# Patient Record
Sex: MALE
[De-identification: names, ages, dates, MRNs, and addresses within clinical notes are randomized per-mention and may not be internally consistent; named-entity substitution may affect disease eponyms.]

---

## 2020-05-09 ENCOUNTER — HOSPITAL ENCOUNTER (EMERGENCY)
Dept: HOSPITAL 5 - ED | Age: 65
LOS: 1 days | Discharge: HOME | End: 2020-05-10
Payer: SELF-PAY

## 2020-05-09 DIAGNOSIS — Z48.02: Primary | ICD-10-CM

## 2020-05-09 DIAGNOSIS — Z53.21: ICD-10-CM

## 2020-05-10 NOTE — EVENT NOTE
ED Screening Note


Date of service: 05/10/20


Time: 01:06


ED Screening Note: 


Patient is a 64-year-old gentleman, who presents to the ER with a complaint of 

left scalp staple retention x4 months.  He denies additional injuries and denies

additional complaints.  The staple was removed prior to my personal evaluation 

of the patient.


The patient denies additional injuries and complaints at this time.





Patient in no distress, and clinically sober


Vital signs as documented.  Tachycardia resolved on my exam





Head exam is unremarkable. 


No obvious foreign bodies noted.


No scleral icterus or corneal arcus noted.


 Neck is without jugular venous distension, thyromegaly





 Lungs are clear to auscultation and percussion.


 Cardiac exam reveals 


Rhythm is regular. First and second heart sounds normal. No murmurs, rubs or 

gallops.


No abdominal tenderness, rebound or guarding.


 Extremities are nonedematous 


2+ pulses noted in the bilateral upper extremities.





Alert and oriented x3, and clinically sober.





Moves 4 extremities, GCS of 15.





Does not appear to have an emergent medical condition at this time.  He can 

follow-up expectantly with outpatient primary care doctor.














                                   Vital Signs











  05/10/20





  00:02


 


Temperature 98.1 F


 


Pulse Rate 116 H


 


Respiratory 18





Rate 


 


Blood Pressure 139/86


 


O2 Sat by Pulse 96





Oximetry 








Tachycardia is resolved on my exam

## 2021-02-15 ENCOUNTER — HOSPITAL ENCOUNTER (INPATIENT)
Dept: HOSPITAL 5 - ED | Age: 66
LOS: 22 days | Discharge: HOME HEALTH | DRG: 871 | End: 2021-03-09
Attending: INTERNAL MEDICINE | Admitting: INTERNAL MEDICINE
Payer: COMMERCIAL

## 2021-02-15 DIAGNOSIS — I82.403: ICD-10-CM

## 2021-02-15 DIAGNOSIS — J12.82: ICD-10-CM

## 2021-02-15 DIAGNOSIS — Z79.899: ICD-10-CM

## 2021-02-15 DIAGNOSIS — A41.89: Primary | ICD-10-CM

## 2021-02-15 DIAGNOSIS — U07.1: ICD-10-CM

## 2021-02-15 DIAGNOSIS — F17.210: ICD-10-CM

## 2021-02-15 DIAGNOSIS — J96.01: ICD-10-CM

## 2021-02-15 LAB
APTT BLD: 26.5 SEC. (ref 24.2–36.6)
BASOPHILS # (AUTO): 0.1 K/MM3 (ref 0–0.1)
BASOPHILS NFR BLD AUTO: 1.1 % (ref 0–1.8)
EOSINOPHIL # BLD AUTO: 0 K/MM3 (ref 0–0.4)
EOSINOPHIL NFR BLD AUTO: 0.1 % (ref 0–4.3)
HCT VFR BLD CALC: 43.8 % (ref 35.5–45.6)
HGB BLD-MCNC: 14.7 GM/DL (ref 11.8–15.2)
INR PPP: 1.13 (ref 0.87–1.13)
LYMPHOCYTES # BLD AUTO: 0.3 K/MM3 (ref 1.2–5.4)
LYMPHOCYTES NFR BLD AUTO: 4.1 % (ref 13.4–35)
MCHC RBC AUTO-ENTMCNC: 34 % (ref 32–34)
MCV RBC AUTO: 94 FL (ref 84–94)
MONOCYTES # (AUTO): 0.6 K/MM3 (ref 0–0.8)
MONOCYTES % (AUTO): 7.8 % (ref 0–7.3)
PLATELET # BLD: 216 K/MM3 (ref 140–440)
RBC # BLD AUTO: 4.64 M/MM3 (ref 3.65–5.03)

## 2021-02-15 PROCEDURE — 87040 BLOOD CULTURE FOR BACTERIA: CPT

## 2021-02-15 PROCEDURE — 84145 PROCALCITONIN (PCT): CPT

## 2021-02-15 PROCEDURE — 87641 MR-STAPH DNA AMP PROBE: CPT

## 2021-02-15 PROCEDURE — 93970 EXTREMITY STUDY: CPT

## 2021-02-15 PROCEDURE — 96375 TX/PRO/DX INJ NEW DRUG ADDON: CPT

## 2021-02-15 PROCEDURE — 83615 LACTATE (LD) (LDH) ENZYME: CPT

## 2021-02-15 PROCEDURE — 94760 N-INVAS EAR/PLS OXIMETRY 1: CPT

## 2021-02-15 PROCEDURE — 85730 THROMBOPLASTIN TIME PARTIAL: CPT

## 2021-02-15 PROCEDURE — 84443 ASSAY THYROID STIM HORMONE: CPT

## 2021-02-15 PROCEDURE — 96365 THER/PROPH/DIAG IV INF INIT: CPT

## 2021-02-15 PROCEDURE — 85025 COMPLETE CBC W/AUTO DIFF WBC: CPT

## 2021-02-15 PROCEDURE — 82728 ASSAY OF FERRITIN: CPT

## 2021-02-15 PROCEDURE — 96372 THER/PROPH/DIAG INJ SC/IM: CPT

## 2021-02-15 PROCEDURE — 84484 ASSAY OF TROPONIN QUANT: CPT

## 2021-02-15 PROCEDURE — U0003 INFECTIOUS AGENT DETECTION BY NUCLEIC ACID (DNA OR RNA); SEVERE ACUTE RESPIRATORY SYNDROME CORONAVIRUS 2 (SARS-COV-2) (CORONAVIRUS DISEASE [COVID-19]), AMPLIFIED PROBE TECHNIQUE, MAKING USE OF HIGH THROUGHPUT TECHNOLOGIES AS DESCRIBED BY CMS-2020-01-R: HCPCS

## 2021-02-15 PROCEDURE — 82140 ASSAY OF AMMONIA: CPT

## 2021-02-15 PROCEDURE — 84439 ASSAY OF FREE THYROXINE: CPT

## 2021-02-15 PROCEDURE — 85610 PROTHROMBIN TIME: CPT

## 2021-02-15 PROCEDURE — 85379 FIBRIN DEGRADATION QUANT: CPT

## 2021-02-15 PROCEDURE — 80053 COMPREHEN METABOLIC PANEL: CPT

## 2021-02-15 PROCEDURE — 71045 X-RAY EXAM CHEST 1 VIEW: CPT

## 2021-02-15 PROCEDURE — 36415 COLL VENOUS BLD VENIPUNCTURE: CPT

## 2021-02-15 PROCEDURE — 93005 ELECTROCARDIOGRAM TRACING: CPT

## 2021-02-15 PROCEDURE — 80048 BASIC METABOLIC PNL TOTAL CA: CPT

## 2021-02-15 PROCEDURE — 86140 C-REACTIVE PROTEIN: CPT

## 2021-02-15 NOTE — XRAY REPORT
CHEST 1 VIEW 2/15/2021 10:00 PM



INDICATION / CLINICAL INFORMATION:

SOB, hypoxia.



COMPARISON: 

None available.



FINDINGS:



SUPPORT DEVICES: None.



HEART / MEDIASTINUM: No significant abnormality. 



LUNGS / PLEURA: Moderate bilateral airspace disease. Elevated left hemidiaphragm. No pneumothorax. 



ADDITIONAL FINDINGS: Old healed fracture deformity left scapula body.



IMPRESSION:

1. Bilateral pneumonia



Signer Name: Preet Pulido MD 

Signed: 2/15/2021 11:14 PM

Workstation Name: [id number]

## 2021-02-15 NOTE — EMERGENCY DEPARTMENT REPORT
ED Shortness of Breath HPI





- General


Stated Complaint: DIFF BREATHING POSS COVID


Time Seen by Provider: 02/15/21 22:27





- History of Present Illness


Initial Comments: 





65-year-old male, no past medical history, presents to ED with 2-day history of 

shortness of breath, decreased appetite, and fatigue.  Patient reports mild 

cough and loss of smell and taste.  He denies any vomiting or diarrhea.  Patient

is currently an inmate with [facility].  Patient brought to ED via EMS.  

Patient hypoxic in route.


MD Complaint: shortness of breath


-: days(s) (2)


Severity: moderate


Consistency: constant


Improves With: nothing


Worsens With: exertion


Context: recent URI


Treatments Prior to Arrival: oxygen





- Related Data


Home Oxygen Therapy: No


                                Home Medications











 Medication  Instructions  Recorded  Confirmed  Last Taken


 


No Known Home Medications [No  02/16/21 02/16/21 Unknown





Reported Home Medications]    











                                    Allergies











Allergy/AdvReac Type Severity Reaction Status Date / Time


 


No Known Allergies Allergy   Verified 02/16/21 01:03














ED Review of Systems


ROS: 


Stated complaint: DIFF BREATHING POSS COVID


Other details as noted in HPI





Comment: All other systems reviewed and negative


Constitutional: malaise.  denies: chills, fever


Respiratory: cough, shortness of breath


Gastrointestinal: denies: vomiting, diarrhea





ED Past Medical Hx





- Social History


Smoking Status: Current Every Day Smoker





- Medications


Home Medications: 


                                Home Medications











 Medication  Instructions  Recorded  Confirmed  Last Taken  Type


 


No Known Home Medications [No  02/16/21 02/16/21 Unknown History





Reported Home Medications]     














ED Physical Exam





- General


General appearance: alert, in no apparent distress





- Head


Head exam: Present: atraumatic, normocephalic





- Eye


Eye exam: Present: normal appearance, EOMI





- ENT


ENT exam: Present: mucous membranes moist





- Neck


Neck exam: Present: normal inspection





- Respiratory


Respiratory exam: Present: normal lung sounds bilaterally.  Absent: respiratory 

distress





- Cardiovascular


Cardiovascular Exam: Present: regular rate, normal rhythm





- GI/Abdominal


GI/Abdominal exam: Present: soft.  Absent: distended, tenderness





- Extremities Exam


Extremities exam: Present: normal inspection





- Neurological Exam


Neurological exam: Present: alert, oriented X3





- Psychiatric


Psychiatric exam: Present: normal affect, normal mood





- Skin


Skin exam: Present: warm, dry, intact, normal color





ED Course


                                   Vital Signs











  02/15/21 02/15/21 02/15/21





  22:26 22:28 22:31


 


Temperature  97.7 F 


 


Pulse Rate 95 H 97 H 96 H


 


Respiratory 27 H 30 H 26 H





Rate   


 


Blood Pressure   


 


Blood Pressure  109/66 





[Left]   


 


O2 Sat by Pulse 87 84 89





Oximetry   














  02/15/21 02/15/21 02/15/21





  22:45 23:01 23:15


 


Temperature   


 


Pulse Rate 88 104 H 93 H


 


Respiratory 28 H 29 H 24





Rate   


 


Blood Pressure 118/69 112/67 120/67


 


Blood Pressure   





[Left]   


 


O2 Sat by Pulse 96 78 L 97





Oximetry   














  02/15/21 02/15/21 02/16/21





  23:31 23:37 00:00


 


Temperature   


 


Pulse Rate 86 85 87


 


Respiratory 30 H 22 22





Rate   


 


Blood Pressure 110/70 110/70 103/66


 


Blood Pressure   





[Left]   


 


O2 Sat by Pulse 95 84 87





Oximetry   














  02/16/21 02/16/21 02/16/21





  00:08 00:31 00:51


 


Temperature   


 


Pulse Rate  91 H 88


 


Respiratory  23 37 H





Rate   


 


Blood Pressure  99/61 104/66


 


Blood Pressure   





[Left]   


 


O2 Sat by Pulse 97 95 89





Oximetry   














  02/16/21 02/16/21 02/16/21





  01:01 01:11 01:21


 


Temperature   


 


Pulse Rate 88 84 82


 


Respiratory 28 H 19 35 H





Rate   


 


Blood Pressure 103/61 103/61 103/61


 


Blood Pressure   





[Left]   


 


O2 Sat by Pulse 94 94 95





Oximetry   














  02/16/21





  01:30


 


Temperature 


 


Pulse Rate 91 H


 


Respiratory 25 H





Rate 


 


Blood Pressure 99/61


 


Blood Pressure 





[Left] 


 


O2 Sat by Pulse 94





Oximetry 














ED Medical Decision Making





- Lab Data


Result diagrams: 


                                 02/15/21 23:28





                                 02/15/21 23:28





- EKG Data


-: EKG Interpreted by Me


EKG shows normal: sinus rhythm, axis, intervals, QRS complexes, ST-T waves


Rate: normal





- EKG Data


Interpretation: no acute changes





- Radiology Data


Radiology results: report reviewed, image reviewed





- Medical Decision Making





65-year-old male presents to ED with shortness of breath.  Patient hypoxic with 

O2 sats in the 70s on room air.  Patient placed on high flow nasal cannula.  

Chest x-ray shows bilateral pneumonia.  Blood cultures drawn.  Rocephin, 

azithromycin, Decadron given.  Patient will be admitted to hospitalist, Dr. Carlson, for further management.





- Differential Diagnosis


COVID-19, pneumonia


Critical care attestation.: 


If time is entered above; I have spent that time in minutes in the direct care 

of this critically ill patient, excluding procedure time.








ED Disposition


Clinical Impression: 


 Suspected 2019 novel coronavirus infection, Acute respiratory failure with 

hypoxia, Pneumonia, Sepsis





Disposition: DC-09 OP ADMIT IP TO THIS HOSP


Is pt being admited?: Yes


Condition: Critical


Time of Disposition: 00:22

## 2021-02-16 LAB
ALBUMIN SERPL-MCNC: 3 G/DL (ref 3.9–5)
ALT SERPL-CCNC: 12 UNITS/L (ref 7–56)
BUN SERPL-MCNC: 19 MG/DL (ref 9–20)
BUN/CREAT SERPL: 27 %
CALCIUM SERPL-MCNC: 8.6 MG/DL (ref 8.4–10.2)
CRP SERPL-MCNC: 17.3 MG/DL (ref 0–1.3)
HEMOLYSIS INDEX: 1

## 2021-02-16 RX ADMIN — Medication SCH ML: at 21:42

## 2021-02-16 RX ADMIN — Medication SCH ML: at 09:19

## 2021-02-16 RX ADMIN — AZITHROMYCIN SCH MG: 250 TABLET, FILM COATED ORAL at 21:41

## 2021-02-16 RX ADMIN — CEFTRIAXONE SODIUM SCH MLS/HR: 2 INJECTION, POWDER, FOR SOLUTION INTRAMUSCULAR; INTRAVENOUS at 21:42

## 2021-02-16 RX ADMIN — ENOXAPARIN SODIUM SCH MG: 100 INJECTION SUBCUTANEOUS at 12:14

## 2021-02-16 RX ADMIN — ENOXAPARIN SODIUM SCH MG: 100 INJECTION SUBCUTANEOUS at 21:41

## 2021-02-16 NOTE — CONSULTATION
History of Present Illness


Consult date: 02/16/21


Requesting physician: SHANTANU EWING


Reason for consult: hypoxemia


History of present illness: 





66 y/o male prisoner presents with shortness of breath and hypoxemia.  CUrrently

on HFNC with concern for COVID.





Past History


Past Medical History: No medical history


Past Surgical History: Other (Multiple Fractures)


Social history: smoking (Current daily smoker)


Family history: no significant family history





Medications and Allergies


                                    Allergies











Allergy/AdvReac Type Severity Reaction Status Date / Time


 


No Known Allergies Allergy   Verified 02/16/21 01:03











                                Home Medications











 Medication  Instructions  Recorded  Confirmed  Last Taken  Type


 


No Known Home Medications [No  02/16/21 02/16/21 Unknown History





Reported Home Medications]     











Active Meds: 


Active Medications





Acetaminophen (Acetaminophen 325 Mg Tab)  650 mg PO Q4H PRN


   PRN Reason: Pain MILD(1-3)/Fever >100.5/HA


Azithromycin (Azithromycin 250 Mg Tab)  500 mg PO QHS YRN


   Stop: 02/19/21 22:01


Dexamethasone (Dexamethasone 4 Mg/Ml Vial)  6 mg IV Q24HR YRN


   Stop: 02/25/21 10:01


   Last Admin: 02/16/21 09:19 Dose:  6 mg


   Documented by: 


Enoxaparin Sodium (Enoxaparin 80 Mg/0.8 Ml Inj)  70 mg SUB-Q Q12HR YRN


Ceftriaxone Sodium (Rocephin/Ns 2 Gm/100 Ml)  2 gm in 100 mls @ 200 mls/hr IV 

Q24H YRN; Protocol


Magnesium Hydroxide (Magnesium Hydroxide (Mom) Oral Liqd Udc)  30 ml PO Q4H PRN


   PRN Reason: Constipation


Morphine Sulfate (Morphine 2 Mg/1 Ml Inj)  2 mg IV Q4H PRN


   PRN Reason: Pain, Moderate (4-6)


Ondansetron HCl (Ondansetron 4 Mg/2 Ml Inj)  4 mg IV Q8H PRN


   PRN Reason: Nausea And Vomiting


Sodium Chloride (Sodium Chloride 0.9% 10 Ml Flush Syringe)  10 ml IV BID YRN


   Last Admin: 02/16/21 09:19 Dose:  10 ml


   Documented by: 


Sodium Chloride (Sodium Chloride 0.9% 10 Ml Flush Syringe)  10 ml IV PRN PRN


   PRN Reason: LINE FLUSH











Physical Examination


Vital signs: 


                                   Vital Signs











Pulse Resp Pulse Ox


 


 95 H  27 H  87 


 


 02/15/21 22:26  02/15/21 22:26  02/15/21 22:26








Deferred to conserve PPE





Results





- Laboratory Findings


CBC and BMP: 


                                 02/17/21 04:14





                                 02/17/21 04:14


PT/INR, D-dimer











PT  14.3 Sec. (12.2-14.9)   02/15/21  23:28    


 


INR  1.13  (0.87-1.13)   02/15/21  23:28    


 


D-Dimer  6514.44 ng/mlDDU (0-234)  H  02/15/21  23:28    








Abnormal lab findings: 


                                  Abnormal Labs











  02/15/21 02/15/21 02/15/21





  23:28 23:28 23:28


 


Lymph % (Auto)  4.1 L  


 


Mono % (Auto)  7.8 H  


 


Lymph # (Auto)  0.3 L  


 


Seg Neutrophils %  86.9 H  


 


D-Dimer   6514.44 H 


 


Sodium    135 L


 


Chloride    95.2 L


 


Creatinine    0.7 L


 


Ferritin   


 


Lactate Dehydrogenase   


 


C-Reactive Protein   


 


Albumin    3.0 L














  02/15/21 02/15/21





  23:28 23:28


 


Lymph % (Auto)  


 


Mono % (Auto)  


 


Lymph # (Auto)  


 


Seg Neutrophils %  


 


D-Dimer  


 


Sodium  


 


Chloride  


 


Creatinine  


 


Ferritin  737.7 H 


 


Lactate Dehydrogenase   636 H


 


C-Reactive Protein   17.30 H


 


Albumin  














- Diagnostic Findings


Chest x-ray: image reviewed





Assessment and Plan





Agree with COVID precautions


Follow COVID testing


Agree with steroids


Prone as tolerated during the day and sleep prone at night.


Guarded prognosis.

## 2021-02-16 NOTE — EVENT NOTE
Date: 02/16/21


Patient seen and examined. Continue current management. Will check CTA CHEST, 

DOPPLER LOWER EXT. Start on Full ANTICOAGULATION

## 2021-02-16 NOTE — VASCULAR LAB REPORT
DUPLEX DOPPLER LOWER EXTREMITY VEINS, BILATERAL



INDICATION / CLINICAL INFORMATION:

shortness of breath.



TECHNIQUE:

Duplex doppler imaging was performed through the veins of both lower extremities using venous mar
dorothy and other maneuvers.



COMPARISON: 

None available.



FINDINGS:

RIGHT COMMON FEMORAL VEIN: Negative.

RIGHT FEMORAL VEIN: Acute thrombus distally.

RIGHT POPLITEAL VEIN: Acute thrombus.

RIGHT CALF VEINS: Acute thrombus.



LEFT COMMON FEMORAL VEIN: Negative.

LEFT FEMORAL VEIN: Acute thrombus distally.

LEFT POPLITEAL VEIN: Acute thrombus.

LEFT CALF VEINS: Acute thrombus.



ADDITIONAL FINDINGS: None.



IMPRESSION:

1. Bilateral acute DVTs as above.



The sonographer informed the patient's nurse, Marylu, of these findings at the conclusion of the exam
.



Signer Name: Prashant Ceja MD 

Signed: 2/16/2021 6:17 PM

Workstation Name: [id number]

## 2021-02-16 NOTE — CONSULTATION
History of Present Illness





- Reason for Consult


Consult date: 02/16/21





- History of Present Illness





52-year-old male no past medical history presented from [facility] with

complaints of cough, shortness of breath.  There are no other symptoms.  He is 

found to be hypoxic on presentation on room air at 70%.  He was admitted as a 

Covid PUI.





Afebrile with a white count of 7.8.  Covid testing pending.  Normal renal 

function with pending procalcitonin.  No growth on blood culture so far.  He is 

currently on ceftriaxone and azithromycin.  He is currently on high flow nasal 

cannula.





Imaging personally reviewed:


Chest x-ray: Bilateral pneumonia.





Review of systems:


Deferred due to PPE conservation status





Past History


Past Medical History: No medical history


Past Surgical History: Other (Multiple Fractures)


Social history: smoking (Current daily smoker)


Family history: no significant family history





Medications and Allergies


                                    Allergies











Allergy/AdvReac Type Severity Reaction Status Date / Time


 


No Known Allergies Allergy   Verified 02/16/21 01:03











                                Home Medications











 Medication  Instructions  Recorded  Confirmed  Last Taken  Type


 


No Known Home Medications [No  02/16/21 02/16/21 Unknown History





Reported Home Medications]     











Active Meds: 


Active Medications





Acetaminophen (Acetaminophen 325 Mg Tab)  650 mg PO Q4H PRN


   PRN Reason: Pain MILD(1-3)/Fever >100.5/HA


Azithromycin (Azithromycin 250 Mg Tab)  500 mg PO QHS YRN


   Stop: 02/19/21 22:01


Dexamethasone (Dexamethasone 4 Mg/Ml Vial)  6 mg IV Q24HR YRN


   Stop: 02/25/21 10:01


   Last Admin: 02/16/21 09:19 Dose:  6 mg


   Documented by: 


Enoxaparin Sodium (Enoxaparin 80 Mg/0.8 Ml Inj)  70 mg SUB-Q Q12HR YRN


Ceftriaxone Sodium (Rocephin/Ns 2 Gm/100 Ml)  2 gm in 100 mls @ 200 mls/hr IV 

Q24H YRN; Protocol


Magnesium Hydroxide (Magnesium Hydroxide (Mom) Oral Liqd Udc)  30 ml PO Q4H PRN


   PRN Reason: Constipation


Morphine Sulfate (Morphine 2 Mg/1 Ml Inj)  2 mg IV Q4H PRN


   PRN Reason: Pain, Moderate (4-6)


Ondansetron HCl (Ondansetron 4 Mg/2 Ml Inj)  4 mg IV Q8H PRN


   PRN Reason: Nausea And Vomiting


Sodium Chloride (Sodium Chloride 0.9% 10 Ml Flush Syringe)  10 ml IV BID YRN


   Last Admin: 02/16/21 09:19 Dose:  10 ml


   Documented by: 


Sodium Chloride (Sodium Chloride 0.9% 10 Ml Flush Syringe)  10 ml IV PRN PRN


   PRN Reason: LINE FLUSH











Physical Examination





- Physical Exam


Narrative exam: 





Physical exam deferred due to PPE conservation strategy.  Please refer to 

primary team's note.





- Constitutional


Vitals: 


                                   Vital Signs











Temp Pulse Resp BP Pulse Ox


 


 97.8 F   86   22   103/64   92 


 


 02/16/21 01:56  02/16/21 02:45  02/16/21 02:45  02/16/21 01:56  02/16/21 10:19








                           Temperature -Last 24 Hours











Temperature                    97.8 F


 


Temperature                    97.7 F


 


Temperature                    97.7 F

















Results





- Labs


CBC & Chem 7: 


                                 02/15/21 23:28





                                 02/15/21 23:28


Labs: 


                              Abnormal lab results











  02/15/21 02/15/21 02/15/21 Range/Units





  23:28 23:28 23:28 


 


Lymph % (Auto)  4.1 L    (13.4-35.0)  %


 


Mono % (Auto)  7.8 H    (0.0-7.3)  %


 


Lymph # (Auto)  0.3 L    (1.2-5.4)  K/mm3


 


Seg Neutrophils %  86.9 H    (40.0-70.0)  %


 


D-Dimer   6514.44 H   (0-234)  ng/mlDDU


 


Sodium    135 L  (137-145)  mmol/L


 


Chloride    95.2 L  ()  mmol/L


 


Creatinine    0.7 L  (0.8-1.3)  mg/dL


 


Ferritin     (30.0-300.0)  ng/mL


 


Lactate Dehydrogenase     ()  units/L


 


C-Reactive Protein     (0.00-1.30)  mg/dL


 


Albumin    3.0 L  (3.9-5)  g/dL














  02/15/21 02/15/21 Range/Units





  23:28 23:28 


 


Lymph % (Auto)    (13.4-35.0)  %


 


Mono % (Auto)    (0.0-7.3)  %


 


Lymph # (Auto)    (1.2-5.4)  K/mm3


 


Seg Neutrophils %    (40.0-70.0)  %


 


D-Dimer    (0-234)  ng/mlDDU


 


Sodium    (137-145)  mmol/L


 


Chloride    ()  mmol/L


 


Creatinine    (0.8-1.3)  mg/dL


 


Ferritin  737.7 H   (30.0-300.0)  ng/mL


 


Lactate Dehydrogenase   636 H  ()  units/L


 


C-Reactive Protein   17.30 H  (0.00-1.30)  mg/dL


 


Albumin    (3.9-5)  g/dL














Assessment and Plan





Cultures:


Blood culture no growth so far





A/P: 65 man no past medical history admitted as Covid PUI





#Bilateral pneumonia: Follow-up PCR Covid testing.  Follow-up procalcitonin





#Acute hypoxic respiratory failure: Saturations down to the 80s on admission.








Recs:


-Continue dexamethasone 6 mg every 24 hours to complete 10 days.


-Follow-up Covid testing


-Follow-up procalcitonin.  If less than 0.25 please stop ceftriaxone and 

azithromycin.


-If Covid testing positive and requiring supplemental oxygen recommend starting 

remdesivir.


-Anticoagulation per hospital protocol


-Proning as able





Thank you for the consult, we will continue to follow.





MD [facility] ([facility]


O: [phone]


F: [phone]

## 2021-02-16 NOTE — HISTORY AND PHYSICAL REPORT
History of Present Illness


Date of examination: 02/16/21


Date of admission: 


02/16/21 00:40





Chief complaint: 





Shortness of Breath


Cough


History of present illness: 





52 year old white male with no significant past medical problems currently 

incarcerated at [facility] seen in the emergency room with complaints 

of Cough, shortness of breath.


He denies any fever, no chills, no chest pain, no nausea ,no vomiting, no 

diarrhea.





Upon arrival in the emergency room , he was hypoxic with oxygen saturation of 

70%.





Work up in the ER reveals bilateral infiltrate, elevated D-dimer.





He has been started on empiric antibiotics and IV steroid.


He is also to be ruled out for COVID-19.





Past History


Past Medical History: No medical history


Past Surgical History: Other (Multiple Fractures)


Social history: smoking (Current daily smoker)


Family history: no significant family history





Medications and Allergies


                                    Allergies











Allergy/AdvReac Type Severity Reaction Status Date / Time


 


No Known Allergies Allergy   Verified 02/16/21 01:03











Active Meds: 


Active Medications





Acetaminophen (Acetaminophen 325 Mg Tab)  650 mg PO Q4H PRN


   PRN Reason: Pain MILD(1-3)/Fever >100.5/HA


Ceftriaxone Sodium (Rocephin/Ns 2 Gm/100 Ml)  2 gm in 100 mls @ 200 mls/hr IV 

Q24H YRN; Protocol


Azithromycin (Zithromax/Ns)  500 mg in 250 mls @ 250 mls/hr IV Q24H YRN; 

Protocol


Magnesium Hydroxide (Magnesium Hydroxide (Mom) Oral Liqd Udc)  30 ml PO Q4H PRN


   PRN Reason: Constipation


Morphine Sulfate (Morphine 2 Mg/1 Ml Inj)  2 mg IV Q4H PRN


   PRN Reason: Pain, Moderate (4-6)


Ondansetron HCl (Ondansetron 4 Mg/2 Ml Inj)  4 mg IV Q8H PRN


   PRN Reason: Nausea And Vomiting


Sodium Chloride (Sodium Chloride 0.9% 10 Ml Flush Syringe)  10 ml IV BID YRN


Sodium Chloride (Sodium Chloride 0.9% 10 Ml Flush Syringe)  10 ml IV PRN PRN


   PRN Reason: LINE FLUSH











Review of Systems


Constitutional: no fever, no chills


Ears, nose, mouth and throat: no nasal congestion, no sore throat


Cardiovascular: no chest pain, no palpitations


Respiratory: cough, shortness of breath, congestion


Gastrointestinal: no abdominal pain, no nausea, no vomiting, no diarrhea


Genitourinary Male: no dysuria, no hematuria


Musculoskeletal: no neck pain, no low back pain


Integumentary: no rash, no pruritis


Neurological: no headaches, no confusion


Psychiatric: no anxiety, no insomnia, no depression





Exam





- Constitutional


Vitals: 


                                        











Temp Pulse Resp BP Pulse Ox


 


 97.7 F   91 H  23   99/61   95 


 


 02/15/21 22:28  02/16/21 00:31  02/16/21 00:31  02/16/21 00:31  02/16/21 00:31











General appearance: Present: no acute distress, well-nourished





- EENT


Eyes: Present: PERRL, EOM intact.  Absent: scleral icterus


ENT: hearing intact, clear oral mucosa, dentition normal





- Neck


Neck: Present: supple, normal ROM





- Respiratory


Respiratory effort: normal


Respiratory: bilateral: diminished





- Cardiovascular


Rhythm: regular


Heart Sounds: Present: S1 & S2.  Absent: gallop, systolic murmur, diastolic 

murmur, rub





- Extremities


Extremities: no ischemia, pulses intact, pulses symmetrical, No edema, normal 

temperature, normal color, Full ROM


Peripheral Pulses: within normal limits





- Abdominal


General gastrointestinal: Present: soft, non-tender, non-distended, normal bowel

sounds.  Absent: mass





- Integumentary


Integumentary: Present: clear, warm, dry





- Musculoskeletal


Musculoskeletal: strength equal bilaterally





- Psychiatric


Psychiatric: appropriate mood/affect, intact judgment & insight, memory intact, 

cooperative





- Neurologic


Neurologic: CNII-XII intact, no focal deficits, moves all extremities





HEART Score





- HEART Score


Troponin: 


                                        











Troponin T  < 0.010 ng/mL (0.00-0.029)   02/15/21  23:28    














Results





- Labs


CBC & Chem 7: 


                                 02/15/21 23:28





                                 02/15/21 23:28


Labs: 


                             Laboratory Last Values











WBC  7.8 K/mm3 (4.5-11.0)   02/15/21  23:28    


 


RBC  4.64 M/mm3 (3.65-5.03)   02/15/21  23:28    


 


Hgb  14.7 gm/dl (11.8-15.2)   02/15/21  23:28    


 


Hct  43.8 % (35.5-45.6)   02/15/21  23:28    


 


MCV  94 fl (84-94)   02/15/21  23:28    


 


MCH  32 pg (28-32)   02/15/21  23:28    


 


MCHC  34 % (32-34)   02/15/21  23:28    


 


RDW  14.5 % (13.2-15.2)   02/15/21  23:28    


 


Plt Count  216 K/mm3 (140-440)   02/15/21  23:28    


 


Lymph % (Auto)  4.1 % (13.4-35.0)  L  02/15/21  23:28    


 


Mono % (Auto)  7.8 % (0.0-7.3)  H  02/15/21  23:28    


 


Eos % (Auto)  0.1 % (0.0-4.3)   02/15/21  23:28    


 


Baso % (Auto)  1.1 % (0.0-1.8)   02/15/21  23:28    


 


Lymph # (Auto)  0.3 K/mm3 (1.2-5.4)  L  02/15/21  23:28    


 


Mono # (Auto)  0.6 K/mm3 (0.0-0.8)   02/15/21  23:28    


 


Eos # (Auto)  0.0 K/mm3 (0.0-0.4)   02/15/21  23:28    


 


Baso # (Auto)  0.1 K/mm3 (0.0-0.1)   02/15/21  23:28    


 


Seg Neutrophils %  86.9 % (40.0-70.0)  H  02/15/21  23:28    


 


Seg Neutrophils #  6.7 K/mm3 (1.8-7.7)   02/15/21  23:28    


 


PT  14.3 Sec. (12.2-14.9)   02/15/21  23:28    


 


INR  1.13  (0.87-1.13)   02/15/21  23:28    


 


APTT  26.5 Sec. (24.2-36.6)   02/15/21  23:28    


 


D-Dimer  6514.44 ng/mlDDU (0-234)  H  02/15/21  23:28    


 


Sodium  135 mmol/L (137-145)  L  02/15/21  23:28    


 


Potassium  3.9 mmol/L (3.6-5.0)   02/15/21  23:28    


 


Chloride  95.2 mmol/L ()  L  02/15/21  23:28    


 


Carbon Dioxide  26 mmol/L (22-30)   02/15/21  23:28    


 


Anion Gap  18 mmol/L  02/15/21  23:28    


 


BUN  19 mg/dL (9-20)   02/15/21  23:28    


 


Creatinine  0.7 mg/dL (0.8-1.3)  L  02/15/21  23:28    


 


Estimated GFR  > 60 ml/min  02/15/21  23:28    


 


BUN/Creatinine Ratio  27 %  02/15/21  23:28    


 


Glucose  98 mg/dL ()   02/15/21  23:28    


 


Lactic Acid  1.60 mmol/L (0.7-2.0)   02/15/21  23:28    


 


Calcium  8.6 mg/dL (8.4-10.2)   02/15/21  23:28    


 


Total Bilirubin  0.60 mg/dL (0.1-1.2)   02/15/21  23:28    


 


AST  38 units/L (5-40)   02/15/21  23:28    


 


ALT  12 units/L (7-56)   02/15/21  23:28    


 


Alkaline Phosphatase  61 units/L ()   02/15/21  23:28    


 


Troponin T  < 0.010 ng/mL (0.00-0.029)   02/15/21  23:28    


 


Total Protein  6.4 g/dL (6.3-8.2)   02/15/21  23:28    


 


Albumin  3.0 g/dL (3.9-5)  L  02/15/21  23:28    


 


Albumin/Globulin Ratio  0.9 %  02/15/21  23:28    











Microbiology: 


Microbiology





02/15/21 23:28   Peripheral/Venous   Blood Culture - Preliminary


                            Culture in Progress


02/15/21 23:19   Peripheral/Venous   Blood Culture - Preliminary


                            Culture in Progress











Assessment and Plan





- Patient Problems


(1) Pneumonia


Current Visit: Yes   Status: Acute   


Plan to address problem: 


Patient placed on empiric antibiotics. Will await culture results.








(2) Acute respiratory failure with hypoxia


Current Visit: Yes   Status: Acute   


Plan to address problem: 


Probably secondary to the pneumonia  with Covid-19,


Currently on oxygen. Will maintain oxygen saturation greater than 94%


Consult placed to Pulmonologist for evaluation.








(3) Suspected 2019 novel coronavirus infection


Current Visit: Yes   Status: Acute   


Plan to address problem: 


Will await COVID-19 testing.


Consult placed to infectious disease for evalution








(4) Sepsis


Current Visit: Yes   Status: Acute   


Plan to address problem: 


Secondary to pneumonia.


Will continue on antibiotics and monitor labs.








(5) DVT prophylaxis


Current Visit: Yes   Status: Acute   


Plan to address problem: 


Patient placed on subcutaneous Lovenox.











(6) Full code status


Current Visit: Yes   Status: Acute   


Plan to address problem: 


Patient is a full code.

## 2021-02-17 LAB
BASOPHILS # (AUTO): 0 K/MM3 (ref 0–0.1)
BASOPHILS NFR BLD AUTO: 0.2 % (ref 0–1.8)
BUN SERPL-MCNC: 27 MG/DL (ref 9–20)
BUN/CREAT SERPL: 39 %
CALCIUM SERPL-MCNC: 8.3 MG/DL (ref 8.4–10.2)
EOSINOPHIL # BLD AUTO: 0 K/MM3 (ref 0–0.4)
EOSINOPHIL NFR BLD AUTO: 0 % (ref 0–4.3)
HCT VFR BLD CALC: 40.1 % (ref 35.5–45.6)
HEMOLYSIS INDEX: 7
HGB BLD-MCNC: 13.3 GM/DL (ref 11.8–15.2)
INR PPP: 1.11 (ref 0.87–1.13)
LYMPHOCYTES # BLD AUTO: 0.6 K/MM3 (ref 1.2–5.4)
LYMPHOCYTES NFR BLD AUTO: 5.4 % (ref 13.4–35)
MCHC RBC AUTO-ENTMCNC: 33 % (ref 32–34)
MCV RBC AUTO: 94 FL (ref 84–94)
MONOCYTES # (AUTO): 0.8 K/MM3 (ref 0–0.8)
MONOCYTES % (AUTO): 6.9 % (ref 0–7.3)
PLATELET # BLD: 220 K/MM3 (ref 140–440)
RBC # BLD AUTO: 4.28 M/MM3 (ref 3.65–5.03)

## 2021-02-17 RX ADMIN — CEFTRIAXONE SODIUM SCH MLS/HR: 2 INJECTION, POWDER, FOR SOLUTION INTRAMUSCULAR; INTRAVENOUS at 21:50

## 2021-02-17 RX ADMIN — Medication SCH ML: at 21:51

## 2021-02-17 RX ADMIN — SODIUM CHLORIDE SCH ML: 9 INJECTION, SOLUTION INTRAVENOUS at 11:08

## 2021-02-17 RX ADMIN — Medication SCH ML: at 16:35

## 2021-02-17 RX ADMIN — ENOXAPARIN SODIUM SCH MG: 100 INJECTION SUBCUTANEOUS at 16:32

## 2021-02-17 RX ADMIN — AZITHROMYCIN SCH MG: 250 TABLET, FILM COATED ORAL at 21:51

## 2021-02-17 RX ADMIN — ENOXAPARIN SODIUM SCH MG: 100 INJECTION SUBCUTANEOUS at 21:51

## 2021-02-17 NOTE — PROGRESS NOTE
Assessment and Plan


Assessment and plan: 


52 year old white male with no significant past medical problems currently 

incarcerated at [facility] seen in the emergency room with complaints 

of Cough, shortness of breath.


He denies any fever, no chills, no chest pain, no nausea ,no vomiting, no 

diarrhea.





Upon arrival in the emergency room , he was hypoxic with oxygen saturation of 

70%.





Work up in the ER reveals bilateral infiltrate, elevated D-dimer.





He has been started on empiric antibiotics and IV steroid.


He is also to be ruled out for COVID-19.





2/17: Patient continues on high flow oxygen.  Coronavirus testing came back 

positive.  Patient is on steroids awaiting ID for remdesivir treatment 

initiation.  He is also noted to have acute bilateral DVT and with the severity 

of his illness I anticipate that he probably has pulmonary embolism.  We will 

continue full anticoagulation at this time.  Pulmonologist input is and ID input

appreciated.  We will add some vitamin supplementation and will evaluate if 

diuresis is needed.  Okay for patient to start diet, showing the patient was 

n.p.o. yesterday.  Plan has been discussed with the patient and also the nursing

staff and the officer on duty.








(1) Pneumonia


Current Visit: Yes   Status: Acute   


Plan to address problem: 


Patient placed on empiric antibiotics. Will await culture results.








(2) Acute respiratory failure with hypoxia


Current Visit: Yes   Status: Acute   


Plan to address problem: 


Probably secondary to the pneumonia  with Covid-19,


Currently on oxygen. Will maintain oxygen saturation greater than 94%


Consult placed to Pulmonologist for evaluation.








(3) Suspected 2019 novel coronavirus infection


Current Visit: Yes   Status: Acute   


Plan to address problem: 


Will await COVID-19 testing.


Consult placed to infectious disease for evalution








(4) Sepsis


Current Visit: Yes   Status: Acute   


Plan to address problem: 


Secondary to pneumonia.


Will continue on antibiotics and monitor labs.








(5) DVT bilateral lower extremity


Current Visit: Yes   Status: Acute   


Plan to address problem: 


On full anticoagulation








(6) Full code status


Current Visit: Yes   Status: Acute   


Plan to address problem: 


Patient is a full code.





The high probability of a clinically significant, sudden or life threatening 

deterioration of the [pulmonary, vascular] system(s) required my full and direct

attention, intervention and personal management. The aggregate critical care 

time was [35] minutes. This time is in addition to time spent performing 

reported procedures but includes the following: 





[x] Data Review and interpretation 





[x] Patient assessment and monitoring of vital signs 





[x] Documentation 





[x] Medication orders and management





History


Interval history: 


Patient seen and examined still with some shortness of breath.  Remains on high 

flow.  Discussed with nursing staff and okayed patient to eat, sure why the 

patient was kept n.p.o.  Bilateral lower extremity ultrasound did reveal DVTs.








Hospitalist Physical





- Physical exam


Narrative exam: 


VITAL SIGNS:  Reviewed.    


GENERAL:  The patient appears normally developed, acutely ill-appearing, 

cachectic.  On high flow oxygen.  Vital signs as documented.


HEAD:  No signs of head trauma.


EYES:  Pupils are equal.  Extraocular motions intact.  


EARS:  Hearing grossly intact.


MOUTH:  Oropharynx is normal. 


NECK:  No adenopathy, no JVD.  


CHEST:  Chest with diminished breath sounds bilaterally.  No wheezes, rales, or 

rhonchi.  


CARDIAC:  Regular rate and rhythm.  S1 and S2, without murmurs, gallops, or 

rubs.


VASCULAR:  No Edema.  Peripheral pulses normal and equal in all extremities.


ABDOMEN:  Soft, non tender and non distended.  No   rebound or guarding, and no 

masses palpated.   Bowel Sounds normal.


MUSCULOSKELETAL:  Good range of motion of all major joints. Extremities without 

clubbing, cyanosis or edema.  


NEUROLOGIC EXAM:  Alert and oriented x 3   No focal sensory or strength 

deficits.   Speech normal.  Follows commands.


PSYCHIATRIC:  Mood normal.


SKIN:  detail exam as documented in skin assessment








- Constitutional


Vitals: 


                                        











Temp Pulse Resp BP Pulse Ox


 


 98.4 F   66   16   108/73   97 


 


 02/17/21 06:19  02/17/21 06:19  02/17/21 06:19  02/17/21 06:19  02/17/21 06:19











General appearance: Present: no acute distress, well-nourished





HEART Score





- HEART Score


Troponin: 


                                        











Troponin T  < 0.010 ng/mL (0.00-0.029)   02/15/21  23:28    














Results





- Labs


CBC & Chem 7: 


                                 02/17/21 04:14





                                 02/17/21 04:14


Labs: 


                             Laboratory Last Values











WBC  11.9 K/mm3 (4.5-11.0)  H  02/17/21  04:14    


 


RBC  4.28 M/mm3 (3.65-5.03)   02/17/21  04:14    


 


Hgb  13.3 gm/dl (11.8-15.2)   02/17/21  04:14    


 


Hct  40.1 % (35.5-45.6)   02/17/21  04:14    


 


MCV  94 fl (84-94)   02/17/21  04:14    


 


MCH  31 pg (28-32)   02/17/21  04:14    


 


MCHC  33 % (32-34)   02/17/21  04:14    


 


RDW  14.1 % (13.2-15.2)   02/17/21  04:14    


 


Plt Count  220 K/mm3 (140-440)   02/17/21  04:14    


 


Lymph % (Auto)  5.4 % (13.4-35.0)  L  02/17/21  04:14    


 


Mono % (Auto)  6.9 % (0.0-7.3)   02/17/21  04:14    


 


Eos % (Auto)  0.0 % (0.0-4.3)   02/17/21  04:14    


 


Baso % (Auto)  0.2 % (0.0-1.8)   02/17/21  04:14    


 


Lymph # (Auto)  0.6 K/mm3 (1.2-5.4)  L  02/17/21  04:14    


 


Mono # (Auto)  0.8 K/mm3 (0.0-0.8)   02/17/21  04:14    


 


Eos # (Auto)  0.0 K/mm3 (0.0-0.4)   02/17/21  04:14    


 


Baso # (Auto)  0.0 K/mm3 (0.0-0.1)   02/17/21  04:14    


 


Seg Neutrophils %  87.5 % (40.0-70.0)  H  02/17/21  04:14    


 


Seg Neutrophils #  10.4 K/mm3 (1.8-7.7)  H  02/17/21  04:14    


 


PT  14.1 Sec. (12.2-14.9)   02/17/21  04:14    


 


INR  1.11  (0.87-1.13)   02/17/21  04:14    


 


APTT  26.5 Sec. (24.2-36.6)   02/15/21  23:28    


 


D-Dimer  6514.44 ng/mlDDU (0-234)  H  02/15/21  23:28    


 


Sodium  137 mmol/L (137-145)   02/17/21  04:14    


 


Potassium  4.2 mmol/L (3.6-5.0)   02/17/21  04:14    


 


Chloride  99.1 mmol/L ()   02/17/21  04:14    


 


Carbon Dioxide  27 mmol/L (22-30)   02/17/21  04:14    


 


Anion Gap  15 mmol/L  02/17/21  04:14    


 


BUN  27 mg/dL (9-20)  H  02/17/21  04:14    


 


Creatinine  0.7 mg/dL (0.8-1.3)  L  02/17/21  04:14    


 


Estimated GFR  > 60 ml/min  02/17/21  04:14    


 


BUN/Creatinine Ratio  39 %  02/17/21  04:14    


 


Glucose  138 mg/dL ()  H  02/17/21  04:14    


 


Lactic Acid  1.40 mmol/L (0.7-2.0)   02/16/21  04:51    


 


Calcium  8.3 mg/dL (8.4-10.2)  L  02/17/21  04:14    


 


Ferritin  737.7 ng/mL (30.0-300.0)  H  02/15/21  23:28    


 


Total Bilirubin  0.60 mg/dL (0.1-1.2)   02/15/21  23:28    


 


AST  38 units/L (5-40)   02/15/21  23:28    


 


ALT  12 units/L (7-56)   02/15/21  23:28    


 


Alkaline Phosphatase  61 units/L ()   02/15/21  23:28    


 


Lactate Dehydrogenase  636 units/L ()  H  02/15/21  23:28    


 


Troponin T  < 0.010 ng/mL (0.00-0.029)   02/15/21  23:28    


 


C-Reactive Protein  17.30 mg/dL (0.00-1.30)  H  02/15/21  23:28    


 


Total Protein  6.4 g/dL (6.3-8.2)   02/15/21  23:28    


 


Albumin  3.0 g/dL (3.9-5)  L  02/15/21  23:28    


 


Albumin/Globulin Ratio  0.9 %  02/15/21  23:28    


 


Procalcitonin  0.46 ng/mL (<0.15)   02/15/21  23:28    


 


TSH  0.573 mlU/mL (0.270-4.200)   02/16/21  13:51    


 


Free T4  1.21 ng/dL (0.76-1.46)   02/16/21  13:51    


 


Nasal Screen MRSA (PCR)  Negative  (Negative)   02/16/21  Unknown


 


Coronavirus (PCR)  Positive  (Negative)  A  02/16/21  10:18    











Microbiology: 


Microbiology





02/15/21 23:28   Peripheral/Venous   Blood Culture - Preliminary


                            NO GROWTH AFTER 24 HOURS


02/15/21 23:19   Peripheral/Venous   Blood Culture - Preliminary


                            NO GROWTH AFTER 24 HOURS








Godwin/IV: 


                                        





Voiding Method                   Urinal











Active Medications





- Current Medications


Current Medications: 














Generic Name Dose Route Start Last Admin





  Trade Name Freq  PRN Reason Stop Dose Admin


 


Acetaminophen  650 mg  02/16/21 00:57 





  Acetaminophen 325 Mg Tab  PO  





  Q4H PRN  





  Pain MILD(1-3)/Fever >100.5/HA  


 


Azithromycin  500 mg  02/16/21 22:00  02/16/21 21:41





  Azithromycin 250 Mg Tab  PO  02/19/21 22:01  500 mg





  QHS YRN   Administration


 


Dexamethasone  6 mg  02/16/21 10:00  02/17/21 11:08





  Dexamethasone 4 Mg/Ml Vial  IV  02/25/21 10:01  6 mg





  Q24HR YRN   Administration


 


Enoxaparin Sodium  70 mg  02/16/21 11:30  02/16/21 21:41





  Enoxaparin 80 Mg/0.8 Ml Inj  SUB-Q   70 mg





  Q12HR YRN   Administration


 


Ceftriaxone Sodium  2 gm in 100 mls @ 200 mls/hr  02/16/21 22:00  02/16/21 21:42





  Rocephin/Ns 2 Gm/100 Ml  IV   200 mls/hr





  Q24H YRN   Administration





  Protocol  


 


REMDESIVIR 200 mg/ Sodium  250 mls @ 500 mls/hr  02/17/21 11:00  02/17/21 11:08





  Chloride  IV  02/17/21 11:29  500 mls/hr





  ONCE ONE   Administration


 


REMDESIVIR 100 mg/ Sodium  250 mls @ 500 mls/hr  02/18/21 21:00 





  Chloride  IV  02/21/21 21:29 





  Q24HR@2100 YRN  


 


Magnesium Hydroxide  30 ml  02/16/21 00:57 





  Magnesium Hydroxide (Mom) Oral Liqd Udc  PO  





  Q4H PRN  





  Constipation  


 


Morphine Sulfate  2 mg  02/16/21 00:57 





  Morphine 2 Mg/1 Ml Inj  IV  





  Q4H PRN  





  Pain, Moderate (4-6)  


 


Ondansetron HCl  4 mg  02/16/21 00:57 





  Ondansetron 4 Mg/2 Ml Inj  IV  





  Q8H PRN  





  Nausea And Vomiting  


 


Sodium Chloride  10 ml  02/16/21 10:00  02/16/21 21:42





  Sodium Chloride 0.9% 10 Ml Flush Syringe  IV   10 ml





  BID YRN   Administration


 


Sodium Chloride  10 ml  02/16/21 00:57 





  Sodium Chloride 0.9% 10 Ml Flush Syringe  IV  





  PRN PRN  





  LINE FLUSH  


 


Sodium Chloride  50 ml  02/17/21 11:30  02/17/21 11:08





  Sodium Chloride 0.9% 50 Ml Ivpb  IV  02/21/21 21:01  50 ml





  Q24HR@2100 YRN   Administration














Nutrition/Malnutrition Assess





- Dietary Evaluation


Nutrition/Malnutrition Findings: 


                                        





Nutrition Notes                                            Start:  02/16/21 

12:36


Freq:                                                      Status: Active       

 


Protocol:                                                                       

 


 Document     02/16/21 13:00  AT  (Rec: 02/16/21 13:08  AT  [id number])


 Co-Sign      02/16/21 13:00  LP


 Nutrition Notes


     Need for Assessment generated from:         RN Referral


     Initial or Follow up                        Assessment


     Current Diagnosis                           Sepsis,Respiratory Failure


     Other Pertinent Diagnosis                   COVID PUI, SOB, PNA


     Current Diet                                Regular Diet


     Labs/Tests                                  2/15


                                                 Na 135


                                                 Cr 0.7


     Pertinent Medications                       Decadron


     Height                                      5 ft 9 in


     Weight                                      68.039 kg


     Ideal Body Weight (kg)                      72.72


     BMI                                         22.1


     Intake Prior to Admission                   Poor


     Weight Status                               Appropriate


     Subjective/Other Information                Screen for Vikash Score and


                                                 MST of 2. Was unable to reach


                                                 pt by phone x2. Per chart, pt


                                                 has no PMH to evaluate. Per RN


                                                 , pt has had poor appetite PTA


                                                 and currently; pt consumed


                                                 100% of breakfast and lunch,


                                                 but took sips of juice and


                                                 water. Per chart, pt is


                                                 wheezing with labored


                                                 breathing. RN reports pt has


                                                 no wounds, but pt has 2 Vikash


                                                 scores for today, 20 and 15,


                                                 respectively. Per chart, pt


                                                 reports having no BM for


                                                 several days, writer discussed


                                                 this with RN.


     Burn                                        Absent


     Trauma                                      Absent


     GI Symptoms                                 Last BM


     Food Allergy                                No


     Current % PO                                Negligible


     Minimum of two criteria                     No


     Reduced  Strength                       Measurably Reduced (severe)


     #1


      Nutrition Diagnosis                        Predicted suboptimal energy


                                                 intake


      Etiology                                   ARF


      As Evidenced by Signs and Symptoms         pt reports decreased appetite


                                                 PTA and consumed 0% at


                                                 breakfast


     Is patient on ventilator?                   No


     Is Patient Ambulatory and/or Out of Bed     No


     REE-([facility]-confined to bed)      [id number]


     Calculation Used for Recommendations        [facility]


     Additional Notes                            PRO needs: 68-82g (1-1.2 g/kg)


                                                 Fluid needs: 1 mL/kcal or per


                                                 MD


 Nutrition Intervention


     Change Diet Order:                          Continue Regular Diet


     Add Supplement/Snack (indicate name/kcal    Ensure Enlive BID


      /protein )                                 


     Provides kCal:                              700


     Provides Protein (gm)                       40


     Goal #1                                     Maintain weight


     Goal #2                                     Meet at least 75% of estimated


                                                 energy and protein needs via


                                                 diet and ONS


     Anticipated Discharge Needs:                Regular diet


     Follow-Up By:                               02/18/21


     Additional Comments                         F/U for intakes and ONS


                                                 tolerance, BM

## 2021-02-17 NOTE — PROGRESS NOTE
Assessment and Plan





Cultures:


Blood culture no growth so far





A/P: 65 man no past medical history admitted as Covid PUI





#Covid pneumonia: Elevated procalcitonin as well.





#Acute hypoxic respiratory failure: Saturations down to the 80s on admission.  

On high flow nasal cannula








Recs:


-Continue dexamethasone 6 mg every 24 hours to complete 10 days.


-Continue ceftriaxone and azithromycin to complete 5 and 3 days respectively.


-Start remdesivir, complete 5 days.  Monitor renal and hepatic function.  Day 1 

of 5.


-Obtain inflammatory markers every 48-72 hours.


-Anticoagulation per hospital protocol


-Proning as able





Thank you for the consult, we will continue to follow.





ANDRES Horvath MD


[facility], [place], operated by [facility] ([facility]


O: [phone]


F: [phone]





 








Subjective


Date of service: 02/17/21


Interval history: 





Afebrile, white count 12.  Covid positive.  Procalcitonin mildly elevated at 

0.46.  On high flow nasal cannula.





Objective





- Exam


Narrative Exam: 





Physical exam deferred due to PPE conservation strategy.  Please refer to 

primary team's note.





- Constitutional


Vitals: 


                                   Vital Signs











Temp Pulse Resp BP Pulse Ox


 


 97.1 F L  67   24   98/63   93 


 


 02/17/21 12:37  02/17/21 12:37  02/17/21 12:37  02/17/21 12:37  02/17/21 12:37








                           Temperature -Last 24 Hours











Temperature                    97.1 F


 


Temperature                    98.4 F


 


Temperature                    97.0 F


 


Temperature                    97.4 F

















- Labs


CBC & Chem 7: 


                                 02/17/21 04:14





                                 02/17/21 04:14


Labs: 


                              Abnormal lab results











  02/17/21 02/17/21 Range/Units





  04:14 04:14 


 


WBC  11.9 H   (4.5-11.0)  K/mm3


 


Lymph % (Auto)  5.4 L   (13.4-35.0)  %


 


Lymph # (Auto)  0.6 L   (1.2-5.4)  K/mm3


 


Seg Neutrophils %  87.5 H   (40.0-70.0)  %


 


Seg Neutrophils #  10.4 H   (1.8-7.7)  K/mm3


 


BUN   27 H  (9-20)  mg/dL


 


Creatinine   0.7 L  (0.8-1.3)  mg/dL


 


Glucose   138 H  ()  mg/dL


 


Calcium   8.3 L  (8.4-10.2)  mg/dL

## 2021-02-17 NOTE — PROGRESS NOTE
Assessment and Plan





Agree with COVID precautions


Wean FiO2 for sats >88%


Agree with steroids


Prone as tolerated during the day and sleep prone at night.


Guarded prognosis.





Subjective


Date of service: 02/17/21


Interval history: 





Now up to 40 liters and 100 %. COVID positive.  Also has bilateral DVT.





Objective


                               Vital Signs - 12hr











  02/17/21 02/17/21





  02:00 06:19


 


Temperature  98.4 F


 


Pulse Rate  66


 


Respiratory  16





Rate  


 


Blood Pressure  108/73


 


O2 Sat by Pulse 95 97





Oximetry  











CBC and BMP: 


                                 02/17/21 04:14





                                 02/17/21 04:14


ABG, PT/INR, D-dimer: 


PT/INR, D-dimer











PT  14.1 Sec. (12.2-14.9)   02/17/21  04:14    


 


INR  1.11  (0.87-1.13)   02/17/21  04:14    


 


D-Dimer  6514.44 ng/mlDDU (0-234)  H  02/15/21  23:28    








Abnormal lab findings: 


                                  Abnormal Labs











  02/15/21 02/15/21 02/15/21





  23:28 23:28 23:28


 


WBC   


 


Lymph % (Auto)  4.1 L  


 


Mono % (Auto)  7.8 H  


 


Lymph # (Auto)  0.3 L  


 


Seg Neutrophils %  86.9 H  


 


Seg Neutrophils #   


 


D-Dimer   6514.44 H 


 


Sodium    135 L


 


Chloride    95.2 L


 


BUN   


 


Creatinine    0.7 L


 


Glucose   


 


Calcium   


 


Ferritin   


 


Lactate Dehydrogenase   


 


C-Reactive Protein   


 


Albumin    3.0 L


 


Coronavirus (PCR)   














  02/15/21 02/15/21 02/16/21





  23:28 23:28 10:18


 


WBC   


 


Lymph % (Auto)   


 


Mono % (Auto)   


 


Lymph # (Auto)   


 


Seg Neutrophils %   


 


Seg Neutrophils #   


 


D-Dimer   


 


Sodium   


 


Chloride   


 


BUN   


 


Creatinine   


 


Glucose   


 


Calcium   


 


Ferritin  737.7 H  


 


Lactate Dehydrogenase   636 H 


 


C-Reactive Protein   17.30 H 


 


Albumin   


 


Coronavirus (PCR)    Positive A














  02/17/21 02/17/21





  04:14 04:14


 


WBC  11.9 H 


 


Lymph % (Auto)  5.4 L 


 


Mono % (Auto)  


 


Lymph # (Auto)  0.6 L 


 


Seg Neutrophils %  87.5 H 


 


Seg Neutrophils #  10.4 H 


 


D-Dimer  


 


Sodium  


 


Chloride  


 


BUN   27 H


 


Creatinine   0.7 L


 


Glucose   138 H


 


Calcium   8.3 L


 


Ferritin  


 


Lactate Dehydrogenase  


 


C-Reactive Protein  


 


Albumin  


 


Coronavirus (PCR)

## 2021-02-18 LAB
ALBUMIN SERPL-MCNC: 2.9 G/DL (ref 3.9–5)
ALT SERPL-CCNC: 21 UNITS/L (ref 7–56)
BUN SERPL-MCNC: 25 MG/DL (ref 9–20)
BUN/CREAT SERPL: 42 %
CALCIUM SERPL-MCNC: 7.8 MG/DL (ref 8.4–10.2)
HEMOLYSIS INDEX: 1

## 2021-02-18 PROCEDURE — XW033E5 INTRODUCTION OF REMDESIVIR ANTI-INFECTIVE INTO PERIPHERAL VEIN, PERCUTANEOUS APPROACH, NEW TECHNOLOGY GROUP 5: ICD-10-PCS | Performed by: INTERNAL MEDICINE

## 2021-02-18 RX ADMIN — ENOXAPARIN SODIUM SCH MG: 100 INJECTION SUBCUTANEOUS at 22:24

## 2021-02-18 RX ADMIN — OXYCODONE HYDROCHLORIDE AND ACETAMINOPHEN SCH MG: 500 TABLET ORAL at 22:23

## 2021-02-18 RX ADMIN — CHOLECALCIFEROL TAB 125 MCG (5000 UNIT) SCH UNIT: 125 TAB at 10:23

## 2021-02-18 RX ADMIN — ENOXAPARIN SODIUM SCH MG: 100 INJECTION SUBCUTANEOUS at 10:23

## 2021-02-18 RX ADMIN — Medication SCH MG: at 10:23

## 2021-02-18 RX ADMIN — SODIUM CHLORIDE SCH ML: 9 INJECTION, SOLUTION INTRAVENOUS at 23:31

## 2021-02-18 RX ADMIN — OXYCODONE HYDROCHLORIDE AND ACETAMINOPHEN SCH MG: 500 TABLET ORAL at 10:23

## 2021-02-18 RX ADMIN — AZITHROMYCIN SCH MG: 250 TABLET, FILM COATED ORAL at 22:24

## 2021-02-18 RX ADMIN — Medication SCH ML: at 10:23

## 2021-02-18 RX ADMIN — Medication SCH ML: at 22:24

## 2021-02-18 RX ADMIN — REMDESIVIR SCH MLS/HR: 100 INJECTION, POWDER, LYOPHILIZED, FOR SOLUTION INTRAVENOUS at 22:23

## 2021-02-18 NOTE — PROGRESS NOTE
Assessment and Plan





Agree with COVID precautions


Wean FiO2 for sats >88%


Agree with steroids


Prone as tolerated during the day and sleep prone at night.


Guarded prognosis.





Subjective


Date of service: 02/18/21


Interval history: 





No acute events.  Still on HFNC.





Objective


                               Vital Signs - 12hr











  02/18/21 02/18/21 02/18/21





  02:00 04:39 08:00


 


Temperature  97.4 F L 


 


Pulse Rate  71 


 


Respiratory  20 





Rate   


 


Blood Pressure  103/67 


 


Blood Pressure   





[Left]   


 


O2 Sat by Pulse 97 94 92





Oximetry   














  02/18/21





  10:00


 


Temperature 


 


Pulse Rate 76


 


Respiratory 





Rate 


 


Blood Pressure 


 


Blood Pressure 97/66





[Left] 


 


O2 Sat by Pulse 





Oximetry 











CBC and BMP: 


                                 02/17/21 04:14





                                 02/18/21 05:19


ABG, PT/INR, D-dimer: 


PT/INR, D-dimer











PT  14.1 Sec. (12.2-14.9)   02/17/21  04:14    


 


INR  1.11  (0.87-1.13)   02/17/21  04:14    


 


D-Dimer  6514.44 ng/mlDDU (0-234)  H  02/15/21  23:28    








Abnormal lab findings: 


                                  Abnormal Labs











  02/15/21 02/15/21 02/15/21





  23:28 23:28 23:28


 


WBC   


 


Lymph % (Auto)  4.1 L  


 


Mono % (Auto)  7.8 H  


 


Lymph # (Auto)  0.3 L  


 


Seg Neutrophils %  86.9 H  


 


Seg Neutrophils #   


 


D-Dimer   6514.44 H 


 


Sodium    135 L


 


Chloride    95.2 L


 


BUN   


 


Creatinine    0.7 L


 


Glucose   


 


Calcium   


 


Ferritin   


 


Lactate Dehydrogenase   


 


C-Reactive Protein   


 


Total Protein   


 


Albumin    3.0 L


 


Coronavirus (PCR)   














  02/15/21 02/15/21 02/16/21





  23:28 23:28 10:18


 


WBC   


 


Lymph % (Auto)   


 


Mono % (Auto)   


 


Lymph # (Auto)   


 


Seg Neutrophils %   


 


Seg Neutrophils #   


 


D-Dimer   


 


Sodium   


 


Chloride   


 


BUN   


 


Creatinine   


 


Glucose   


 


Calcium   


 


Ferritin  737.7 H  


 


Lactate Dehydrogenase   636 H 


 


C-Reactive Protein   17.30 H 


 


Total Protein   


 


Albumin   


 


Coronavirus (PCR)    Positive A














  02/17/21 02/17/21 02/18/21





  04:14 04:14 05:19


 


WBC  11.9 H  


 


Lymph % (Auto)  5.4 L  


 


Mono % (Auto)   


 


Lymph # (Auto)  0.6 L  


 


Seg Neutrophils %  87.5 H  


 


Seg Neutrophils #  10.4 H  


 


D-Dimer   


 


Sodium    136 L


 


Chloride   


 


BUN   27 H  25 H


 


Creatinine   0.7 L  0.6 L


 


Glucose   138 H 


 


Calcium   8.3 L  7.8 L


 


Ferritin   


 


Lactate Dehydrogenase   


 


C-Reactive Protein   


 


Total Protein    5.3 L


 


Albumin    2.9 L


 


Coronavirus (PCR)

## 2021-02-18 NOTE — PROGRESS NOTE
Assessment and Plan


Assessment and plan: 


52 year old white male with no significant past medical problems currently 

incarcerated at [facility] seen in the emergency room with complaints 

of Cough, shortness of breath.


He denies any fever, no chills, no chest pain, no nausea ,no vomiting, no 

diarrhea.





Upon arrival in the emergency room , he was hypoxic with oxygen saturation of 

70%.





Work up in the ER reveals bilateral infiltrate, elevated D-dimer.





He has been started on empiric antibiotics and IV steroid.


He is also to be ruled out for COVID-19.





2/17: Patient continues on high flow oxygen.  Coronavirus testing came back 

positive.  Patient is on steroids awaiting ID for remdesivir treatment 

initiation.  He is also noted to have acute bilateral DVT and with the severity 

of his illness I anticipate that he probably has pulmonary embolism.  We will 

continue full anticoagulation at this time.  Pulmonologist input is and ID input

appreciated.  We will add some vitamin supplementation and will evaluate if 

diuresis is needed.  Okay for patient to start diet, showing the patient was 

n.p.o. yesterday.  Plan has been discussed with the patient and also the nursing

staff and the officer on duty.





2/18: Remains on High flow oxygen 40L/100%. Prognosis poor. Prone as tolerated, 

will add some vitamins, give a dose of lasix. Updated the long term physician Dr Sy. Will discuss with Pulmonary if we should proceed with upgrade to the 

[facility]. Give a dose of lasix








(1) Pneumonia


Current Visit: Yes   Status: Acute   


Plan to address problem: 


Patient placed on empiric antibiotics. Will await culture results.








(2) Acute respiratory failure with hypoxia


Current Visit: Yes   Status: Acute   


Plan to address problem: 


Probably secondary to the pneumonia  with Covid-19,


Currently on oxygen. Will maintain oxygen saturation greater than 94%


Consult placed to Pulmonologist for evaluation.








(3) 2019 novel coronavirus infection


Current Visit: Yes   Status: Acute   


Plan to address problem: 


Consult placed to infectious disease for evalution








(4) Sepsis


Current Visit: Yes   Status: Acute   


Plan to address problem: 


Secondary to pneumonia.


Will continue on antibiotics and monitor labs.








(5) DVT bilateral lower extremity


Current Visit: Yes   Status: Acute   


Plan to address problem: 


On full anticoagulation








(6) Full code status


Current Visit: Yes   Status: Acute   


Plan to address problem: 


Patient is a full code.





The high probability of a clinically significant, sudden or life threatening d

eterioration of the [pulmonary, vascular] system(s) required my full and direct 

attention, intervention and personal management. The aggregate critical care 

time was [35] minutes. This time is in addition to time spent performing 

reported procedures but includes the following: 





[x] Data Review and interpretation 





[x] Patient assessment and monitoring of vital signs 





[x] Documentation 





[x] Medication orders and management





History


Interval history: 


Patient seen and examined still with some shortness of breath.  Remains on high 

flow 100%





Hospitalist Physical





- Physical exam


Narrative exam: 


VITAL SIGNS:  Reviewed.    


GENERAL:  The patient appears normally developed, acutely ill-appearing, 

cachectic.  On high flow oxygen.  Vital signs as documented.


HEAD:  No signs of head trauma.


EYES:  Pupils are equal.  Extraocular motions intact.  


EARS:  Hearing grossly intact.


MOUTH:  Oropharynx is normal. 


NECK:  No adenopathy, no JVD.  


CHEST:  Chest with diminished breath sounds bilaterally.  No wheezes, rales, or 

rhonchi.  


CARDIAC:  Regular rate and rhythm.  S1 and S2, without murmurs, gallops, or 

rubs.


VASCULAR:  No Edema.  Peripheral pulses normal and equal in all extremities.


ABDOMEN:  Soft, non tender and non distended.  No   rebound or guarding, and no 

masses palpated.   Bowel Sounds normal.


MUSCULOSKELETAL:  Good range of motion of all major joints. Extremities without 

clubbing, cyanosis or edema.  


NEUROLOGIC EXAM:  Alert and oriented x 3   No focal sensory or strength 

deficits.   Speech normal.  Follows commands.


PSYCHIATRIC:  Mood normal.


SKIN:  detail exam as documented in skin assessment








- Constitutional


Vitals: 


                                        











Temp Pulse Resp BP Pulse Ox


 


 97.4 F L  71   20   103/67   94 


 


 02/18/21 04:39  02/18/21 04:39  02/18/21 04:39  02/18/21 04:39  02/18/21 04:39











General appearance: Present: no acute distress, well-nourished





HEART Score





- HEART Score


Troponin: 


                                        











Troponin T  < 0.010 ng/mL (0.00-0.029)   02/15/21  23:28    














Results





- Labs


CBC & Chem 7: 


                                 02/17/21 04:14





                                 02/18/21 05:19


Labs: 


                             Laboratory Last Values











WBC  11.9 K/mm3 (4.5-11.0)  H  02/17/21  04:14    


 


RBC  4.28 M/mm3 (3.65-5.03)   02/17/21  04:14    


 


Hgb  13.3 gm/dl (11.8-15.2)   02/17/21  04:14    


 


Hct  40.1 % (35.5-45.6)   02/17/21  04:14    


 


MCV  94 fl (84-94)   02/17/21  04:14    


 


MCH  31 pg (28-32)   02/17/21  04:14    


 


MCHC  33 % (32-34)   02/17/21  04:14    


 


RDW  14.1 % (13.2-15.2)   02/17/21  04:14    


 


Plt Count  220 K/mm3 (140-440)   02/17/21  04:14    


 


Lymph % (Auto)  5.4 % (13.4-35.0)  L  02/17/21  04:14    


 


Mono % (Auto)  6.9 % (0.0-7.3)   02/17/21  04:14    


 


Eos % (Auto)  0.0 % (0.0-4.3)   02/17/21  04:14    


 


Baso % (Auto)  0.2 % (0.0-1.8)   02/17/21  04:14    


 


Lymph # (Auto)  0.6 K/mm3 (1.2-5.4)  L  02/17/21  04:14    


 


Mono # (Auto)  0.8 K/mm3 (0.0-0.8)   02/17/21  04:14    


 


Eos # (Auto)  0.0 K/mm3 (0.0-0.4)   02/17/21  04:14    


 


Baso # (Auto)  0.0 K/mm3 (0.0-0.1)   02/17/21  04:14    


 


Seg Neutrophils %  87.5 % (40.0-70.0)  H  02/17/21  04:14    


 


Seg Neutrophils #  10.4 K/mm3 (1.8-7.7)  H  02/17/21  04:14    


 


PT  14.1 Sec. (12.2-14.9)   02/17/21  04:14    


 


INR  1.11  (0.87-1.13)   02/17/21  04:14    


 


APTT  26.5 Sec. (24.2-36.6)   02/15/21  23:28    


 


D-Dimer  6514.44 ng/mlDDU (0-234)  H  02/15/21  23:28    


 


Sodium  136 mmol/L (137-145)  L  02/18/21  05:19    


 


Potassium  4.3 mmol/L (3.6-5.0)   02/18/21  05:19    


 


Chloride  98.7 mmol/L ()   02/18/21  05:19    


 


Carbon Dioxide  27 mmol/L (22-30)   02/18/21  05:19    


 


Anion Gap  15 mmol/L  02/18/21  05:19    


 


BUN  25 mg/dL (9-20)  H  02/18/21  05:19    


 


Creatinine  0.6 mg/dL (0.8-1.3)  L  02/18/21  05:19    


 


Estimated GFR  > 60 ml/min  02/18/21  05:19    


 


BUN/Creatinine Ratio  42 %  02/18/21  05:19    


 


Glucose  98 mg/dL ()   02/18/21  05:19    


 


Lactic Acid  1.40 mmol/L (0.7-2.0)   02/16/21  04:51    


 


Calcium  7.8 mg/dL (8.4-10.2)  L  02/18/21  05:19    


 


Ferritin  737.7 ng/mL (30.0-300.0)  H  02/15/21  23:28    


 


Total Bilirubin  0.30 mg/dL (0.1-1.2)   02/18/21  05:19    


 


AST  32 units/L (5-40)   02/18/21  05:19    


 


ALT  21 units/L (7-56)   02/18/21  05:19    


 


Alkaline Phosphatase  66 units/L ()   02/18/21  05:19    


 


Lactate Dehydrogenase  636 units/L ()  H  02/15/21  23:28    


 


Troponin T  < 0.010 ng/mL (0.00-0.029)   02/15/21  23:28    


 


C-Reactive Protein  17.30 mg/dL (0.00-1.30)  H  02/15/21  23:28    


 


Total Protein  5.3 g/dL (6.3-8.2)  L  02/18/21  05:19    


 


Albumin  2.9 g/dL (3.9-5)  L  02/18/21  05:19    


 


Albumin/Globulin Ratio  1.2 %  02/18/21  05:19    


 


Procalcitonin  0.46 ng/mL (<0.15)   02/15/21  23:28    


 


TSH  0.573 mlU/mL (0.270-4.200)   02/16/21  13:51    


 


Free T4  1.21 ng/dL (0.76-1.46)   02/16/21  13:51    


 


Nasal Screen MRSA (PCR)  Negative  (Negative)   02/16/21  Unknown


 


Coronavirus (PCR)  Positive  (Negative)  A  02/16/21  10:18    











Microbiology: 


Microbiology





02/15/21 23:28   Peripheral/Venous   Blood Culture - Preliminary


                            NO GROWTH AFTER 48 HOURS


02/15/21 23:19   Peripheral/Venous   Blood Culture - Preliminary


                            NO GROWTH AFTER 48 HOURS








Godwin/IV: 


                                        





Voiding Method                   Urinal











Active Medications





- Current Medications


Current Medications: 














Generic Name Dose Route Start Last Admin





  Trade Name Freq  PRN Reason Stop Dose Admin


 


Acetaminophen  650 mg  02/16/21 00:57 





  Acetaminophen 325 Mg Tab  PO  





  Q4H PRN  





  Pain MILD(1-3)/Fever >100.5/HA  


 


Azithromycin  500 mg  02/16/21 22:00  02/17/21 21:51





  Azithromycin 250 Mg Tab  PO  02/19/21 22:01  500 mg





  QHS YRN   Administration


 


Dexamethasone  6 mg  02/16/21 10:00  02/17/21 11:08





  Dexamethasone 4 Mg/Ml Vial  IV  02/25/21 10:01  6 mg





  Q24HR YRN   Administration


 


Enoxaparin Sodium  70 mg  02/16/21 11:30  02/17/21 21:51





  Enoxaparin 80 Mg/0.8 Ml Inj  SUB-Q   70 mg





  Q12HR YRN   Administration


 


Ceftriaxone Sodium  2 gm in 100 mls @ 200 mls/hr  02/16/21 22:00  02/17/21 21:50





  Rocephin/Ns 2 Gm/100 Ml  IV   200 mls/hr





  Q24H YRN   Administration





  Protocol  


 


REMDESIVIR 100 mg/ Sodium  250 mls @ 500 mls/hr  02/18/21 21:00 





  Chloride  IV  02/21/21 21:29 





  Q24HR@2100 YRN  


 


Magnesium Hydroxide  30 ml  02/16/21 00:57 





  Magnesium Hydroxide (Mom) Oral Liqd Udc  PO  





  Q4H PRN  





  Constipation  


 


Morphine Sulfate  2 mg  02/16/21 00:57 





  Morphine 2 Mg/1 Ml Inj  IV  





  Q4H PRN  





  Pain, Moderate (4-6)  


 


Ondansetron HCl  4 mg  02/16/21 00:57 





  Ondansetron 4 Mg/2 Ml Inj  IV  





  Q8H PRN  





  Nausea And Vomiting  


 


Sodium Chloride  10 ml  02/16/21 10:00  02/17/21 21:51





  Sodium Chloride 0.9% 10 Ml Flush Syringe  IV   10 ml





  BID YRN   Administration


 


Sodium Chloride  10 ml  02/16/21 00:57 





  Sodium Chloride 0.9% 10 Ml Flush Syringe  IV  





  PRN PRN  





  LINE FLUSH  


 


Sodium Chloride  50 ml  02/17/21 11:30  02/17/21 11:08





  Sodium Chloride 0.9% 50 Ml Ivpb  IV  02/21/21 21:01  50 ml





  Q24HR@2100 YRN   Administration














Nutrition/Malnutrition Assess





- Dietary Evaluation


Nutrition/Malnutrition Findings: 


                                        





Nutrition Notes                                            Start:  02/16/21 

12:36


Freq:                                                      Status: Active       

 


Protocol:                                                                       

 


 Document     02/16/21 13:00  AT  (Rec: 02/16/21 13:08  AT  [id number])


 Co-Sign      02/16/21 13:00  LP


 Nutrition Notes


     Need for Assessment generated from:         RN Referral


     Initial or Follow up                        Assessment


     Current Diagnosis                           Sepsis,Respiratory Failure


     Other Pertinent Diagnosis                   COVID PUI, SOB, PNA


     Current Diet                                Regular Diet


     Labs/Tests                                  2/15


                                                 Na 135


                                                 Cr 0.7


     Pertinent Medications                       Decadron


     Height                                      5 ft 9 in


     Weight                                      68.039 kg


     Ideal Body Weight (kg)                      72.72


     BMI                                         22.1


     Intake Prior to Admission                   Poor


     Weight Status                               Appropriate


     Subjective/Other Information                Screen for Vikash Score and


                                                 MST of 2. Was unable to reach


                                                 pt by phone x2. Per chart, pt


                                                 has no PMH to evaluate. Per RN


                                                 , pt has had poor appetite PTA


                                                 and currently; pt consumed


                                                 100% of breakfast and lunch,


                                                 but took sips of juice and


                                                 water. Per chart, pt is


                                                 wheezing with labored


                                                 breathing. RN reports pt has


                                                 no wounds, but pt has 2 Vikash


                                                 scores for today, 20 and 15,


                                                 respectively. Per chart, pt


                                                 reports having no BM for


                                                 several days, writer discussed


                                                 this with RN.


     Burn                                        Absent


     Trauma                                      Absent


     GI Symptoms                                 Last BM


     Food Allergy                                No


     Current % PO                                Negligible


     Minimum of two criteria                     No


     Reduced  Strength                       Measurably Reduced (severe)


     #1


      Nutrition Diagnosis                        Predicted suboptimal energy


                                                 intake


      Etiology                                   ARF


      As Evidenced by Signs and Symptoms         pt reports decreased appetite


                                                 PTA and consumed 0% at


                                                 breakfast


     Is patient on ventilator?                   No


     Is Patient Ambulatory and/or Out of Bed     No


     REE-([facility]-confined to bed)      [phone]


     Calculation Used for Recommendations        [facility]


     Additional Notes                            PRO needs: 68-82g (1-1.2 g/kg)


                                                 Fluid needs: 1 mL/kcal or per


                                                 MD


 Nutrition Intervention


     Change Diet Order:                          Continue Regular Diet


     Add Supplement/Snack (indicate name/kcal    Ensure Enlive BID


      /protein )                                 


     Provides kCal:                              700


     Provides Protein (gm)                       40


     Goal #1                                     Maintain weight


     Goal #2                                     Meet at least 75% of estimated


                                                 energy and protein needs via


                                                 diet and ONS


     Anticipated Discharge Needs:                Regular diet


     Follow-Up By:                               02/18/21


     Additional Comments                         F/U for intakes and ONS


                                                 tolerance, BM

## 2021-02-18 NOTE — PROGRESS NOTE
Assessment and Plan





Cultures:


Blood culture no growth so far





A/P: 65 man no past medical history admitted as Covid PUI





#Covid pneumonia: Elevated procalcitonin as well.





#Acute hypoxic respiratory failure: Saturations down to the 80s on admission.  

On high flow nasal cannula








Recs:


-Continue dexamethasone 6 mg every 24 hours to complete 10 days.


-Continue ceftriaxone and azithromycin to complete 5 and 3 days respectively.


-Start remdesivir, complete 5 days.  Monitor renal and hepatic function.  Day 2 

of 5.


-Obtain inflammatory markers every 48-72 hours.


-Anticoagulation per hospital protocol


-Proning as able





Thank you for the consult, we will continue to follow.





ANDRES Horvath MD


[facility] ([facility]


O: [phone]


F: [phone]





 








Subjective


Date of service: 02/18/21


Interval history: 





White countAfebrile, elevated at 11.9.  Remains on high flow nasal cannula.





Objective





- Exam


Narrative Exam: 





Physical exam deferred due to PPE conservation strategy.  Please refer to 

primary team's note.





- Constitutional


Vitals: 


                                   Vital Signs











Temp Pulse Resp BP Pulse Ox


 


 97.4 F L  76   20   97/66   95 


 


 02/18/21 04:39  02/18/21 10:00  02/18/21 04:39  02/18/21 10:00  02/18/21 10:00








                           Temperature -Last 24 Hours











Temperature                    97.4 F


 


Temperature                    97.4 F


 


Temperature                    97.6 F

















- Labs


CBC & Chem 7: 


                                 02/17/21 04:14





                                 02/18/21 05:19


Labs: 


                              Abnormal lab results











  02/18/21 Range/Units





  05:19 


 


Sodium  136 L  (137-145)  mmol/L


 


BUN  25 H  (9-20)  mg/dL


 


Creatinine  0.6 L  (0.8-1.3)  mg/dL


 


Calcium  7.8 L  (8.4-10.2)  mg/dL


 


Total Protein  5.3 L  (6.3-8.2)  g/dL


 


Albumin  2.9 L  (3.9-5)  g/dL

## 2021-02-19 LAB
ALBUMIN SERPL-MCNC: 2.7 G/DL (ref 3.9–5)
ALT SERPL-CCNC: 20 UNITS/L (ref 7–56)
BUN SERPL-MCNC: 22 MG/DL (ref 9–20)
BUN/CREAT SERPL: 44 %
CALCIUM SERPL-MCNC: 7.9 MG/DL (ref 8.4–10.2)
CRP SERPL-MCNC: 3.1 MG/DL (ref 0–1.3)
HEMOLYSIS INDEX: 0

## 2021-02-19 RX ADMIN — SODIUM CHLORIDE SCH ML: 9 INJECTION, SOLUTION INTRAVENOUS at 22:16

## 2021-02-19 RX ADMIN — OXYCODONE HYDROCHLORIDE AND ACETAMINOPHEN SCH MG: 500 TABLET ORAL at 21:37

## 2021-02-19 RX ADMIN — Medication SCH ML: at 10:12

## 2021-02-19 RX ADMIN — CEFTRIAXONE SODIUM SCH MLS/HR: 2 INJECTION, POWDER, FOR SOLUTION INTRAMUSCULAR; INTRAVENOUS at 23:17

## 2021-02-19 RX ADMIN — ENOXAPARIN SODIUM SCH MG: 100 INJECTION SUBCUTANEOUS at 21:35

## 2021-02-19 RX ADMIN — Medication SCH MG: at 10:12

## 2021-02-19 RX ADMIN — Medication SCH ML: at 21:37

## 2021-02-19 RX ADMIN — ENOXAPARIN SODIUM SCH MG: 100 INJECTION SUBCUTANEOUS at 10:11

## 2021-02-19 RX ADMIN — REMDESIVIR SCH MLS/HR: 100 INJECTION, POWDER, LYOPHILIZED, FOR SOLUTION INTRAVENOUS at 21:35

## 2021-02-19 RX ADMIN — CHOLECALCIFEROL TAB 125 MCG (5000 UNIT) SCH UNIT: 125 TAB at 10:11

## 2021-02-19 RX ADMIN — OXYCODONE HYDROCHLORIDE AND ACETAMINOPHEN SCH MG: 500 TABLET ORAL at 10:11

## 2021-02-19 RX ADMIN — CEFTRIAXONE SODIUM SCH MLS/HR: 2 INJECTION, POWDER, FOR SOLUTION INTRAMUSCULAR; INTRAVENOUS at 00:33

## 2021-02-19 NOTE — PROGRESS NOTE
Assessment and Plan





Cultures:


Blood culture no growth so far





A/P: 65 man no past medical history admitted as Covid PUI





#Covid pneumonia: Elevated procalcitonin as well.





#Acute hypoxic respiratory failure: Saturations down to the 80s on admission.  

On high flow nasal cannula








Recs:


-Continue dexamethasone 6 mg every 24 hours to complete 10 days.


-Continue ceftriaxone and azithromycin to complete 5 and 3 days respectively.


-Start remdesivir, complete 5 days.  Monitor renal and hepatic function.  Day 3 

of 5.


-Obtain inflammatory markers every 48-72 hours.


-Anticoagulation per hospital protocol


-Proning as able





Thank you for the consult, we will continue to follow.





ANDRES Horvath MD


[facility] ([facility]


O: [phone]


F: [phone]





 








Subjective


Date of service: 02/19/21


Interval history: 





Afebrile, normal whtie count. 





Objective





- Exam


Narrative Exam: 





Physical exam deferred due to PPE conservation strategy.  Please refer to 

primary team's note.





- Constitutional


Vitals: 


                                   Vital Signs











Temp Pulse Resp BP Pulse Ox


 


 97.0 F L  59 L  17   108/64   92 


 


 02/19/21 05:33  02/19/21 05:33  02/19/21 05:33  02/19/21 05:33  02/19/21 09:55








                           Temperature -Last 24 Hours











Temperature                    97.0 F


 


Temperature                    97.7 F


 


Temperature                    97.2 F

















- Labs


CBC & Chem 7: 


                                 02/17/21 04:14





                                 02/19/21 05:26


Labs: 


                              Abnormal lab results











  02/19/21 02/19/21 02/19/21 Range/Units





  05:26 05:26 05:26 


 


D-Dimer   5476.96 H   (0-234)  ng/mlDDU


 


Carbon Dioxide  31 H    (22-30)  mmol/L


 


BUN  22 H    (9-20)  mg/dL


 


Creatinine  0.5 L    (0.8-1.3)  mg/dL


 


Glucose  117 H    ()  mg/dL


 


Calcium  7.9 L    (8.4-10.2)  mg/dL


 


Ferritin    382.0 H  (30.0-300.0)  ng/mL


 


Lactate Dehydrogenase     ()  units/L


 


C-Reactive Protein     (0.00-1.30)  mg/dL


 


Total Protein  5.8 L    (6.3-8.2)  g/dL


 


Albumin  2.7 L    (3.9-5)  g/dL














  02/19/21 Range/Units





  05:26 


 


D-Dimer   (0-234)  ng/mlDDU


 


Carbon Dioxide   (22-30)  mmol/L


 


BUN   (9-20)  mg/dL


 


Creatinine   (0.8-1.3)  mg/dL


 


Glucose   ()  mg/dL


 


Calcium   (8.4-10.2)  mg/dL


 


Ferritin   (30.0-300.0)  ng/mL


 


Lactate Dehydrogenase  424 H  ()  units/L


 


C-Reactive Protein  3.10 H  (0.00-1.30)  mg/dL


 


Total Protein   (6.3-8.2)  g/dL


 


Albumin   (3.9-5)  g/dL

## 2021-02-19 NOTE — PROGRESS NOTE
Assessment and Plan





Agree with COVID precautions


Wean FiO2 for sats >88%


Agree with steroids


Prone as tolerated during the day and sleep prone at night.


Guarded prognosis.





Subjective


Date of service: 02/19/21


Interval history: 





Still on HFNC, not weaned from 100.  Sats are good but patient complains of SOB.

 





Objective


                               Vital Signs - 12hr











  02/19/21 02/19/21 02/19/21





  04:01 05:33 09:55


 


Temperature  97.0 F L 


 


Pulse Rate  59 L 


 


Respiratory  17 





Rate   


 


Blood Pressure  108/64 


 


O2 Sat by Pulse 96 95 92





Oximetry   











CBC and BMP: 


                                 02/17/21 04:14





                                 02/19/21 05:26


ABG, PT/INR, D-dimer: 


PT/INR, D-dimer











PT  14.1 Sec. (12.2-14.9)   02/17/21  04:14    


 


INR  1.11  (0.87-1.13)   02/17/21  04:14    


 


D-Dimer  5476.96 ng/mlDDU (0-234)  H  02/19/21  05:26    








Abnormal lab findings: 


                                  Abnormal Labs











  02/15/21 02/15/21 02/15/21





  23:28 23:28 23:28


 


WBC   


 


Lymph % (Auto)  4.1 L  


 


Mono % (Auto)  7.8 H  


 


Lymph # (Auto)  0.3 L  


 


Seg Neutrophils %  86.9 H  


 


Seg Neutrophils #   


 


D-Dimer   6514.44 H 


 


Sodium    135 L


 


Chloride    95.2 L


 


Carbon Dioxide   


 


BUN   


 


Creatinine    0.7 L


 


Glucose   


 


Calcium   


 


Ferritin   


 


Lactate Dehydrogenase   


 


C-Reactive Protein   


 


Total Protein   


 


Albumin    3.0 L


 


Coronavirus (PCR)   














  02/15/21 02/15/21 02/16/21





  23:28 23:28 10:18


 


WBC   


 


Lymph % (Auto)   


 


Mono % (Auto)   


 


Lymph # (Auto)   


 


Seg Neutrophils %   


 


Seg Neutrophils #   


 


D-Dimer   


 


Sodium   


 


Chloride   


 


Carbon Dioxide   


 


BUN   


 


Creatinine   


 


Glucose   


 


Calcium   


 


Ferritin  737.7 H  


 


Lactate Dehydrogenase   636 H 


 


C-Reactive Protein   17.30 H 


 


Total Protein   


 


Albumin   


 


Coronavirus (PCR)    Positive A














  02/17/21 02/17/21 02/18/21





  04:14 04:14 05:19


 


WBC  11.9 H  


 


Lymph % (Auto)  5.4 L  


 


Mono % (Auto)   


 


Lymph # (Auto)  0.6 L  


 


Seg Neutrophils %  87.5 H  


 


Seg Neutrophils #  10.4 H  


 


D-Dimer   


 


Sodium    136 L


 


Chloride   


 


Carbon Dioxide   


 


BUN   27 H  25 H


 


Creatinine   0.7 L  0.6 L


 


Glucose   138 H 


 


Calcium   8.3 L  7.8 L


 


Ferritin   


 


Lactate Dehydrogenase   


 


C-Reactive Protein   


 


Total Protein    5.3 L


 


Albumin    2.9 L


 


Coronavirus (PCR)   














  02/19/21 02/19/21 02/19/21





  05:26 05:26 05:26


 


WBC   


 


Lymph % (Auto)   


 


Mono % (Auto)   


 


Lymph # (Auto)   


 


Seg Neutrophils %   


 


Seg Neutrophils #   


 


D-Dimer   5476.96 H 


 


Sodium   


 


Chloride   


 


Carbon Dioxide  31 H  


 


BUN  22 H  


 


Creatinine  0.5 L  


 


Glucose  117 H  


 


Calcium  7.9 L  


 


Ferritin    382.0 H


 


Lactate Dehydrogenase   


 


C-Reactive Protein   


 


Total Protein  5.8 L  


 


Albumin  2.7 L  


 


Coronavirus (PCR)   














  02/19/21





  05:26


 


WBC 


 


Lymph % (Auto) 


 


Mono % (Auto) 


 


Lymph # (Auto) 


 


Seg Neutrophils % 


 


Seg Neutrophils # 


 


D-Dimer 


 


Sodium 


 


Chloride 


 


Carbon Dioxide 


 


BUN 


 


Creatinine 


 


Glucose 


 


Calcium 


 


Ferritin 


 


Lactate Dehydrogenase  424 H


 


C-Reactive Protein  3.10 H


 


Total Protein 


 


Albumin 


 


Coronavirus (PCR)

## 2021-02-20 LAB
ALBUMIN SERPL-MCNC: 2.8 G/DL (ref 3.9–5)
ALT SERPL-CCNC: 46 UNITS/L (ref 7–56)
BUN SERPL-MCNC: 26 MG/DL (ref 9–20)
BUN/CREAT SERPL: 52 %
CALCIUM SERPL-MCNC: 7.7 MG/DL (ref 8.4–10.2)
HEMOLYSIS INDEX: 8

## 2021-02-20 RX ADMIN — OXYCODONE HYDROCHLORIDE AND ACETAMINOPHEN SCH MG: 500 TABLET ORAL at 21:39

## 2021-02-20 RX ADMIN — CHOLECALCIFEROL TAB 125 MCG (5000 UNIT) SCH UNIT: 125 TAB at 09:36

## 2021-02-20 RX ADMIN — REMDESIVIR SCH MLS/HR: 100 INJECTION, POWDER, LYOPHILIZED, FOR SOLUTION INTRAVENOUS at 21:38

## 2021-02-20 RX ADMIN — Medication SCH ML: at 21:38

## 2021-02-20 RX ADMIN — SODIUM CHLORIDE SCH ML: 9 INJECTION, SOLUTION INTRAVENOUS at 21:40

## 2021-02-20 RX ADMIN — OXYCODONE HYDROCHLORIDE AND ACETAMINOPHEN SCH MG: 500 TABLET ORAL at 09:36

## 2021-02-20 RX ADMIN — Medication SCH ML: at 09:35

## 2021-02-20 RX ADMIN — ENOXAPARIN SODIUM SCH MG: 100 INJECTION SUBCUTANEOUS at 21:38

## 2021-02-20 RX ADMIN — ENOXAPARIN SODIUM SCH MG: 100 INJECTION SUBCUTANEOUS at 09:33

## 2021-02-20 RX ADMIN — Medication SCH MG: at 09:36

## 2021-02-20 NOTE — PROGRESS NOTE
Assessment and Plan


Assessment and plan: 


52 year old white male with no significant past medical problems currently 

incarcerated at [facility] seen in the emergency room with complaints 

of Cough, shortness of breath.


He denies any fever, no chills, no chest pain, no nausea ,no vomiting, no 

diarrhea.





Upon arrival in the emergency room , he was hypoxic with oxygen saturation of 

70%.





Work up in the ER reveals bilateral infiltrate, elevated D-dimer.





He has been started on empiric antibiotics and IV steroid.


He is also to be ruled out for COVID-19.





2/17: Patient continues on high flow oxygen.  Coronavirus testing came back 

positive.  Patient is on steroids awaiting ID for remdesivir treatment 

initiation.  He is also noted to have acute bilateral DVT and with the severity 

of his illness I anticipate that he probably has pulmonary embolism.  We will 

continue full anticoagulation at this time.  Pulmonologist input is and ID input

appreciated.  We will add some vitamin supplementation and will evaluate if 

diuresis is needed.  Okay for patient to start diet, showing the patient was 

n.p.o. yesterday.  Plan has been discussed with the patient and also the nursing

staff and the officer on duty.





2/18: Remains on High flow oxygen 40L/100%. Prognosis poor. Prone as tolerated, 

will add some vitamins, give a dose of lasix. Updated the [facility] physician Dr Sy. Will discuss with Pulmonary if we should proceed with upgrade to the 

[facility]. Give a dose of lasix





2/20: Continue supportive care.  Continue to encourage prone position as 

tolerated patient cellulitis limited due to injuries he sustained as a child and

since then has not been able to lay on his stomach.  We will continue weaning 

oxygen as tolerated.








(1) Pneumonia


Current Visit: Yes   Status: Acute   


Plan to address problem: 


Patient placed on empiric antibiotics. Will await culture results.








(2) Acute respiratory failure with hypoxia


Current Visit: Yes   Status: Acute   


Plan to address problem: 


Probably secondary to the pneumonia  with Covid-19,


Currently on oxygen. Will maintain oxygen saturation greater than 94%


Consult placed to Pulmonologist for evaluation.








(3) 2019 novel coronavirus infection


Current Visit: Yes   Status: Acute   


Plan to address problem: 


Consult placed to infectious disease for evalution








(4) Sepsis


Current Visit: Yes   Status: Acute   


Plan to address problem: 


Secondary to pneumonia.


Will continue on antibiotics and monitor labs.








(5) DVT bilateral lower extremity


Current Visit: Yes   Status: Acute   


Plan to address problem: 


On full anticoagulation








(6) Full code status


Current Visit: Yes   Status: Acute   


Plan to address problem: 


Patient is a full code.





The high probability of a clinically significant, sudden or life threatening 

deterioration of the [pulmonary, vascular] system(s) required my full and direct

attention, intervention and personal management. The aggregate critical care 

time was [35] minutes. This time is in addition to time spent performing 

reported procedures but includes the following: 





[x] Data Review and interpretation 





[x] Patient assessment and monitoring of vital signs 





[x] Documentation 





[x] Medication orders and management





History


Interval history: 


Patient seen and examined still with some shortness of breath.  Remains on high 

flow 100% oxygen saturation of 91%





Hospitalist Physical





- Physical exam


Narrative exam: 


VITAL SIGNS:  Reviewed.    


GENERAL:  The patient appears normally developed, acutely ill-appearing, 

cachectic.  On high flow oxygen.  Vital signs as documented.


HEAD:  No signs of head trauma.


EYES:  Pupils are equal.  Extraocular motions intact.  


EARS:  Hearing grossly intact.


MOUTH:  Oropharynx is normal. 


NECK:  No adenopathy, no JVD.  


CHEST:  Chest with diminished breath sounds bilaterally.  No wheezes, rales, or 

rhonchi.  


CARDIAC:  Regular rate and rhythm.  S1 and S2, without murmurs, gallops, or rubs

.


VASCULAR:  No Edema.  Peripheral pulses normal and equal in all extremities.


ABDOMEN:  Soft, non tender and non distended.  No   rebound or guarding, and no 

masses palpated.   Bowel Sounds normal.


MUSCULOSKELETAL:  Good range of motion of all major joints. Extremities without 

clubbing, cyanosis or edema.  


NEUROLOGIC EXAM:  Alert and oriented x 3   No focal sensory or strength 

deficits.   Speech normal.  Follows commands.


PSYCHIATRIC:  Mood normal.


SKIN:  detail exam as documented in skin assessment








- Constitutional


Vitals: 


                                        











Temp Pulse Resp BP Pulse Ox


 


 97.4 F L  71   20   111/69   96 


 


 02/20/21 06:08  02/20/21 06:08  02/20/21 06:08  02/20/21 06:08  02/20/21 06:08











General appearance: Present: no acute distress, well-nourished





HEART Score





- HEART Score


Troponin: 


                                        











Troponin T  < 0.010 ng/mL (0.00-0.029)   02/15/21  23:28    














Results





- Labs


CBC & Chem 7: 


                                 02/17/21 04:14





                                 02/20/21 05:50


Labs: 


                             Laboratory Last Values











WBC  11.9 K/mm3 (4.5-11.0)  H  02/17/21  04:14    


 


RBC  4.28 M/mm3 (3.65-5.03)   02/17/21  04:14    


 


Hgb  13.3 gm/dl (11.8-15.2)   02/17/21  04:14    


 


Hct  40.1 % (35.5-45.6)   02/17/21  04:14    


 


MCV  94 fl (84-94)   02/17/21  04:14    


 


MCH  31 pg (28-32)   02/17/21  04:14    


 


MCHC  33 % (32-34)   02/17/21  04:14    


 


RDW  14.1 % (13.2-15.2)   02/17/21  04:14    


 


Plt Count  220 K/mm3 (140-440)   02/17/21  04:14    


 


Lymph % (Auto)  5.4 % (13.4-35.0)  L  02/17/21  04:14    


 


Mono % (Auto)  6.9 % (0.0-7.3)   02/17/21  04:14    


 


Eos % (Auto)  0.0 % (0.0-4.3)   02/17/21  04:14    


 


Baso % (Auto)  0.2 % (0.0-1.8)   02/17/21  04:14    


 


Lymph # (Auto)  0.6 K/mm3 (1.2-5.4)  L  02/17/21  04:14    


 


Mono # (Auto)  0.8 K/mm3 (0.0-0.8)   02/17/21  04:14    


 


Eos # (Auto)  0.0 K/mm3 (0.0-0.4)   02/17/21  04:14    


 


Baso # (Auto)  0.0 K/mm3 (0.0-0.1)   02/17/21  04:14    


 


Seg Neutrophils %  87.5 % (40.0-70.0)  H  02/17/21  04:14    


 


Seg Neutrophils #  10.4 K/mm3 (1.8-7.7)  H  02/17/21  04:14    


 


PT  14.1 Sec. (12.2-14.9)   02/17/21  04:14    


 


INR  1.11  (0.87-1.13)   02/17/21  04:14    


 


APTT  26.5 Sec. (24.2-36.6)   02/15/21  23:28    


 


D-Dimer  5476.96 ng/mlDDU (0-234)  H  02/19/21  05:26    


 


Sodium  140 mmol/L (137-145)   02/20/21  05:50    


 


Potassium  4.7 mmol/L (3.6-5.0)   02/20/21  05:50    


 


Chloride  103.4 mmol/L ()   02/20/21  05:50    


 


Carbon Dioxide  30 mmol/L (22-30)   02/20/21  05:50    


 


Anion Gap  11 mmol/L  02/20/21  05:50    


 


BUN  26 mg/dL (9-20)  H  02/20/21  05:50    


 


Creatinine  0.5 mg/dL (0.8-1.3)  L  02/20/21  05:50    


 


Estimated GFR  > 60 ml/min  02/20/21  05:50    


 


BUN/Creatinine Ratio  52 %  02/20/21  05:50    


 


Glucose  88 mg/dL ()   02/20/21  05:50    


 


Lactic Acid  1.40 mmol/L (0.7-2.0)   02/16/21  04:51    


 


Calcium  7.7 mg/dL (8.4-10.2)  L  02/20/21  05:50    


 


Ferritin  382.0 ng/mL (30.0-300.0)  H  02/19/21  05:26    


 


Total Bilirubin  0.20 mg/dL (0.1-1.2)   02/20/21  05:50    


 


AST  64 units/L (5-40)  H  02/20/21  05:50    


 


ALT  46 units/L (7-56)   02/20/21  05:50    


 


Alkaline Phosphatase  64 units/L ()   02/20/21  05:50    


 


Lactate Dehydrogenase  424 units/L ()  H  02/19/21  05:26    


 


Troponin T  < 0.010 ng/mL (0.00-0.029)   02/15/21  23:28    


 


C-Reactive Protein  3.10 mg/dL (0.00-1.30)  H  02/19/21  05:26    


 


Total Protein  5.3 g/dL (6.3-8.2)  L  02/20/21  05:50    


 


Albumin  2.8 g/dL (3.9-5)  L  02/20/21  05:50    


 


Albumin/Globulin Ratio  1.1 %  02/20/21  05:50    


 


Procalcitonin  0.46 ng/mL (<0.15)   02/15/21  23:28    


 


TSH  0.573 mlU/mL (0.270-4.200)   02/16/21  13:51    


 


Free T4  1.21 ng/dL (0.76-1.46)   02/16/21  13:51    


 


Nasal Screen MRSA (PCR)  Negative  (Negative)   02/16/21  Unknown


 


Coronavirus (PCR)  Positive  (Negative)  A  02/16/21  10:18    











Microbiology: 


Microbiology





02/15/21 23:28   Peripheral/Venous   Blood Culture - Preliminary


                            NO GROWTH AFTER 4 DAYS


02/15/21 23:19   Peripheral/Venous   Blood Culture - Preliminary


                            NO GROWTH AFTER 4 DAYS








Godwin/IV: 


                                        





Voiding Method                   Urinal











Active Medications





- Current Medications


Current Medications: 














Generic Name Dose Route Start Last Admin





  Trade Name Freq  PRN Reason Stop Dose Admin


 


Acetaminophen  650 mg  02/16/21 00:57 





  Acetaminophen 325 Mg Tab  PO  





  Q4H PRN  





  Pain MILD(1-3)/Fever >100.5/HA  


 


Ascorbic Acid  1,000 mg  02/18/21 10:00  02/20/21 09:36





  Ascorbic Acid 500 Mg Tab  PO   1,000 mg





  BID YRN   Administration


 


Cholecalciferol  5,000 unit  02/18/21 10:00  02/20/21 09:36





  Cholecalciferol (Vit D3) 5,000 Unit Tab  PO   5,000 unit





  DAILY YRN   Administration


 


Dexamethasone  6 mg  02/16/21 10:00  02/20/21 09:33





  Dexamethasone 4 Mg/Ml Vial  IV  02/25/21 10:01  6 mg





  Q24HR YRN   Administration


 


Enoxaparin Sodium  70 mg  02/16/21 11:30  02/20/21 09:33





  Enoxaparin 80 Mg/0.8 Ml Inj  SUB-Q   70 mg





  Q12HR YRN   Administration


 


REMDESIVIR 100 mg/ Sodium  250 mls @ 500 mls/hr  02/18/21 21:00  02/19/21 21:35





  Chloride  IV  02/21/21 21:29  500 mls/hr





  Q24HR@2100 YRN   Administration


 


Magnesium Hydroxide  30 ml  02/16/21 00:57 





  Magnesium Hydroxide (Mom) Oral Liqd Udc  PO  





  Q4H PRN  





  Constipation  


 


Morphine Sulfate  2 mg  02/16/21 00:57 





  Morphine 2 Mg/1 Ml Inj  IV  





  Q4H PRN  





  Pain, Moderate (4-6)  


 


Ondansetron HCl  4 mg  02/16/21 00:57 





  Ondansetron 4 Mg/2 Ml Inj  IV  





  Q8H PRN  





  Nausea And Vomiting  


 


Sodium Chloride  10 ml  02/16/21 10:00  02/20/21 09:35





  Sodium Chloride 0.9% 10 Ml Flush Syringe  IV   10 ml





  BID YRN   Administration


 


Sodium Chloride  10 ml  02/16/21 00:57 





  Sodium Chloride 0.9% 10 Ml Flush Syringe  IV  





  PRN PRN  





  LINE FLUSH  


 


Sodium Chloride  50 ml  02/17/21 11:30  02/19/21 22:16





  Sodium Chloride 0.9% 50 Ml Ivpb  IV  02/21/21 21:01  50 ml





  Q24HR@2100 YRN   Administration


 


Zinc Sulfate  220 mg  02/18/21 10:00  02/20/21 09:36





  Zinc Sulfate 220 Mg Cap  PO   220 mg





  QDAY YRN   Administration














Nutrition/Malnutrition Assess





- Dietary Evaluation


Nutrition/Malnutrition Findings: 


                                        





Nutrition Notes                                            Start:  02/16/21 

12:36


Freq:                                                      Status: Active       

 


Protocol:                                                                       

 


 Document     02/18/21 13:19  AT  (Rec: 02/18/21 13:28  AT  [id number])


 Co-Sign      02/18/21 13:19  MK


 Nutrition Notes


     Initial or Follow up                        Reassessment


     Current Diagnosis                           Sepsis,Respiratory Failure


     Other Pertinent Diagnosis                   COVID(+), SOB, Pneumonia


     Current Diet                                Regular Diet


     Labs/Tests                                  Na 136


                                                 BUN 25


                                                 Cr 0.6


     Pertinent Medications                       Zinc Sulfate


                                                 Vit D3


                                                 Vit C


                                                 Lasix


                                                 Decadron


     Height                                      5 ft 9 in


     Weight                                      68.039 kg


     Ideal Body Weight (kg)                      72.72


     BMI                                         22.1


     Intake Prior to Admission                   Poor


     Weight Status                               Appropriate


     Subjective/Other Information                Follow up for intakes, ONS


                                                 tolerance, BM. Was unable to


                                                 reach pt by the phone x2. Per


                                                 chart, pt is consuming 100% of


                                                 meals and ONS. Per RN, the pt


                                                 has no wounds, but has a


                                                 Vikash Score of 16. Per chart,


                                                 pt had a normal BM yesterday.


     Percent of energy/protein needs met:        100%/100%


     Burn                                        Absent


     Trauma                                      Absent


     GI Symptoms                                 None


     Food Allergy                                No


     Current % PO                                Good (%)


     Minimum of two criteria                     No


     Reduced  Strength                       Measurably Reduced (severe)


     #2


      Nutrition Diagnosis                        No nutrition diagnosis at this


                                                 time


     #1


      Nutrition Diagnosis                        Predicted suboptimal energy


                                                 intake


      As Evidenced by Signs and Symptoms         pt consuming 100% of meals and


                                                 ONS x2 days


      Diagnosis Progress(for reassessment        Resolved


       documentation)                            


     Is patient on ventilator?                   No


     Is Patient Ambulatory and/or Out of Bed     No


     REE-([facility]-confined to bed)      [phone]


     Calculation Used for Recommendations        [facility]


     Additional Notes                            PRO needs: 68-82g (1-1.2 g/kg)


                                                 Fluid needs: 1 mL/kcal or per


                                                 MD


 Nutrition Intervention


     Change Diet Order:                          Continue Regular Diet


     Add Supplement/Snack (indicate name/kcal    Ensure Enlive BID


      /protein )                                 


     Provides kCal:                              700


     Provides Protein (gm)                       40


     Goal #1                                     Maintain weight


     Goal #2                                     Meet at least 75% of estimated


                                                 energy and protein needs via


                                                 diet and ONS


     Anticipated Discharge Needs:                Regular diet


     Follow-Up By:                               02/25/21


     Additional Comments                         F/U for stable intakes

## 2021-02-20 NOTE — PROGRESS NOTE
Assessment and Plan


Assessment and plan: 


52 year old white male with no significant past medical problems currently 

incarcerated at [facility] seen in the emergency room with complaints 

of Cough, shortness of breath.


He denies any fever, no chills, no chest pain, no nausea ,no vomiting, no 

diarrhea.





Upon arrival in the emergency room , he was hypoxic with oxygen saturation of 

70%.





Work up in the ER reveals bilateral infiltrate, elevated D-dimer.





He has been started on empiric antibiotics and IV steroid.


He is also to be ruled out for COVID-19.





2/17: Patient continues on high flow oxygen.  Coronavirus testing came back 

positive.  Patient is on steroids awaiting ID for remdesivir treatment 

initiation.  He is also noted to have acute bilateral DVT and with the severity 

of his illness I anticipate that he probably has pulmonary embolism.  We will 

continue full anticoagulation at this time.  Pulmonologist input is and ID input

appreciated.  We will add some vitamin supplementation and will evaluate if 

diuresis is needed.  Okay for patient to start diet, showing the patient was 

n.p.o. yesterday.  Plan has been discussed with the patient and also the nursing

staff and the officer on duty.





2/18: Remains on High flow oxygen 40L/100%. Prognosis poor. Prone as tolerated, 

will add some vitamins, give a dose of lasix. Updated the long term physician Dr Sy. Will discuss with Pulmonary if we should proceed with upgrade to the 

[facility]. Give a dose of lasix





2/19: Late entry for February 19 continue supportive care.  Continue to 

encourage prone position.  Wean oxygen as tolerated.  Continue remdesivir





(1) Pneumonia


Current Visit: Yes   Status: Acute   


Plan to address problem: 


Patient placed on empiric antibiotics. Will await culture results.








(2) Acute respiratory failure with hypoxia


Current Visit: Yes   Status: Acute   


Plan to address problem: 


Probably secondary to the pneumonia  with Covid-19,


Currently on oxygen. Will maintain oxygen saturation greater than 94%


Consult placed to Pulmonologist for evaluation.








(3) 2019 novel coronavirus infection


Current Visit: Yes   Status: Acute   


Plan to address problem: 


Consult placed to infectious disease for evalution








(4) Sepsis


Current Visit: Yes   Status: Acute   


Plan to address problem: 


Secondary to pneumonia.


Will continue on antibiotics and monitor labs.








(5) DVT bilateral lower extremity


Current Visit: Yes   Status: Acute   


Plan to address problem: 


On full anticoagulation








(6) Full code status


Current Visit: Yes   Status: Acute   


Plan to address problem: 


Patient is a full code.





The high probability of a clinically significant, sudden or life threatening 

deterioration of the [pulmonary, vascular] system(s) required my full and direct

attention, intervention and personal management. The aggregate critical care 

time was [35] minutes. This time is in addition to time spent performing 

reported procedures but includes the following: 





[x] Data Review and interpretation 





[x] Patient assessment and monitoring of vital signs 





[x] Documentation 





[x] Medication orders and management





History


Interval history: 


Patient seen and examined still with some shortness of breath.  Remains on high 

flow 100% oxygen saturation of 90%





Hospitalist Physical





- Physical exam


Narrative exam: 


VITAL SIGNS:  Reviewed.    


GENERAL:  The patient appears normally developed, acutely ill-appearing, 

cachectic.  On high flow oxygen.  Vital signs as documented.


HEAD:  No signs of head trauma.


EYES:  Pupils are equal.  Extraocular motions intact.  


EARS:  Hearing grossly intact.


MOUTH:  Oropharynx is normal. 


NECK:  No adenopathy, no JVD.  


CHEST:  Chest with diminished breath sounds bilaterally.  No wheezes, rales, or 

rhonchi.  


CARDIAC:  Regular rate and rhythm.  S1 and S2, without murmurs, gallops, or 

rubs.


VASCULAR:  No Edema.  Peripheral pulses normal and equal in all extremities.


ABDOMEN:  Soft, non tender and non distended.  No   rebound or guarding, and no 

masses palpated.   Bowel Sounds normal.


MUSCULOSKELETAL:  Good range of motion of all major joints. Extremities without 

clubbing, cyanosis or edema.  


NEUROLOGIC EXAM:  Alert and oriented x 3   No focal sensory or strength 

deficits.   Speech normal.  Follows commands.


PSYCHIATRIC:  Mood normal.


SKIN:  detail exam as documented in skin assessment








- Constitutional


Vitals: 


                                        











Temp Pulse Resp BP Pulse Ox


 


 97.4 F L  71   20   111/69   96 


 


 02/20/21 06:08  02/20/21 06:08  02/20/21 06:08  02/20/21 06:08  02/20/21 06:08











General appearance: Present: no acute distress, well-nourished





HEART Score





- HEART Score


Troponin: 


                                        











Troponin T  < 0.010 ng/mL (0.00-0.029)   02/15/21  23:28    














Results





- Labs


CBC & Chem 7: 


                                 02/17/21 04:14





                                 02/20/21 05:50


Labs: 


                             Laboratory Last Values











WBC  11.9 K/mm3 (4.5-11.0)  H  02/17/21  04:14    


 


RBC  4.28 M/mm3 (3.65-5.03)   02/17/21  04:14    


 


Hgb  13.3 gm/dl (11.8-15.2)   02/17/21  04:14    


 


Hct  40.1 % (35.5-45.6)   02/17/21  04:14    


 


MCV  94 fl (84-94)   02/17/21  04:14    


 


MCH  31 pg (28-32)   02/17/21  04:14    


 


MCHC  33 % (32-34)   02/17/21  04:14    


 


RDW  14.1 % (13.2-15.2)   02/17/21  04:14    


 


Plt Count  220 K/mm3 (140-440)   02/17/21  04:14    


 


Lymph % (Auto)  5.4 % (13.4-35.0)  L  02/17/21  04:14    


 


Mono % (Auto)  6.9 % (0.0-7.3)   02/17/21  04:14    


 


Eos % (Auto)  0.0 % (0.0-4.3)   02/17/21  04:14    


 


Baso % (Auto)  0.2 % (0.0-1.8)   02/17/21  04:14    


 


Lymph # (Auto)  0.6 K/mm3 (1.2-5.4)  L  02/17/21  04:14    


 


Mono # (Auto)  0.8 K/mm3 (0.0-0.8)   02/17/21  04:14    


 


Eos # (Auto)  0.0 K/mm3 (0.0-0.4)   02/17/21  04:14    


 


Baso # (Auto)  0.0 K/mm3 (0.0-0.1)   02/17/21  04:14    


 


Seg Neutrophils %  87.5 % (40.0-70.0)  H  02/17/21  04:14    


 


Seg Neutrophils #  10.4 K/mm3 (1.8-7.7)  H  02/17/21  04:14    


 


PT  14.1 Sec. (12.2-14.9)   02/17/21  04:14    


 


INR  1.11  (0.87-1.13)   02/17/21  04:14    


 


APTT  26.5 Sec. (24.2-36.6)   02/15/21  23:28    


 


D-Dimer  5476.96 ng/mlDDU (0-234)  H  02/19/21  05:26    


 


Sodium  140 mmol/L (137-145)   02/20/21  05:50    


 


Potassium  4.7 mmol/L (3.6-5.0)   02/20/21  05:50    


 


Chloride  103.4 mmol/L ()   02/20/21  05:50    


 


Carbon Dioxide  30 mmol/L (22-30)   02/20/21  05:50    


 


Anion Gap  11 mmol/L  02/20/21  05:50    


 


BUN  26 mg/dL (9-20)  H  02/20/21  05:50    


 


Creatinine  0.5 mg/dL (0.8-1.3)  L  02/20/21  05:50    


 


Estimated GFR  > 60 ml/min  02/20/21  05:50    


 


BUN/Creatinine Ratio  52 %  02/20/21  05:50    


 


Glucose  88 mg/dL ()   02/20/21  05:50    


 


Lactic Acid  1.40 mmol/L (0.7-2.0)   02/16/21  04:51    


 


Calcium  7.7 mg/dL (8.4-10.2)  L  02/20/21  05:50    


 


Ferritin  382.0 ng/mL (30.0-300.0)  H  02/19/21  05:26    


 


Total Bilirubin  0.20 mg/dL (0.1-1.2)   02/20/21  05:50    


 


AST  64 units/L (5-40)  H  02/20/21  05:50    


 


ALT  46 units/L (7-56)   02/20/21  05:50    


 


Alkaline Phosphatase  64 units/L ()   02/20/21  05:50    


 


Lactate Dehydrogenase  424 units/L ()  H  02/19/21  05:26    


 


Troponin T  < 0.010 ng/mL (0.00-0.029)   02/15/21  23:28    


 


C-Reactive Protein  3.10 mg/dL (0.00-1.30)  H  02/19/21  05:26    


 


Total Protein  5.3 g/dL (6.3-8.2)  L  02/20/21  05:50    


 


Albumin  2.8 g/dL (3.9-5)  L  02/20/21  05:50    


 


Albumin/Globulin Ratio  1.1 %  02/20/21  05:50    


 


Procalcitonin  0.46 ng/mL (<0.15)   02/15/21  23:28    


 


TSH  0.573 mlU/mL (0.270-4.200)   02/16/21  13:51    


 


Free T4  1.21 ng/dL (0.76-1.46)   02/16/21  13:51    


 


Nasal Screen MRSA (PCR)  Negative  (Negative)   02/16/21  Unknown


 


Coronavirus (PCR)  Positive  (Negative)  A  02/16/21  10:18    











Microbiology: 


Microbiology





02/15/21 23:28   Peripheral/Venous   Blood Culture - Preliminary


                            NO GROWTH AFTER 4 DAYS


02/15/21 23:19   Peripheral/Venous   Blood Culture - Preliminary


                            NO GROWTH AFTER 4 DAYS








Godwin/IV: 


                                        





Voiding Method                   Urinal











Active Medications





- Current Medications


Current Medications: 














Generic Name Dose Route Start Last Admin





  Trade Name Freq  PRN Reason Stop Dose Admin


 


Acetaminophen  650 mg  02/16/21 00:57 





  Acetaminophen 325 Mg Tab  PO  





  Q4H PRN  





  Pain MILD(1-3)/Fever >100.5/HA  


 


Ascorbic Acid  1,000 mg  02/18/21 10:00  02/20/21 09:36





  Ascorbic Acid 500 Mg Tab  PO   1,000 mg





  BID YRN   Administration


 


Cholecalciferol  5,000 unit  02/18/21 10:00  02/20/21 09:36





  Cholecalciferol (Vit D3) 5,000 Unit Tab  PO   5,000 unit





  DAILY YRN   Administration


 


Dexamethasone  6 mg  02/16/21 10:00  02/20/21 09:33





  Dexamethasone 4 Mg/Ml Vial  IV  02/25/21 10:01  6 mg





  Q24HR YRN   Administration


 


Enoxaparin Sodium  70 mg  02/16/21 11:30  02/20/21 09:33





  Enoxaparin 80 Mg/0.8 Ml Inj  SUB-Q   70 mg





  Q12HR YRN   Administration


 


REMDESIVIR 100 mg/ Sodium  250 mls @ 500 mls/hr  02/18/21 21:00  02/19/21 21:35





  Chloride  IV  02/21/21 21:29  500 mls/hr





  Q24HR@2100 YRN   Administration


 


Magnesium Hydroxide  30 ml  02/16/21 00:57 





  Magnesium Hydroxide (Mom) Oral Liqd Udc  PO  





  Q4H PRN  





  Constipation  


 


Morphine Sulfate  2 mg  02/16/21 00:57 





  Morphine 2 Mg/1 Ml Inj  IV  





  Q4H PRN  





  Pain, Moderate (4-6)  


 


Ondansetron HCl  4 mg  02/16/21 00:57 





  Ondansetron 4 Mg/2 Ml Inj  IV  





  Q8H PRN  





  Nausea And Vomiting  


 


Sodium Chloride  10 ml  02/16/21 10:00  02/20/21 09:35





  Sodium Chloride 0.9% 10 Ml Flush Syringe  IV   10 ml





  BID YRN   Administration


 


Sodium Chloride  10 ml  02/16/21 00:57 





  Sodium Chloride 0.9% 10 Ml Flush Syringe  IV  





  PRN PRN  





  LINE FLUSH  


 


Sodium Chloride  50 ml  02/17/21 11:30  02/19/21 22:16





  Sodium Chloride 0.9% 50 Ml Ivpb  IV  02/21/21 21:01  50 ml





  Q24HR@2100 YRN   Administration


 


Zinc Sulfate  220 mg  02/18/21 10:00  02/20/21 09:36





  Zinc Sulfate 220 Mg Cap  PO   220 mg





  QDAY YRN   Administration














Nutrition/Malnutrition Assess





- Dietary Evaluation


Nutrition/Malnutrition Findings: 


                                        





Nutrition Notes                                            Start:  02/16/21 

12:36


Freq:                                                      Status: Active       

 


Protocol:                                                                       

 


 Document     02/18/21 13:19  AT  (Rec: 02/18/21 13:28  AT  [id number])


 Co-Sign      02/18/21 13:19  MK


 Nutrition Notes


     Initial or Follow up                        Reassessment


     Current Diagnosis                           Sepsis,Respiratory Failure


     Other Pertinent Diagnosis                   COVID(+), SOB, Pneumonia


     Current Diet                                Regular Diet


     Labs/Tests                                  Na 136


                                                 BUN 25


                                                 Cr 0.6


     Pertinent Medications                       Zinc Sulfate


                                                 Vit D3


                                                 Vit C


                                                 Lasix


                                                 Decadron


     Height                                      5 ft 9 in


     Weight                                      68.039 kg


     Ideal Body Weight (kg)                      72.72


     BMI                                         22.1


     Intake Prior to Admission                   Poor


     Weight Status                               Appropriate


     Subjective/Other Information                Follow up for intakes, ONS


                                                 tolerance, BM. Was unable to


                                                 reach pt by the phone x2. Per


                                                 chart, pt is consuming 100% of


                                                 meals and ONS. Per RN, the pt


                                                 has no wounds, but has a


                                                 Vikash Score of 16. Per chart,


                                                 pt had a normal BM yesterday.


     Percent of energy/protein needs met:        100%/100%


     Burn                                        Absent


     Trauma                                      Absent


     GI Symptoms                                 None


     Food Allergy                                No


     Current % PO                                Good (%)


     Minimum of two criteria                     No


     Reduced  Strength                       Measurably Reduced (severe)


     #2


      Nutrition Diagnosis                        No nutrition diagnosis at this


                                                 time


     #1


      Nutrition Diagnosis                        Predicted suboptimal energy


                                                 intake


      As Evidenced by Signs and Symptoms         pt consuming 100% of meals and


                                                 ONS x2 days


      Diagnosis Progress(for reassessment        Resolved


       documentation)                            


     Is patient on ventilator?                   No


     Is Patient Ambulatory and/or Out of Bed     No


     REE-([facility]-confined to bed)      [phone]


     Calculation Used for Recommendations        [facility]


     Additional Notes                            PRO needs: 68-82g (1-1.2 g/kg)


                                                 Fluid needs: 1 mL/kcal or per


                                                 MD


 Nutrition Intervention


     Change Diet Order:                          Continue Regular Diet


     Add Supplement/Snack (indicate name/kcal    Ensure Enlive BID


      /protein )                                 


     Provides kCal:                              700


     Provides Protein (gm)                       40


     Goal #1                                     Maintain weight


     Goal #2                                     Meet at least 75% of estimated


                                                 energy and protein needs via


                                                 diet and ONS


     Anticipated Discharge Needs:                Regular diet


     Follow-Up By:                               02/25/21


     Additional Comments                         F/U for stable intakes

## 2021-02-20 NOTE — PROGRESS NOTE
Assessment and Plan





- Patient Problems


(1) COVID-19


Current Visit: Yes   Status: Acute   





(2) Acute respiratory failure with hypoxia


Current Visit: Yes   Status: Acute   





(3) Pneumonia


Current Visit: Yes   Status: Acute   





Subjective


Interval history: 





awake appropriate





Objective


                               Vital Signs - 12hr











  02/20/21 02/20/21 02/20/21





  03:59 06:08 11:16


 


Temperature  97.4 F L 97.4 F L


 


Pulse Rate  71 87


 


Respiratory  20 20





Rate   


 


Blood Pressure  111/69 109/72


 


O2 Sat by Pulse 92 96 93





Oximetry   











Constitutional: no acute distress, alert


Eyes: non-icteric


ENT: oropharynx moist


Neck: supple


Ascultation: Bilateral: diminished breath sounds


Cardiovascular: regular rate and rhythm


Gastrointestinal: normoactive bowel sounds, soft, non-tender


Integumentary: normal


Extremities: no cyanosis


Neurologic: normal mental status


CBC and BMP: 


                                 02/17/21 04:14





                                 02/20/21 05:50


ABG, PT/INR, D-dimer: 


PT/INR, D-dimer











PT  14.1 Sec. (12.2-14.9)   02/17/21  04:14    


 


INR  1.11  (0.87-1.13)   02/17/21  04:14    


 


D-Dimer  5476.96 ng/mlDDU (0-234)  H  02/19/21  05:26    








Abnormal lab findings: 


                                  Abnormal Labs











  02/15/21 02/15/21 02/15/21





  23:28 23:28 23:28


 


WBC   


 


Lymph % (Auto)  4.1 L  


 


Mono % (Auto)  7.8 H  


 


Lymph # (Auto)  0.3 L  


 


Seg Neutrophils %  86.9 H  


 


Seg Neutrophils #   


 


D-Dimer   6514.44 H 


 


Sodium    135 L


 


Chloride    95.2 L


 


Carbon Dioxide   


 


BUN   


 


Creatinine    0.7 L


 


Glucose   


 


Calcium   


 


Ferritin   


 


AST   


 


Lactate Dehydrogenase   


 


C-Reactive Protein   


 


Total Protein   


 


Albumin    3.0 L


 


Coronavirus (PCR)   














  02/15/21 02/15/21 02/16/21





  23:28 23:28 10:18


 


WBC   


 


Lymph % (Auto)   


 


Mono % (Auto)   


 


Lymph # (Auto)   


 


Seg Neutrophils %   


 


Seg Neutrophils #   


 


D-Dimer   


 


Sodium   


 


Chloride   


 


Carbon Dioxide   


 


BUN   


 


Creatinine   


 


Glucose   


 


Calcium   


 


Ferritin  737.7 H  


 


AST   


 


Lactate Dehydrogenase   636 H 


 


C-Reactive Protein   17.30 H 


 


Total Protein   


 


Albumin   


 


Coronavirus (PCR)    Positive A














  02/17/21 02/17/21 02/18/21





  04:14 04:14 05:19


 


WBC  11.9 H  


 


Lymph % (Auto)  5.4 L  


 


Mono % (Auto)   


 


Lymph # (Auto)  0.6 L  


 


Seg Neutrophils %  87.5 H  


 


Seg Neutrophils #  10.4 H  


 


D-Dimer   


 


Sodium    136 L


 


Chloride   


 


Carbon Dioxide   


 


BUN   27 H  25 H


 


Creatinine   0.7 L  0.6 L


 


Glucose   138 H 


 


Calcium   8.3 L  7.8 L


 


Ferritin   


 


AST   


 


Lactate Dehydrogenase   


 


C-Reactive Protein   


 


Total Protein    5.3 L


 


Albumin    2.9 L


 


Coronavirus (PCR)   














  02/19/21 02/19/21 02/19/21





  05:26 05:26 05:26


 


WBC   


 


Lymph % (Auto)   


 


Mono % (Auto)   


 


Lymph # (Auto)   


 


Seg Neutrophils %   


 


Seg Neutrophils #   


 


D-Dimer   5476.96 H 


 


Sodium   


 


Chloride   


 


Carbon Dioxide  31 H  


 


BUN  22 H  


 


Creatinine  0.5 L  


 


Glucose  117 H  


 


Calcium  7.9 L  


 


Ferritin    382.0 H


 


AST   


 


Lactate Dehydrogenase   


 


C-Reactive Protein   


 


Total Protein  5.8 L  


 


Albumin  2.7 L  


 


Coronavirus (PCR)   














  02/19/21 02/20/21





  05:26 05:50


 


WBC  


 


Lymph % (Auto)  


 


Mono % (Auto)  


 


Lymph # (Auto)  


 


Seg Neutrophils %  


 


Seg Neutrophils #  


 


D-Dimer  


 


Sodium  


 


Chloride  


 


Carbon Dioxide  


 


BUN   26 H


 


Creatinine   0.5 L


 


Glucose  


 


Calcium   7.7 L


 


Ferritin  


 


AST   64 H


 


Lactate Dehydrogenase  424 H 


 


C-Reactive Protein  3.10 H 


 


Total Protein   5.3 L


 


Albumin   2.8 L


 


Coronavirus (PCR)

## 2021-02-21 LAB — CRP SERPL-MCNC: 3 MG/DL (ref 0–1.3)

## 2021-02-21 RX ADMIN — CHOLECALCIFEROL TAB 125 MCG (5000 UNIT) SCH UNIT: 125 TAB at 10:17

## 2021-02-21 RX ADMIN — OXYCODONE HYDROCHLORIDE AND ACETAMINOPHEN SCH MG: 500 TABLET ORAL at 21:44

## 2021-02-21 RX ADMIN — Medication SCH MG: at 10:16

## 2021-02-21 RX ADMIN — Medication SCH ML: at 21:45

## 2021-02-21 RX ADMIN — REMDESIVIR SCH MLS/HR: 100 INJECTION, POWDER, LYOPHILIZED, FOR SOLUTION INTRAVENOUS at 21:44

## 2021-02-21 RX ADMIN — Medication SCH ML: at 10:18

## 2021-02-21 RX ADMIN — ENOXAPARIN SODIUM SCH MG: 100 INJECTION SUBCUTANEOUS at 21:44

## 2021-02-21 RX ADMIN — ENOXAPARIN SODIUM SCH MG: 100 INJECTION SUBCUTANEOUS at 10:15

## 2021-02-21 RX ADMIN — OXYCODONE HYDROCHLORIDE AND ACETAMINOPHEN SCH MG: 500 TABLET ORAL at 10:17

## 2021-02-21 RX ADMIN — SODIUM CHLORIDE SCH ML: 9 INJECTION, SOLUTION INTRAVENOUS at 22:45

## 2021-02-21 NOTE — PROGRESS NOTE
Assessment and Plan


Assessment and plan: 


52 year old white male with no significant past medical problems currently 

incarcerated at [facility] seen in the emergency room with complaints 

of Cough, shortness of breath.


He denies any fever, no chills, no chest pain, no nausea ,no vomiting, no 

diarrhea.





Upon arrival in the emergency room , he was hypoxic with oxygen saturation of 

70%.





Work up in the ER reveals bilateral infiltrate, elevated D-dimer.





He has been started on empiric antibiotics and IV steroid.


He is also to be ruled out for COVID-19.





2/17: Patient continues on high flow oxygen.  Coronavirus testing came back 

positive.  Patient is on steroids awaiting ID for remdesivir treatment 

initiation.  He is also noted to have acute bilateral DVT and with the severity 

of his illness I anticipate that he probably has pulmonary embolism.  We will 

continue full anticoagulation at this time.  Pulmonologist input is and ID input

appreciated.  We will add some vitamin supplementation and will evaluate if 

diuresis is needed.  Okay for patient to start diet, showing the patient was 

n.p.o. yesterday.  Plan has been discussed with the patient and also the nursing

staff and the officer on duty.





2/18: Remains on High flow oxygen 40L/100%. Prognosis poor. Prone as tolerated, 

will add some vitamins, give a dose of lasix. Updated the long-term physician Dr Sy. Will discuss with Pulmonary if we should proceed with upgrade to the 

[facility]. Give a dose of lasix





2/19: Late entry for February 19 continue supportive care.  Continue to 

encourage prone position.  Wean oxygen as tolerated.  Continue remdesivir





2/21: Patient seen and examined clinically stable.  Still on high flow down to 

90%.  Saturating 91%.  Continue to encourage prone position continue steroids 

and remdesivir wean as tolerated. Advised again of diagnosis. Continue to use IS





(1) Pneumonia


Current Visit: Yes   Status: Acute   


Plan to address problem: 


Patient placed on empiric antibiotics. Will await culture results.








(2) Acute respiratory failure with hypoxia


Current Visit: Yes   Status: Acute   


Plan to address problem: 


Probably secondary to the pneumonia  with Covid-19,


Currently on oxygen. Will maintain oxygen saturation greater than 94%


Consult placed to Pulmonologist for evaluation.








(3) 2019 novel coronavirus infection


Current Visit: Yes   Status: Acute   


Plan to address problem: 


Consult placed to infectious disease for evalution








(4) Sepsis


Current Visit: Yes   Status: Acute   


Plan to address problem: 


Secondary to pneumonia.


Will continue on antibiotics and monitor labs.








(5) DVT bilateral lower extremity


Current Visit: Yes   Status: Acute   


Plan to address problem: 


On full anticoagulation








(6) Full code status


Current Visit: Yes   Status: Acute   


Plan to address problem: 


Patient is a full code.





The high probability of a clinically significant, sudden or life threatening 

deterioration of the [pulmonary, vascular] system(s) required my full and direct

attention, intervention and personal management. The aggregate critical care 

time was [35] minutes. This time is in addition to time spent performing 

reported procedures but includes the following: 





[x] Data Review and interpretation 





[x] Patient assessment and monitoring of vital signs 





[x] Documentation 





[x] Medication orders and management





History


Interval history: 


Patient seen and examined still with some shortness of breath.  Remains on high 

flow 100% oxygen saturation of 90%





Hospitalist Physical





- Physical exam


Narrative exam: 


VITAL SIGNS:  Reviewed.    


GENERAL:  The patient appears normally developed, acutely ill-appearing, 

cachectic.  On high flow oxygen.  Vital signs as documented.


HEAD:  No signs of head trauma.


EYES:  Pupils are equal.  Extraocular motions intact.  


EARS:  Hearing grossly intact.


MOUTH:  Oropharynx is normal. 


NECK:  No adenopathy, no JVD.  


CHEST:  Chest with diminished breath sounds bilaterally.  No wheezes, rales, or 

rhonchi.  


CARDIAC:  Regular rate and rhythm.  S1 and S2, without murmurs, gallops, or 

rubs.


VASCULAR:  No Edema.  Peripheral pulses normal and equal in all extremities.


ABDOMEN:  Soft, non tender and non distended.  No   rebound or guarding, and no 

masses palpated.   Bowel Sounds normal.


MUSCULOSKELETAL:  Good range of motion of all major joints. Extremities without 

clubbing, cyanosis or edema.  


NEUROLOGIC EXAM:  Alert and oriented x 3   No focal sensory or strength 

deficits.   Speech normal.  Follows commands.


PSYCHIATRIC:  Mood normal.


SKIN:  detail exam as documented in skin assessment








- Constitutional


Vitals: 


                                        











Temp Pulse Resp BP Pulse Ox


 


 98.2 F   87   22   113/64   91 


 


 02/21/21 05:30  02/21/21 05:30  02/21/21 05:30  02/21/21 05:30  02/21/21 08:26











General appearance: Present: no acute distress, well-nourished





HEART Score





- HEART Score


Troponin: 


                                        











Troponin T  < 0.010 ng/mL (0.00-0.029)   02/15/21  23:28    














Results





- Labs


CBC & Chem 7: 


                                 02/17/21 04:14





                                 02/20/21 05:50


Labs: 


                             Laboratory Last Values











WBC  11.9 K/mm3 (4.5-11.0)  H  02/17/21  04:14    


 


RBC  4.28 M/mm3 (3.65-5.03)   02/17/21  04:14    


 


Hgb  13.3 gm/dl (11.8-15.2)   02/17/21  04:14    


 


Hct  40.1 % (35.5-45.6)   02/17/21  04:14    


 


MCV  94 fl (84-94)   02/17/21  04:14    


 


MCH  31 pg (28-32)   02/17/21  04:14    


 


MCHC  33 % (32-34)   02/17/21  04:14    


 


RDW  14.1 % (13.2-15.2)   02/17/21  04:14    


 


Plt Count  220 K/mm3 (140-440)   02/17/21  04:14    


 


Lymph % (Auto)  5.4 % (13.4-35.0)  L  02/17/21  04:14    


 


Mono % (Auto)  6.9 % (0.0-7.3)   02/17/21  04:14    


 


Eos % (Auto)  0.0 % (0.0-4.3)   02/17/21  04:14    


 


Baso % (Auto)  0.2 % (0.0-1.8)   02/17/21  04:14    


 


Lymph # (Auto)  0.6 K/mm3 (1.2-5.4)  L  02/17/21  04:14    


 


Mono # (Auto)  0.8 K/mm3 (0.0-0.8)   02/17/21  04:14    


 


Eos # (Auto)  0.0 K/mm3 (0.0-0.4)   02/17/21  04:14    


 


Baso # (Auto)  0.0 K/mm3 (0.0-0.1)   02/17/21  04:14    


 


Seg Neutrophils %  87.5 % (40.0-70.0)  H  02/17/21  04:14    


 


Seg Neutrophils #  10.4 K/mm3 (1.8-7.7)  H  02/17/21  04:14    


 


PT  14.1 Sec. (12.2-14.9)   02/17/21  04:14    


 


INR  1.11  (0.87-1.13)   02/17/21  04:14    


 


APTT  26.5 Sec. (24.2-36.6)   02/15/21  23:28    


 


D-Dimer  3581.74 ng/mlDDU (0-234)  H  02/21/21  05:41    


 


Sodium  140 mmol/L (137-145)   02/20/21  05:50    


 


Potassium  4.7 mmol/L (3.6-5.0)   02/20/21  05:50    


 


Chloride  103.4 mmol/L ()   02/20/21  05:50    


 


Carbon Dioxide  30 mmol/L (22-30)   02/20/21  05:50    


 


Anion Gap  11 mmol/L  02/20/21  05:50    


 


BUN  26 mg/dL (9-20)  H  02/20/21  05:50    


 


Creatinine  0.5 mg/dL (0.8-1.3)  L  02/20/21  05:50    


 


Estimated GFR  > 60 ml/min  02/20/21  05:50    


 


BUN/Creatinine Ratio  52 %  02/20/21  05:50    


 


Glucose  88 mg/dL ()   02/20/21  05:50    


 


Lactic Acid  1.40 mmol/L (0.7-2.0)   02/16/21  04:51    


 


Calcium  7.7 mg/dL (8.4-10.2)  L  02/20/21  05:50    


 


Ferritin  357.0 ng/mL (30.0-300.0)  H  02/21/21  05:41    


 


Total Bilirubin  0.20 mg/dL (0.1-1.2)   02/20/21  05:50    


 


AST  64 units/L (5-40)  H  02/20/21  05:50    


 


ALT  46 units/L (7-56)   02/20/21  05:50    


 


Alkaline Phosphatase  64 units/L ()   02/20/21  05:50    


 


Lactate Dehydrogenase  478 units/L ()  H  02/21/21  05:41    


 


Troponin T  < 0.010 ng/mL (0.00-0.029)   02/15/21  23:28    


 


C-Reactive Protein  3.00 mg/dL (0.00-1.30)  H  02/21/21  05:41    


 


Total Protein  5.3 g/dL (6.3-8.2)  L  02/20/21  05:50    


 


Albumin  2.8 g/dL (3.9-5)  L  02/20/21  05:50    


 


Albumin/Globulin Ratio  1.1 %  02/20/21  05:50    


 


Procalcitonin  0.46 ng/mL (<0.15)   02/15/21  23:28    


 


TSH  0.573 mlU/mL (0.270-4.200)   02/16/21  13:51    


 


Free T4  1.21 ng/dL (0.76-1.46)   02/16/21  13:51    


 


Nasal Screen MRSA (PCR)  Negative  (Negative)   02/16/21  Unknown


 


Coronavirus (PCR)  Positive  (Negative)  A  02/16/21  10:18    











Microbiology: 


Microbiology





02/15/21 23:28   Peripheral/Venous   Blood Culture - Final


                            NO GROWTH AFTER 5 DAYS


02/15/21 23:19   Peripheral/Venous   Blood Culture - Final


                            NO GROWTH AFTER 5 DAYS








Godwin/IV: 


                                        





Voiding Method                   Urinal











Active Medications





- Current Medications


Current Medications: 














Generic Name Dose Route Start Last Admin





  Trade Name Freq  PRN Reason Stop Dose Admin


 


Acetaminophen  650 mg  02/16/21 00:57 





  Acetaminophen 325 Mg Tab  PO  





  Q4H PRN  





  Pain MILD(1-3)/Fever >100.5/HA  


 


Ascorbic Acid  1,000 mg  02/18/21 10:00  02/20/21 21:39





  Ascorbic Acid 500 Mg Tab  PO   1,000 mg





  BID YRN   Administration


 


Cholecalciferol  5,000 unit  02/18/21 10:00  02/20/21 09:36





  Cholecalciferol (Vit D3) 5,000 Unit Tab  PO   5,000 unit





  DAILY YRN   Administration


 


Dexamethasone  6 mg  02/16/21 10:00  02/20/21 09:33





  Dexamethasone 4 Mg/Ml Vial  IV  02/25/21 10:01  6 mg





  Q24HR YRN   Administration


 


Enoxaparin Sodium  70 mg  02/16/21 11:30  02/20/21 21:38





  Enoxaparin 80 Mg/0.8 Ml Inj  SUB-Q   70 mg





  Q12HR YRN   Administration


 


REMDESIVIR 100 mg/ Sodium  250 mls @ 500 mls/hr  02/18/21 21:00  02/20/21 21:38





  Chloride  IV  02/21/21 21:29  500 mls/hr





  Q24HR@2100 YRN   Administration


 


Magnesium Hydroxide  30 ml  02/16/21 00:57 





  Magnesium Hydroxide (Mom) Oral Liqd Udc  PO  





  Q4H PRN  





  Constipation  


 


Morphine Sulfate  2 mg  02/16/21 00:57 





  Morphine 2 Mg/1 Ml Inj  IV  





  Q4H PRN  





  Pain, Moderate (4-6)  


 


Ondansetron HCl  4 mg  02/16/21 00:57 





  Ondansetron 4 Mg/2 Ml Inj  IV  





  Q8H PRN  





  Nausea And Vomiting  


 


Sodium Chloride  10 ml  02/16/21 10:00  02/20/21 21:38





  Sodium Chloride 0.9% 10 Ml Flush Syringe  IV   10 ml





  BID YRN   Administration


 


Sodium Chloride  10 ml  02/16/21 00:57 





  Sodium Chloride 0.9% 10 Ml Flush Syringe  IV  





  PRN PRN  





  LINE FLUSH  


 


Sodium Chloride  50 ml  02/17/21 11:30  02/20/21 21:40





  Sodium Chloride 0.9% 50 Ml Ivpb  IV  02/21/21 21:01  50 ml





  Q24HR@2100 YRN   Administration


 


Zinc Sulfate  220 mg  02/18/21 10:00  02/20/21 09:36





  Zinc Sulfate 220 Mg Cap  PO   220 mg





  QDAY YRN   Administration














Nutrition/Malnutrition Assess





- Dietary Evaluation


Nutrition/Malnutrition Findings: 


                                        





Nutrition Notes                                            Start:  02/16/21 

12:36


Freq:                                                      Status: Active       

 


Protocol:                                                                       

 


 Document     02/18/21 13:19  AT  (Rec: 02/18/21 13:28  AT  [id number])


 Co-Sign      02/18/21 13:19  


 Nutrition Notes


     Initial or Follow up                        Reassessment


     Current Diagnosis                           Sepsis,Respiratory Failure


     Other Pertinent Diagnosis                   COVID(+), SOB, Pneumonia


     Current Diet                                Regular Diet


     Labs/Tests                                  Na 136


                                                 BUN 25


                                                 Cr 0.6


     Pertinent Medications                       Zinc Sulfate


                                                 Vit D3


                                                 Vit C


                                                 Lasix


                                                 Decadron


     Height                                      5 ft 9 in


     Weight                                      68.039 kg


     Ideal Body Weight (kg)                      72.72


     BMI                                         22.1


     Intake Prior to Admission                   Poor


     Weight Status                               Appropriate


     Subjective/Other Information                Follow up for intakes, ONS


                                                 tolerance, BM. Was unable to


                                                 reach pt by the phone x2. Per


                                                 chart, pt is consuming 100% of


                                                 meals and ONS. Per RN, the pt


                                                 has no wounds, but has a


                                                 Vikash Score of 16. Per chart,


                                                 pt had a normal BM yesterday.


     Percent of energy/protein needs met:        100%/100%


     Burn                                        Absent


     Trauma                                      Absent


     GI Symptoms                                 None


     Food Allergy                                No


     Current % PO                                Good (%)


     Minimum of two criteria                     No


     Reduced  Strength                       Measurably Reduced (severe)


     #2


      Nutrition Diagnosis                        No nutrition diagnosis at this


                                                 time


     #1


      Nutrition Diagnosis                        Predicted suboptimal energy


                                                 intake


      As Evidenced by Signs and Symptoms         pt consuming 100% of meals and


                                                 ONS x2 days


      Diagnosis Progress(for reassessment        Resolved


       documentation)                            


     Is patient on ventilator?                   No


     Is Patient Ambulatory and/or Out of Bed     No


     REE-(Dent-St. Jeor-confined to bed)      8311.082


     Calculation Used for Recommendations        [facility]


     Additional Notes                            PRO needs: 68-82g (1-1.2 g/kg)


                                                 Fluid needs: 1 mL/kcal or per


                                                 MD


 Nutrition Intervention


     Change Diet Order:                          Continue Regular Diet


     Add Supplement/Snack (indicate name/kcal    Ensure Enlive BID


      /protein )                                 


     Provides kCal:                              700


     Provides Protein (gm)                       40


     Goal #1                                     Maintain weight


     Goal #2                                     Meet at least 75% of estimated


                                                 energy and protein needs via


                                                 diet and ONS


     Anticipated Discharge Needs:                Regular diet


     Follow-Up By:                               02/25/21


     Additional Comments                         F/U for stable intakes

## 2021-02-21 NOTE — PROGRESS NOTE
Assessment and Plan





- Patient Problems


(1) COVID-19


Current Visit: Yes   Status: Acute   





(2) Acute respiratory failure with hypoxia


Current Visit: Yes   Status: Acute   





(3) Pneumonia


Current Visit: Yes   Status: Acute   





Subjective


Interval history: 





no new complaints





Objective


                               Vital Signs - 12hr











  02/21/21 02/21/21 02/21/21





  02:42 05:30 08:26


 


Temperature  98.2 F 


 


Pulse Rate  87 


 


Respiratory  22 





Rate   


 


Blood Pressure  113/64 





[Left]   


 


O2 Sat by Pulse 96 97 91





Oximetry   











Constitutional: no acute distress, alert


Eyes: non-icteric


ENT: oropharynx moist


Neck: supple


Ascultation: Bilateral: diminished breath sounds


Cardiovascular: regular rate and rhythm


Gastrointestinal: normoactive bowel sounds, soft, non-tender


Integumentary: normal


Extremities: no cyanosis


Neurologic: normal mental status


CBC and BMP: 


                                 02/17/21 04:14





                                 02/20/21 05:50


ABG, PT/INR, D-dimer: 


PT/INR, D-dimer











PT  14.1 Sec. (12.2-14.9)   02/17/21  04:14    


 


INR  1.11  (0.87-1.13)   02/17/21  04:14    


 


D-Dimer  3581.74 ng/mlDDU (0-234)  H  02/21/21  05:41    








Abnormal lab findings: 


                                  Abnormal Labs











  02/15/21 02/15/21 02/15/21





  23:28 23:28 23:28


 


WBC   


 


Lymph % (Auto)  4.1 L  


 


Mono % (Auto)  7.8 H  


 


Lymph # (Auto)  0.3 L  


 


Seg Neutrophils %  86.9 H  


 


Seg Neutrophils #   


 


D-Dimer   6514.44 H 


 


Sodium    135 L


 


Chloride    95.2 L


 


Carbon Dioxide   


 


BUN   


 


Creatinine    0.7 L


 


Glucose   


 


Calcium   


 


Ferritin   


 


AST   


 


Lactate Dehydrogenase   


 


C-Reactive Protein   


 


Total Protein   


 


Albumin    3.0 L


 


Coronavirus (PCR)   














  02/15/21 02/15/21 02/16/21





  23:28 23:28 10:18


 


WBC   


 


Lymph % (Auto)   


 


Mono % (Auto)   


 


Lymph # (Auto)   


 


Seg Neutrophils %   


 


Seg Neutrophils #   


 


D-Dimer   


 


Sodium   


 


Chloride   


 


Carbon Dioxide   


 


BUN   


 


Creatinine   


 


Glucose   


 


Calcium   


 


Ferritin  737.7 H  


 


AST   


 


Lactate Dehydrogenase   636 H 


 


C-Reactive Protein   17.30 H 


 


Total Protein   


 


Albumin   


 


Coronavirus (PCR)    Positive A














  02/17/21 02/17/21 02/18/21





  04:14 04:14 05:19


 


WBC  11.9 H  


 


Lymph % (Auto)  5.4 L  


 


Mono % (Auto)   


 


Lymph # (Auto)  0.6 L  


 


Seg Neutrophils %  87.5 H  


 


Seg Neutrophils #  10.4 H  


 


D-Dimer   


 


Sodium    136 L


 


Chloride   


 


Carbon Dioxide   


 


BUN   27 H  25 H


 


Creatinine   0.7 L  0.6 L


 


Glucose   138 H 


 


Calcium   8.3 L  7.8 L


 


Ferritin   


 


AST   


 


Lactate Dehydrogenase   


 


C-Reactive Protein   


 


Total Protein    5.3 L


 


Albumin    2.9 L


 


Coronavirus (PCR)   














  02/19/21 02/19/21 02/19/21





  05:26 05:26 05:26


 


WBC   


 


Lymph % (Auto)   


 


Mono % (Auto)   


 


Lymph # (Auto)   


 


Seg Neutrophils %   


 


Seg Neutrophils #   


 


D-Dimer   5476.96 H 


 


Sodium   


 


Chloride   


 


Carbon Dioxide  31 H  


 


BUN  22 H  


 


Creatinine  0.5 L  


 


Glucose  117 H  


 


Calcium  7.9 L  


 


Ferritin    382.0 H


 


AST   


 


Lactate Dehydrogenase   


 


C-Reactive Protein   


 


Total Protein  5.8 L  


 


Albumin  2.7 L  


 


Coronavirus (PCR)   














  02/19/21 02/20/21 02/21/21





  05:26 05:50 05:41


 


WBC   


 


Lymph % (Auto)   


 


Mono % (Auto)   


 


Lymph # (Auto)   


 


Seg Neutrophils %   


 


Seg Neutrophils #   


 


D-Dimer    3581.74 H


 


Sodium   


 


Chloride   


 


Carbon Dioxide   


 


BUN   26 H 


 


Creatinine   0.5 L 


 


Glucose   


 


Calcium   7.7 L 


 


Ferritin   


 


AST   64 H 


 


Lactate Dehydrogenase  424 H  


 


C-Reactive Protein  3.10 H  


 


Total Protein   5.3 L 


 


Albumin   2.8 L 


 


Coronavirus (PCR)   














  02/21/21 02/21/21





  05:41 05:41


 


WBC  


 


Lymph % (Auto)  


 


Mono % (Auto)  


 


Lymph # (Auto)  


 


Seg Neutrophils %  


 


Seg Neutrophils #  


 


D-Dimer  


 


Sodium  


 


Chloride  


 


Carbon Dioxide  


 


BUN  


 


Creatinine  


 


Glucose  


 


Calcium  


 


Ferritin  357.0 H 


 


AST  


 


Lactate Dehydrogenase   478 H


 


C-Reactive Protein   3.00 H


 


Total Protein  


 


Albumin  


 


Coronavirus (PCR)

## 2021-02-22 RX ADMIN — OXYCODONE HYDROCHLORIDE AND ACETAMINOPHEN SCH MG: 500 TABLET ORAL at 22:03

## 2021-02-22 RX ADMIN — Medication SCH MG: at 11:13

## 2021-02-22 RX ADMIN — OXYCODONE HYDROCHLORIDE AND ACETAMINOPHEN SCH MG: 500 TABLET ORAL at 11:13

## 2021-02-22 RX ADMIN — CHOLECALCIFEROL TAB 125 MCG (5000 UNIT) SCH UNIT: 125 TAB at 11:13

## 2021-02-22 RX ADMIN — ENOXAPARIN SODIUM SCH MG: 100 INJECTION SUBCUTANEOUS at 11:12

## 2021-02-22 RX ADMIN — ENOXAPARIN SODIUM SCH MG: 100 INJECTION SUBCUTANEOUS at 22:03

## 2021-02-22 RX ADMIN — Medication SCH ML: at 22:03

## 2021-02-22 RX ADMIN — Medication SCH ML: at 11:14

## 2021-02-22 RX ADMIN — MORPHINE SULFATE PRN MG: 2 INJECTION, SOLUTION INTRAMUSCULAR; INTRAVENOUS at 22:14

## 2021-02-22 NOTE — PROGRESS NOTE
Assessment and Plan


Assessment and plan: 


52 year old white male with no significant past medical problems currently 

incarcerated at [facility] seen in the emergency room with complaints 

of Cough, shortness of breath.


He denies any fever, no chills, no chest pain, no nausea ,no vomiting, no 

diarrhea.





Upon arrival in the emergency room , he was hypoxic with oxygen saturation of 

70%.





Work up in the ER reveals bilateral infiltrate, elevated D-dimer.





He has been started on empiric antibiotics and IV steroid.


He is also to be ruled out for COVID-19.





2/17: Patient continues on high flow oxygen.  Coronavirus testing came back 

positive.  Patient is on steroids awaiting ID for remdesivir treatment 

initiation.  He is also noted to have acute bilateral DVT and with the severity 

of his illness I anticipate that he probably has pulmonary embolism.  We will 

continue full anticoagulation at this time.  Pulmonologist input is and ID input

appreciated.  We will add some vitamin supplementation and will evaluate if 

diuresis is needed.  Okay for patient to start diet, showing the patient was 

n.p.o. yesterday.  Plan has been discussed with the patient and also the nursing

staff and the officer on duty.





2/18: Remains on High flow oxygen 40L/100%. Prognosis poor. Prone as tolerated, 

will add some vitamins, give a dose of lasix. Updated the [facility] physician Dr Sy. Will discuss with Pulmonary if we should proceed with upgrade to the 

[facility]. Give a dose of lasix





2/19: Late entry for February 19 continue supportive care.  Continue to 

encourage prone position.  Wean oxygen as tolerated.  Continue remdesivir





2/21: Patient seen and examined clinically stable.  Still on high flow down to 

90%.  Saturating 91%.  Continue to encourage prone position continue steroids 

and remdesivir wean as tolerated. Advised again of diagnosis. Continue to use IS





2/22: Continue supportive care, wean as tolerated, Prone as tolerated, continue 

steroids and anticoagulation per hospital policy





(1) Pneumonia


Current Visit: Yes   Status: Acute   


Plan to address problem: 


Patient placed on empiric antibiotics. Will await culture results.








(2) Acute respiratory failure with hypoxia


Current Visit: Yes   Status: Acute   


Plan to address problem: 


Probably secondary to the pneumonia  with Covid-19,


Currently on oxygen. Will maintain oxygen saturation greater than 94%


Consult placed to Pulmonologist for evaluation.








(3) 2019 novel coronavirus infection


Current Visit: Yes   Status: Acute   


Plan to address problem: 


Consult placed to infectious disease for evalution








(4) Sepsis


Current Visit: Yes   Status: Acute   


Plan to address problem: 


Secondary to pneumonia.


Will continue on antibiotics and monitor labs.








(5) DVT bilateral lower extremity


Current Visit: Yes   Status: Acute   


Plan to address problem: 


On full anticoagulation








(6) Full code status


Current Visit: Yes   Status: Acute   


Plan to address problem: 


Patient is a full code.





The high probability of a clinically significant, sudden or life threatening 

deterioration of the [pulmonary, vascular] system(s) required my full and direct

attention, intervention and personal management. The aggregate critical care 

time was [35] minutes. This time is in addition to time spent performing 

reported procedures but includes the following: 





[x] Data Review and interpretation 





[x] Patient assessment and monitoring of vital signs 





[x] Documentation 





[x] Medication orders and management





History


Interval history: 


Patient seen and examined still with some shortness of breath.  Remains on high 

flow 100% oxygen saturation of 90%





Hospitalist Physical





- Physical exam


Narrative exam: 


VITAL SIGNS:  Reviewed.    


GENERAL:  The patient appears normally developed, acutely ill-appearing, 

cachectic.  On high flow oxygen.  Vital signs as documented.


HEAD:  No signs of head trauma.


EYES:  Pupils are equal.  Extraocular motions intact.  


EARS:  Hearing grossly intact.


MOUTH:  Oropharynx is normal. 


NECK:  No adenopathy, no JVD.  


CHEST:  Chest with diminished breath sounds bilaterally.  No wheezes, rales, or 

rhonchi.  


CARDIAC:  Regular rate and rhythm.  S1 and S2, without murmurs, gallops, or 

rubs.


VASCULAR:  No Edema.  Peripheral pulses normal and equal in all extremities.


ABDOMEN:  Soft, non tender and non distended.  No   rebound or guarding, and no 

masses palpated.   Bowel Sounds normal.


MUSCULOSKELETAL:  Good range of motion of all major joints. Extremities without 

clubbing, cyanosis or edema.  


NEUROLOGIC EXAM:  Alert and oriented x 3   No focal sensory or strength 

deficits.   Speech normal.  Follows commands.


PSYCHIATRIC:  Mood normal.


SKIN:  detail exam as documented in skin assessment








- Constitutional


Vitals: 


                                        











Temp Pulse Resp BP Pulse Ox


 


 99.0 F   71   16   115/65   94 


 


 02/22/21 06:21  02/22/21 06:21  02/22/21 06:21  02/22/21 06:21 02/22/21 10:04











General appearance: Present: no acute distress, well-nourished





HEART Score





- HEART Score


Troponin: 


                                        











Troponin T  < 0.010 ng/mL (0.00-0.029)   02/15/21  23:28    














Results





- Labs


CBC & Chem 7: 


                                 02/17/21 04:14





                                 02/20/21 05:50


Labs: 


                             Laboratory Last Values











WBC  11.9 K/mm3 (4.5-11.0)  H  02/17/21  04:14    


 


RBC  4.28 M/mm3 (3.65-5.03)   02/17/21  04:14    


 


Hgb  13.3 gm/dl (11.8-15.2)   02/17/21  04:14    


 


Hct  40.1 % (35.5-45.6)   02/17/21  04:14    


 


MCV  94 fl (84-94)   02/17/21  04:14    


 


MCH  31 pg (28-32)   02/17/21  04:14    


 


MCHC  33 % (32-34)   02/17/21  04:14    


 


RDW  14.1 % (13.2-15.2)   02/17/21  04:14    


 


Plt Count  220 K/mm3 (140-440)   02/17/21  04:14    


 


Lymph % (Auto)  5.4 % (13.4-35.0)  L  02/17/21  04:14    


 


Mono % (Auto)  6.9 % (0.0-7.3)   02/17/21  04:14    


 


Eos % (Auto)  0.0 % (0.0-4.3)   02/17/21  04:14    


 


Baso % (Auto)  0.2 % (0.0-1.8)   02/17/21  04:14    


 


Lymph # (Auto)  0.6 K/mm3 (1.2-5.4)  L  02/17/21  04:14    


 


Mono # (Auto)  0.8 K/mm3 (0.0-0.8)   02/17/21  04:14    


 


Eos # (Auto)  0.0 K/mm3 (0.0-0.4)   02/17/21  04:14    


 


Baso # (Auto)  0.0 K/mm3 (0.0-0.1)   02/17/21  04:14    


 


Seg Neutrophils %  87.5 % (40.0-70.0)  H  02/17/21  04:14    


 


Seg Neutrophils #  10.4 K/mm3 (1.8-7.7)  H  02/17/21  04:14    


 


PT  14.1 Sec. (12.2-14.9)   02/17/21  04:14    


 


INR  1.11  (0.87-1.13)   02/17/21  04:14    


 


APTT  26.5 Sec. (24.2-36.6)   02/15/21  23:28    


 


D-Dimer  3581.74 ng/mlDDU (0-234)  H  02/21/21  05:41    


 


Sodium  140 mmol/L (137-145)   02/20/21  05:50    


 


Potassium  4.7 mmol/L (3.6-5.0)   02/20/21  05:50    


 


Chloride  103.4 mmol/L ()   02/20/21  05:50    


 


Carbon Dioxide  30 mmol/L (22-30)   02/20/21  05:50    


 


Anion Gap  11 mmol/L  02/20/21  05:50    


 


BUN  26 mg/dL (9-20)  H  02/20/21  05:50    


 


Creatinine  0.5 mg/dL (0.8-1.3)  L  02/20/21  05:50    


 


Estimated GFR  > 60 ml/min  02/20/21  05:50    


 


BUN/Creatinine Ratio  52 %  02/20/21  05:50    


 


Glucose  88 mg/dL ()   02/20/21  05:50    


 


Lactic Acid  1.40 mmol/L (0.7-2.0)   02/16/21  04:51    


 


Calcium  7.7 mg/dL (8.4-10.2)  L  02/20/21  05:50    


 


Ferritin  357.0 ng/mL (30.0-300.0)  H  02/21/21  05:41    


 


Total Bilirubin  0.20 mg/dL (0.1-1.2)   02/20/21  05:50    


 


AST  64 units/L (5-40)  H  02/20/21  05:50    


 


ALT  46 units/L (7-56)   02/20/21  05:50    


 


Alkaline Phosphatase  64 units/L ()   02/20/21  05:50    


 


Lactate Dehydrogenase  478 units/L ()  H  02/21/21  05:41    


 


Troponin T  < 0.010 ng/mL (0.00-0.029)   02/15/21  23:28    


 


C-Reactive Protein  3.00 mg/dL (0.00-1.30)  H  02/21/21  05:41    


 


Total Protein  5.3 g/dL (6.3-8.2)  L  02/20/21  05:50    


 


Albumin  2.8 g/dL (3.9-5)  L  02/20/21  05:50    


 


Albumin/Globulin Ratio  1.1 %  02/20/21  05:50    


 


Procalcitonin  0.46 ng/mL (<0.15)   02/15/21  23:28    


 


TSH  0.573 mlU/mL (0.270-4.200)   02/16/21  13:51    


 


Free T4  1.21 ng/dL (0.76-1.46)   02/16/21  13:51    


 


Nasal Screen MRSA (PCR)  Negative  (Negative)   02/16/21  Unknown


 


Coronavirus (PCR)  Positive  (Negative)  A  02/16/21  10:18    











Godwin/IV: 


                                        





Voiding Method                   Bedside Commode











Active Medications





- Current Medications


Current Medications: 














Generic Name Dose Route Start Last Admin





  Trade Name Freq  PRN Reason Stop Dose Admin


 


Acetaminophen  650 mg  02/16/21 00:57 





  Acetaminophen 325 Mg Tab  PO  





  Q4H PRN  





  Pain MILD(1-3)/Fever >100.5/HA  


 


Ascorbic Acid  1,000 mg  02/18/21 10:00  02/22/21 11:13





  Ascorbic Acid 500 Mg Tab  PO   1,000 mg





  BID YRN   Administration


 


Cholecalciferol  5,000 unit  02/18/21 10:00  02/22/21 11:13





  Cholecalciferol (Vit D3) 5,000 Unit Tab  PO   5,000 unit





  DAILY YRN   Administration


 


Dexamethasone  6 mg  02/16/21 10:00  02/22/21 11:13





  Dexamethasone 4 Mg/Ml Vial  IV  02/25/21 10:01  6 mg





  Q24HR YRN   Administration


 


Enoxaparin Sodium  70 mg  02/16/21 11:30  02/22/21 11:12





  Enoxaparin 80 Mg/0.8 Ml Inj  SUB-Q   70 mg





  Q12HR YRN   Administration


 


Magnesium Hydroxide  30 ml  02/16/21 00:57 





  Magnesium Hydroxide (Mom) Oral Liqd Udc  PO  





  Q4H PRN  





  Constipation  


 


Morphine Sulfate  2 mg  02/16/21 00:57 





  Morphine 2 Mg/1 Ml Inj  IV  





  Q4H PRN  





  Pain, Moderate (4-6)  


 


Ondansetron HCl  4 mg  02/16/21 00:57 





  Ondansetron 4 Mg/2 Ml Inj  IV  





  Q8H PRN  





  Nausea And Vomiting  


 


Sodium Chloride  10 ml  02/16/21 10:00  02/22/21 11:14





  Sodium Chloride 0.9% 10 Ml Flush Syringe  IV   10 ml





  BID YRN   Administration


 


Sodium Chloride  10 ml  02/16/21 00:57 





  Sodium Chloride 0.9% 10 Ml Flush Syringe  IV  





  PRN PRN  





  LINE FLUSH  


 


Zinc Sulfate  220 mg  02/18/21 10:00  02/22/21 11:13





  Zinc Sulfate 220 Mg Cap  PO   220 mg





  QDAY YRN   Administration














Nutrition/Malnutrition Assess





- Dietary Evaluation


Nutrition/Malnutrition Findings: 


                                        





Nutrition Notes                                            Start:  02/16/21 

12:36


Freq:                                                      Status: Active       

 


Protocol:                                                                       

 


 Document     02/18/21 13:19  AT  (Rec: 02/18/21 13:28  AT  [id number])


 Co-Sign      02/18/21 13:19  MK


 Nutrition Notes


     Initial or Follow up                        Reassessment


     Current Diagnosis                           Sepsis,Respiratory Failure


     Other Pertinent Diagnosis                   COVID(+), SOB, Pneumonia


     Current Diet                                Regular Diet


     Labs/Tests                                  Na 136


                                                 BUN 25


                                                 Cr 0.6


     Pertinent Medications                       Zinc Sulfate


                                                 Vit D3


                                                 Vit C


                                                 Lasix


                                                 Decadron


     Height                                      5 ft 9 in


     Weight                                      68.039 kg


     Ideal Body Weight (kg)                      72.72


     BMI                                         22.1


     Intake Prior to Admission                   Poor


     Weight Status                               Appropriate


     Subjective/Other Information                Follow up for intakes, ONS


                                                 tolerance, BM. Was unable to


                                                 reach pt by the phone x2. Per


                                                 chart, pt is consuming 100% of


                                                 meals and ONS. Per RN, the pt


                                                 has no wounds, but has a


                                                 Vikash Score of 16. Per chart,


                                                 pt had a normal BM yesterday.


     Percent of energy/protein needs met:        100%/100%


     Burn                                        Absent


     Trauma                                      Absent


     GI Symptoms                                 None


     Food Allergy                                No


     Current % PO                                Good (%)


     Minimum of two criteria                     No


     Reduced  Strength                       Measurably Reduced (severe)


     #2


      Nutrition Diagnosis                        No nutrition diagnosis at this


                                                 time


     #1


      Nutrition Diagnosis                        Predicted suboptimal energy


                                                 intake


      As Evidenced by Signs and Symptoms         pt consuming 100% of meals and


                                                 ONS x2 days


      Diagnosis Progress(for reassessment        Resolved


       documentation)                            


     Is patient on ventilator?                   No


     Is Patient Ambulatory and/or Out of Bed     No


     REE-(Donley-St. Jeor-confined to bed)      9350.240


     Calculation Used for Recommendations        [facility]


     Additional Notes                            PRO needs: 68-82g (1-1.2 g/kg)


                                                 Fluid needs: 1 mL/kcal or per


                                                 MD


 Nutrition Intervention


     Change Diet Order:                          Continue Regular Diet


     Add Supplement/Snack (indicate name/kcal    Ensure Enlive BID


      /protein )                                 


     Provides kCal:                              700


     Provides Protein (gm)                       40


     Goal #1                                     Maintain weight


     Goal #2                                     Meet at least 75% of estimated


                                                 energy and protein needs via


                                                 diet and ONS


     Anticipated Discharge Needs:                Regular diet


     Follow-Up By:                               02/25/21


     Additional Comments                         F/U for stable intakes

## 2021-02-22 NOTE — PROGRESS NOTE
Assessment and Plan





Agree with COVID precautions


Wean FiO2 for sats >88%


Agree with steroids, 10 days total.  Has finished Remdesivir


Prone as tolerated during the day and sleep prone at night.


Guarded prognosis.





Subjective


Date of service: 02/22/21


Interval history: 





Remains on HFNC, still at 40 and 90. sats in the mid 90's.  BP remains marginal 

so not able to diurese.  Remainder is negative.





Objective


                               Vital Signs - 12hr











  02/22/21 02/22/21 02/22/21





  02:45 06:21 10:04


 


Temperature  99.0 F 


 


Pulse Rate  71 


 


Respiratory  16 





Rate   


 


Blood Pressure  115/65 


 


O2 Sat by Pulse 92 91 94





Oximetry   














  02/22/21 02/22/21





  11:06 13:03


 


Temperature  98 F


 


Pulse Rate 81 


 


Respiratory 22 





Rate  


 


Blood Pressure 106/63 


 


O2 Sat by Pulse 96 





Oximetry  











Constitutional: no acute distress, alert


Eyes: non-icteric


ENT: oropharynx moist


Neck: supple


Ascultation: Bilateral: diminished breath sounds


Cardiovascular: regular rate and rhythm


Gastrointestinal: normoactive bowel sounds, soft, non-tender


Integumentary: normal


Extremities: no cyanosis


Neurologic: normal mental status


CBC and BMP: 


                                 02/17/21 04:14





                                 02/20/21 05:50


ABG, PT/INR, D-dimer: 


PT/INR, D-dimer











PT  14.1 Sec. (12.2-14.9)   02/17/21  04:14    


 


INR  1.11  (0.87-1.13)   02/17/21  04:14    


 


D-Dimer  3581.74 ng/mlDDU (0-234)  H  02/21/21  05:41    








Abnormal lab findings: 


                                  Abnormal Labs











  02/15/21 02/15/21 02/15/21





  23:28 23:28 23:28


 


WBC   


 


Lymph % (Auto)  4.1 L  


 


Mono % (Auto)  7.8 H  


 


Lymph # (Auto)  0.3 L  


 


Seg Neutrophils %  86.9 H  


 


Seg Neutrophils #   


 


D-Dimer   6514.44 H 


 


Sodium    135 L


 


Chloride    95.2 L


 


Carbon Dioxide   


 


BUN   


 


Creatinine    0.7 L


 


Glucose   


 


Calcium   


 


Ferritin   


 


AST   


 


Lactate Dehydrogenase   


 


C-Reactive Protein   


 


Total Protein   


 


Albumin    3.0 L


 


Coronavirus (PCR)   














  02/15/21 02/15/21 02/16/21





  23:28 23:28 10:18


 


WBC   


 


Lymph % (Auto)   


 


Mono % (Auto)   


 


Lymph # (Auto)   


 


Seg Neutrophils %   


 


Seg Neutrophils #   


 


D-Dimer   


 


Sodium   


 


Chloride   


 


Carbon Dioxide   


 


BUN   


 


Creatinine   


 


Glucose   


 


Calcium   


 


Ferritin  737.7 H  


 


AST   


 


Lactate Dehydrogenase   636 H 


 


C-Reactive Protein   17.30 H 


 


Total Protein   


 


Albumin   


 


Coronavirus (PCR)    Positive A














  02/17/21 02/17/21 02/18/21





  04:14 04:14 05:19


 


WBC  11.9 H  


 


Lymph % (Auto)  5.4 L  


 


Mono % (Auto)   


 


Lymph # (Auto)  0.6 L  


 


Seg Neutrophils %  87.5 H  


 


Seg Neutrophils #  10.4 H  


 


D-Dimer   


 


Sodium    136 L


 


Chloride   


 


Carbon Dioxide   


 


BUN   27 H  25 H


 


Creatinine   0.7 L  0.6 L


 


Glucose   138 H 


 


Calcium   8.3 L  7.8 L


 


Ferritin   


 


AST   


 


Lactate Dehydrogenase   


 


C-Reactive Protein   


 


Total Protein    5.3 L


 


Albumin    2.9 L


 


Coronavirus (PCR)   














  02/19/21 02/19/21 02/19/21





  05:26 05:26 05:26


 


WBC   


 


Lymph % (Auto)   


 


Mono % (Auto)   


 


Lymph # (Auto)   


 


Seg Neutrophils %   


 


Seg Neutrophils #   


 


D-Dimer   5476.96 H 


 


Sodium   


 


Chloride   


 


Carbon Dioxide  31 H  


 


BUN  22 H  


 


Creatinine  0.5 L  


 


Glucose  117 H  


 


Calcium  7.9 L  


 


Ferritin    382.0 H


 


AST   


 


Lactate Dehydrogenase   


 


C-Reactive Protein   


 


Total Protein  5.8 L  


 


Albumin  2.7 L  


 


Coronavirus (PCR)   














  02/19/21 02/20/21 02/21/21





  05:26 05:50 05:41


 


WBC   


 


Lymph % (Auto)   


 


Mono % (Auto)   


 


Lymph # (Auto)   


 


Seg Neutrophils %   


 


Seg Neutrophils #   


 


D-Dimer    3581.74 H


 


Sodium   


 


Chloride   


 


Carbon Dioxide   


 


BUN   26 H 


 


Creatinine   0.5 L 


 


Glucose   


 


Calcium   7.7 L 


 


Ferritin   


 


AST   64 H 


 


Lactate Dehydrogenase  424 H  


 


C-Reactive Protein  3.10 H  


 


Total Protein   5.3 L 


 


Albumin   2.8 L 


 


Coronavirus (PCR)   














  02/21/21 02/21/21





  05:41 05:41


 


WBC  


 


Lymph % (Auto)  


 


Mono % (Auto)  


 


Lymph # (Auto)  


 


Seg Neutrophils %  


 


Seg Neutrophils #  


 


D-Dimer  


 


Sodium  


 


Chloride  


 


Carbon Dioxide  


 


BUN  


 


Creatinine  


 


Glucose  


 


Calcium  


 


Ferritin  357.0 H 


 


AST  


 


Lactate Dehydrogenase   478 H


 


C-Reactive Protein   3.00 H


 


Total Protein  


 


Albumin  


 


Coronavirus (PCR)

## 2021-02-22 NOTE — PROGRESS NOTE
Assessment and Plan





Cultures:


Blood culture no growth so far





A/P: 65 man no past medical history admitted as Covid PUI





#Covid pneumonia: Elevated procalcitonin as well.





#Acute hypoxic respiratory failure: Saturations down to the 80s on admission.  

On high flow nasal cannula








Recs:


-Continue dexamethasone 6 mg every 24 hours to complete 10 days.


-Completed empiric antibiotics. 


-Completed Remdesivir


-Obtain inflammatory markers every 48-72 hours.


-Anticoagulation per hospital protocol


-Proning as able





Thank you for the consult, we will continue to follow.





ANDRES Horvath MD


[facility] ([facility])


O: [phone]


F: [phone]





 








Subjective


Date of service: 02/22/21


Interval history: 





Afebrile, no other acute changes. 





Objective





- Exam


Narrative Exam: 





Physical exam deferred due to PPE conservation strategy.  Please refer to 

primary team's note.





- Constitutional


Vitals: 


                                   Vital Signs











Temp Pulse Resp BP Pulse Ox


 


 99.0 F   71   16   115/65   94 


 


 02/22/21 06:21  02/22/21 06:21  02/22/21 06:21  02/22/21 06:21  02/22/21 10:04








                           Temperature -Last 24 Hours











Temperature                    99.0 F


 


Temperature                    97.7 F


 


Temperature                    97.8 F


 


Temperature                    98.4 F

















- Labs


CBC & Chem 7: 


                                 02/17/21 04:14





                                 02/20/21 05:50

## 2021-02-23 LAB — CRP SERPL-MCNC: 3.7 MG/DL (ref 0–1.3)

## 2021-02-23 RX ADMIN — ENOXAPARIN SODIUM SCH MG: 100 INJECTION SUBCUTANEOUS at 21:58

## 2021-02-23 RX ADMIN — OXYCODONE HYDROCHLORIDE AND ACETAMINOPHEN SCH MG: 500 TABLET ORAL at 21:59

## 2021-02-23 RX ADMIN — OXYCODONE HYDROCHLORIDE AND ACETAMINOPHEN SCH MG: 500 TABLET ORAL at 09:20

## 2021-02-23 RX ADMIN — Medication SCH MG: at 09:20

## 2021-02-23 RX ADMIN — ENOXAPARIN SODIUM SCH MG: 100 INJECTION SUBCUTANEOUS at 09:20

## 2021-02-23 RX ADMIN — Medication SCH ML: at 09:21

## 2021-02-23 RX ADMIN — Medication SCH ML: at 21:58

## 2021-02-23 RX ADMIN — CHOLECALCIFEROL TAB 125 MCG (5000 UNIT) SCH UNIT: 125 TAB at 09:20

## 2021-02-23 NOTE — PROGRESS NOTE
Assessment and Plan


Assessment and plan: 


(1) Pneumonia


Current Visit: Yes   Status: Acute   


Plan to address problem: 


Patient placed on empiric antibiotics. Will await culture results.








(2) Acute respiratory failure with hypoxia


Current Visit: Yes   Status: Acute   


Plan to address problem: 


Probably secondary to the pneumonia  with Covid-19,


Currently on oxygen. Will maintain oxygen saturation greater than 94%


Consult placed to Pulmonologist for evaluation.








(3) 2019 novel coronavirus infection


Current Visit: Yes   Status: Acute   


Plan to address problem: 


Consult placed to infectious disease for evalution








(4) Sepsis


Current Visit: Yes   Status: Acute   


Plan to address problem: 


Secondary to pneumonia.


Will continue on antibiotics and monitor labs.








(5) DVT bilateral lower extremity


Current Visit: Yes   Status: Acute   


Plan to address problem: 


On full anticoagulation








(6) Full code status


Current Visit: Yes   Status: Acute   


Plan to address problem: 


Patient is a full code.


Closely monitor the patient and adjust management as needed


Plan of care reviewed with the patient and his nurse





History


Interval history: 


I have seen and examined the patient at the bedside


Strict isolation precautions and PPE protocols followed


Patient remains on high flow oxygen 40 L


In mild distress











Hospitalist Physical





- Constitutional


Vitals: 


                                        











Temp Pulse Resp BP Pulse Ox


 


 97.5 F L  59 L  17   112/63   93 


 


 02/23/21 04:50  02/23/21 04:50  02/23/21 04:50  02/23/21 04:50  02/23/21 04:50











General appearance: Present: no acute distress, well-nourished





- EENT


Eyes: Present: PERRL, EOM intact





- Neck


Neck: Present: supple, normal ROM





- Respiratory


Respiratory effort: normal


Respiratory: bilateral: diminished, negative: rales, rhonchi, wheezing





- Cardiovascular


Rhythm: regular


Heart Sounds: Present: S1 & S2





- Extremities


Extremities: no ischemia, No edema





- Abdominal


General gastrointestinal: soft, non-tender, non-distended, normal bowel sounds





- Integumentary


Integumentary: Present: clear, warm





- Psychiatric


Psychiatric: appropriate mood/affect, cooperative





- Neurologic


Neurologic: CNII-XII intact, moves all extremities





HEART Score





- HEART Score


Troponin: 


                                        











Troponin T  < 0.010 ng/mL (0.00-0.029)   02/15/21  23:28    














Results





- Labs


CBC & Chem 7: 


                                 02/17/21 04:14





                                 02/20/21 05:50


Labs: 


                             Laboratory Last Values











WBC  11.9 K/mm3 (4.5-11.0)  H  02/17/21  04:14    


 


RBC  4.28 M/mm3 (3.65-5.03)   02/17/21  04:14    


 


Hgb  13.3 gm/dl (11.8-15.2)   02/17/21  04:14    


 


Hct  40.1 % (35.5-45.6)   02/17/21  04:14    


 


MCV  94 fl (84-94)   02/17/21  04:14    


 


MCH  31 pg (28-32)   02/17/21  04:14    


 


MCHC  33 % (32-34)   02/17/21  04:14    


 


RDW  14.1 % (13.2-15.2)   02/17/21  04:14    


 


Plt Count  220 K/mm3 (140-440)   02/17/21  04:14    


 


Lymph % (Auto)  5.4 % (13.4-35.0)  L  02/17/21  04:14    


 


Mono % (Auto)  6.9 % (0.0-7.3)   02/17/21  04:14    


 


Eos % (Auto)  0.0 % (0.0-4.3)   02/17/21  04:14    


 


Baso % (Auto)  0.2 % (0.0-1.8)   02/17/21  04:14    


 


Lymph # (Auto)  0.6 K/mm3 (1.2-5.4)  L  02/17/21  04:14    


 


Mono # (Auto)  0.8 K/mm3 (0.0-0.8)   02/17/21  04:14    


 


Eos # (Auto)  0.0 K/mm3 (0.0-0.4)   02/17/21  04:14    


 


Baso # (Auto)  0.0 K/mm3 (0.0-0.1)   02/17/21  04:14    


 


Seg Neutrophils %  87.5 % (40.0-70.0)  H  02/17/21  04:14    


 


Seg Neutrophils #  10.4 K/mm3 (1.8-7.7)  H  02/17/21  04:14    


 


PT  14.1 Sec. (12.2-14.9)   02/17/21  04:14    


 


INR  1.11  (0.87-1.13)   02/17/21  04:14    


 


APTT  26.5 Sec. (24.2-36.6)   02/15/21  23:28    


 


D-Dimer  1125.93 ng/mlDDU (0-234)  H  02/23/21  04:58    


 


Sodium  140 mmol/L (137-145)   02/20/21  05:50    


 


Potassium  4.7 mmol/L (3.6-5.0)   02/20/21  05:50    


 


Chloride  103.4 mmol/L ()   02/20/21  05:50    


 


Carbon Dioxide  30 mmol/L (22-30)   02/20/21  05:50    


 


Anion Gap  11 mmol/L  02/20/21  05:50    


 


BUN  26 mg/dL (9-20)  H  02/20/21  05:50    


 


Creatinine  0.5 mg/dL (0.8-1.3)  L  02/20/21  05:50    


 


Estimated GFR  > 60 ml/min  02/20/21  05:50    


 


BUN/Creatinine Ratio  52 %  02/20/21  05:50    


 


Glucose  88 mg/dL ()   02/20/21  05:50    


 


Lactic Acid  1.40 mmol/L (0.7-2.0)   02/16/21  04:51    


 


Calcium  7.7 mg/dL (8.4-10.2)  L  02/20/21  05:50    


 


Ferritin  333.2 ng/mL (30.0-300.0)  H  02/23/21  04:58    


 


Total Bilirubin  0.20 mg/dL (0.1-1.2)   02/20/21  05:50    


 


AST  64 units/L (5-40)  H  02/20/21  05:50    


 


ALT  46 units/L (7-56)   02/20/21  05:50    


 


Alkaline Phosphatase  64 units/L ()   02/20/21  05:50    


 


Lactate Dehydrogenase  382 units/L ()  H  02/23/21  04:58    


 


Troponin T  < 0.010 ng/mL (0.00-0.029)   02/15/21  23:28    


 


C-Reactive Protein  3.70 mg/dL (0.00-1.30)  H  02/23/21  04:58    


 


Total Protein  5.3 g/dL (6.3-8.2)  L  02/20/21  05:50    


 


Albumin  2.8 g/dL (3.9-5)  L  02/20/21  05:50    


 


Albumin/Globulin Ratio  1.1 %  02/20/21  05:50    


 


Procalcitonin  0.46 ng/mL (<0.15)   02/15/21  23:28    


 


TSH  0.573 mlU/mL (0.270-4.200)   02/16/21  13:51    


 


Free T4  1.21 ng/dL (0.76-1.46)   02/16/21  13:51    


 


Nasal Screen MRSA (PCR)  Negative  (Negative)   02/16/21  Unknown


 


Coronavirus (PCR)  Positive  (Negative)  A  02/16/21  10:18    











Godwin/IV: 


                                        





Voiding Method                   Urinal











Active Medications





- Current Medications


Current Medications: 














Generic Name Dose Route Start Last Admin





  Trade Name Freq  PRN Reason Stop Dose Admin


 


Acetaminophen  650 mg  02/16/21 00:57 





  Acetaminophen 325 Mg Tab  PO  





  Q4H PRN  





  Pain MILD(1-3)/Fever >100.5/HA  


 


Ascorbic Acid  1,000 mg  02/18/21 10:00  02/22/21 22:03





  Ascorbic Acid 500 Mg Tab  PO   1,000 mg





  BID YRN   Administration


 


Cholecalciferol  5,000 unit  02/18/21 10:00  02/22/21 11:13





  Cholecalciferol (Vit D3) 5,000 Unit Tab  PO   5,000 unit





  DAILY YRN   Administration


 


Dexamethasone  6 mg  02/16/21 10:00  02/22/21 11:13





  Dexamethasone 4 Mg/Ml Vial  IV  02/25/21 10:01  6 mg





  Q24HR YRN   Administration


 


Enoxaparin Sodium  70 mg  02/16/21 11:30  02/22/21 22:03





  Enoxaparin 80 Mg/0.8 Ml Inj  SUB-Q   70 mg





  Q12HR YRN   Administration


 


Magnesium Hydroxide  30 ml  02/16/21 00:57 





  Magnesium Hydroxide (Mom) Oral Liqd Udc  PO  





  Q4H PRN  





  Constipation  


 


Morphine Sulfate  2 mg  02/16/21 00:57  02/22/21 22:14





  Morphine 2 Mg/1 Ml Inj  IV   2 mg





  Q4H PRN   Administration





  Pain, Moderate (4-6)  


 


Ondansetron HCl  4 mg  02/16/21 00:57 





  Ondansetron 4 Mg/2 Ml Inj  IV  





  Q8H PRN  





  Nausea And Vomiting  


 


Sodium Chloride  10 ml  02/16/21 10:00  02/22/21 22:03





  Sodium Chloride 0.9% 10 Ml Flush Syringe  IV   10 ml





  BID YRN   Administration


 


Sodium Chloride  10 ml  02/16/21 00:57 





  Sodium Chloride 0.9% 10 Ml Flush Syringe  IV  





  PRN PRN  





  LINE FLUSH  


 


Zinc Sulfate  220 mg  02/18/21 10:00  02/22/21 11:13





  Zinc Sulfate 220 Mg Cap  PO   220 mg





  QDAY YRN   Administration














Nutrition/Malnutrition Assess





- Dietary Evaluation


Nutrition/Malnutrition Findings: 


                                        





Nutrition Notes                                            Start:  02/16/21 

12:36


Freq:                                                      Status: Active       




Protocol:                                                                       




 Document     02/18/21 13:19  AT  (Rec: 02/18/21 13:28  AT  [id number])


 Co-Sign      02/18/21 13:19  MK


 Nutrition Notes


     Initial or Follow up                        Reassessment


     Current Diagnosis                           Sepsis,Respiratory Failure


     Other Pertinent Diagnosis                   COVID(+), SOB, Pneumonia


     Current Diet                                Regular Diet


     Labs/Tests                                  Na 136


                                                 BUN 25


                                                 Cr 0.6


     Pertinent Medications                       Zinc Sulfate


                                                 Vit D3


                                                 Vit C


                                                 Lasix


                                                 Decadron


     Height                                      5 ft 9 in


     Weight                                      68.039 kg


     Ideal Body Weight (kg)                      72.72


     BMI                                         22.1


     Intake Prior to Admission                   Poor


     Weight Status                               Appropriate


     Subjective/Other Information                Follow up for intakes, ONS


                                                 tolerance, BM. Was unable to


                                                 reach pt by the phone x2. Per


                                                 chart, pt is consuming 100% of


                                                 meals and ONS. Per RN, the pt


                                                 has no wounds, but has a


                                                 Vikash Score of 16. Per chart,


                                                 pt had a normal BM yesterday.


     Percent of energy/protein needs met:        100%/100%


     Burn                                        Absent


     Trauma                                      Absent


     GI Symptoms                                 None


     Food Allergy                                No


     Current % PO                                Good (%)


     Minimum of two criteria                     No


     Reduced  Strength                       Measurably Reduced (severe)


     #2


      Nutrition Diagnosis                        No nutrition diagnosis at this


                                                 time


     #1


      Nutrition Diagnosis                        Predicted suboptimal energy


                                                 intake


      As Evidenced by Signs and Symptoms         pt consuming 100% of meals and


                                                 ONS x2 days


      Diagnosis Progress(for reassessment        Resolved


       documentation)                            


     Is patient on ventilator?                   No


     Is Patient Ambulatory and/or Out of Bed     No


     REE-([facility]-confined to bed)      9116.869


     Calculation Used for Recommendations        [facility]


     Additional Notes                            PRO needs: 68-82g (1-1.2 g/kg)


                                                 Fluid needs: 1 mL/kcal or per


                                                 MD


 Nutrition Intervention


     Change Diet Order:                          Continue Regular Diet


     Add Supplement/Snack (indicate name/kcal    Ensure Enlive BID


      /protein )                                 


     Provides kCal:                              700


     Provides Protein (gm)                       40


     Goal #1                                     Maintain weight


     Goal #2                                     Meet at least 75% of estimated


                                                 energy and protein needs via


                                                 diet and ONS


     Anticipated Discharge Needs:                Regular diet


     Follow-Up By:                               02/25/21


     Additional Comments                         F/U for stable intakes

## 2021-02-23 NOTE — PROGRESS NOTE
Assessment and Plan





Cultures:


Blood culture no growth so far





A/P: 65 man no past medical history admitted with COVID-19





#Covid pneumonia: Elevated procalcitonin as well.  Completed empiric antibiotics

and remdesivir.





#Acute hypoxic respiratory failure: Saturations down to the 80s on admission.  

On high flow nasal cannula.








Recs:


-Continue dexamethasone 6 mg every 24 hours to complete 10 days.


-Obtain inflammatory markers every 48-72 hours.


-Anticoagulation per hospital protocol


-Proning as able





Thank you for the consult, we will continue to follow.





ANDRES Horvath MD


[facility] ([facility])


O: [phone]


F: [phone]





 








Subjective


Date of service: 02/23/21


Interval history: 





Afebrile, no acute change.  Currently on high flow nasal cannula.





Objective





- Exam


Narrative Exam: 





Physical exam deferred due to PPE conservation strategy.  Please refer to 

primary team's note.





- Constitutional


Vitals: 


                                   Vital Signs











Temp Pulse Resp BP Pulse Ox


 


 97.5 F L  59 L  17   112/63   97 


 


 02/23/21 04:50  02/23/21 04:50  02/23/21 04:50  02/23/21 04:50  02/23/21 08:38








                           Temperature -Last 24 Hours











Temperature                    97.5 F


 


Temperature                    97.2 F


 


Temperature                    97.2 F


 


Temperature                    98.3 F


 


Temperature                    98 F

















- Labs


CBC & Chem 7: 


                                 02/17/21 04:14





                                 02/20/21 05:50


Labs: 


                              Abnormal lab results











  02/23/21 02/23/21 02/23/21 Range/Units





  04:58 04:58 04:58 


 


D-Dimer  1125.93 H    (0-234)  ng/mlDDU


 


Ferritin   333.2 H   (30.0-300.0)  ng/mL


 


Lactate Dehydrogenase    382 H  ()  units/L


 


C-Reactive Protein    3.70 H  (0.00-1.30)  mg/dL

## 2021-02-23 NOTE — PROGRESS NOTE
Assessment and Plan





Agree with COVID precautions


Wean FiO2 for sats >88%


Agree with steroids, 10 days total.  Has finished Remdesivir


Prone as tolerated during the day and sleep prone at night.


Guarded prognosis.





Subjective


Date of service: 02/23/21


Interval history: 





No acute events.  No weaning of HFNC.





Objective


                               Vital Signs - 12hr











  02/23/21 02/23/21 02/23/21





  00:10 02:00 04:50


 


Temperature 97.2 F L  97.5 F L


 


Pulse Rate 91 H  59 L


 


Respiratory 17  17





Rate   


 


Blood Pressure   112/63


 


Blood Pressure 112/69  





[Left]   


 


O2 Sat by Pulse 96 95 93





Oximetry   














  02/23/21





  08:38


 


Temperature 


 


Pulse Rate 


 


Respiratory 





Rate 


 


Blood Pressure 


 


Blood Pressure 





[Left] 


 


O2 Sat by Pulse 97





Oximetry 











Constitutional: no acute distress, alert


Eyes: non-icteric


ENT: oropharynx moist


Neck: supple


Ascultation: Bilateral: diminished breath sounds


Cardiovascular: regular rate and rhythm


Gastrointestinal: normoactive bowel sounds, soft, non-tender


Integumentary: normal


Extremities: no cyanosis


Neurologic: normal mental status


CBC and BMP: 


                                 02/17/21 04:14





                                 02/20/21 05:50


ABG, PT/INR, D-dimer: 


PT/INR, D-dimer











PT  14.1 Sec. (12.2-14.9)   02/17/21  04:14    


 


INR  1.11  (0.87-1.13)   02/17/21  04:14    


 


D-Dimer  1125.93 ng/mlDDU (0-234)  H  02/23/21  04:58    








Abnormal lab findings: 


                                  Abnormal Labs











  02/15/21 02/15/21 02/15/21





  23:28 23:28 23:28


 


WBC   


 


Lymph % (Auto)  4.1 L  


 


Mono % (Auto)  7.8 H  


 


Lymph # (Auto)  0.3 L  


 


Seg Neutrophils %  86.9 H  


 


Seg Neutrophils #   


 


D-Dimer   6514.44 H 


 


Sodium    135 L


 


Chloride    95.2 L


 


Carbon Dioxide   


 


BUN   


 


Creatinine    0.7 L


 


Glucose   


 


Calcium   


 


Ferritin   


 


AST   


 


Lactate Dehydrogenase   


 


C-Reactive Protein   


 


Total Protein   


 


Albumin    3.0 L


 


Coronavirus (PCR)   














  02/15/21 02/15/21 02/16/21





  23:28 23:28 10:18


 


WBC   


 


Lymph % (Auto)   


 


Mono % (Auto)   


 


Lymph # (Auto)   


 


Seg Neutrophils %   


 


Seg Neutrophils #   


 


D-Dimer   


 


Sodium   


 


Chloride   


 


Carbon Dioxide   


 


BUN   


 


Creatinine   


 


Glucose   


 


Calcium   


 


Ferritin  737.7 H  


 


AST   


 


Lactate Dehydrogenase   636 H 


 


C-Reactive Protein   17.30 H 


 


Total Protein   


 


Albumin   


 


Coronavirus (PCR)    Positive A














  02/17/21 02/17/21 02/18/21





  04:14 04:14 05:19


 


WBC  11.9 H  


 


Lymph % (Auto)  5.4 L  


 


Mono % (Auto)   


 


Lymph # (Auto)  0.6 L  


 


Seg Neutrophils %  87.5 H  


 


Seg Neutrophils #  10.4 H  


 


D-Dimer   


 


Sodium    136 L


 


Chloride   


 


Carbon Dioxide   


 


BUN   27 H  25 H


 


Creatinine   0.7 L  0.6 L


 


Glucose   138 H 


 


Calcium   8.3 L  7.8 L


 


Ferritin   


 


AST   


 


Lactate Dehydrogenase   


 


C-Reactive Protein   


 


Total Protein    5.3 L


 


Albumin    2.9 L


 


Coronavirus (PCR)   














  02/19/21 02/19/21 02/19/21





  05:26 05:26 05:26


 


WBC   


 


Lymph % (Auto)   


 


Mono % (Auto)   


 


Lymph # (Auto)   


 


Seg Neutrophils %   


 


Seg Neutrophils #   


 


D-Dimer   5476.96 H 


 


Sodium   


 


Chloride   


 


Carbon Dioxide  31 H  


 


BUN  22 H  


 


Creatinine  0.5 L  


 


Glucose  117 H  


 


Calcium  7.9 L  


 


Ferritin    382.0 H


 


AST   


 


Lactate Dehydrogenase   


 


C-Reactive Protein   


 


Total Protein  5.8 L  


 


Albumin  2.7 L  


 


Coronavirus (PCR)   














  02/19/21 02/20/21 02/21/21





  05:26 05:50 05:41


 


WBC   


 


Lymph % (Auto)   


 


Mono % (Auto)   


 


Lymph # (Auto)   


 


Seg Neutrophils %   


 


Seg Neutrophils #   


 


D-Dimer    3581.74 H


 


Sodium   


 


Chloride   


 


Carbon Dioxide   


 


BUN   26 H 


 


Creatinine   0.5 L 


 


Glucose   


 


Calcium   7.7 L 


 


Ferritin   


 


AST   64 H 


 


Lactate Dehydrogenase  424 H  


 


C-Reactive Protein  3.10 H  


 


Total Protein   5.3 L 


 


Albumin   2.8 L 


 


Coronavirus (PCR)   














  02/21/21 02/21/21 02/23/21





  05:41 05:41 04:58


 


WBC   


 


Lymph % (Auto)   


 


Mono % (Auto)   


 


Lymph # (Auto)   


 


Seg Neutrophils %   


 


Seg Neutrophils #   


 


D-Dimer    1125.93 H


 


Sodium   


 


Chloride   


 


Carbon Dioxide   


 


BUN   


 


Creatinine   


 


Glucose   


 


Calcium   


 


Ferritin  357.0 H  


 


AST   


 


Lactate Dehydrogenase   478 H 


 


C-Reactive Protein   3.00 H 


 


Total Protein   


 


Albumin   


 


Coronavirus (PCR)   














  02/23/21 02/23/21





  04:58 04:58


 


WBC  


 


Lymph % (Auto)  


 


Mono % (Auto)  


 


Lymph # (Auto)  


 


Seg Neutrophils %  


 


Seg Neutrophils #  


 


D-Dimer  


 


Sodium  


 


Chloride  


 


Carbon Dioxide  


 


BUN  


 


Creatinine  


 


Glucose  


 


Calcium  


 


Ferritin  333.2 H 


 


AST  


 


Lactate Dehydrogenase   382 H


 


C-Reactive Protein   3.70 H


 


Total Protein  


 


Albumin  


 


Coronavirus (PCR)

## 2021-02-24 RX ADMIN — Medication SCH ML: at 22:50

## 2021-02-24 RX ADMIN — ENOXAPARIN SODIUM SCH MG: 100 INJECTION SUBCUTANEOUS at 22:50

## 2021-02-24 RX ADMIN — Medication SCH MG: at 09:34

## 2021-02-24 RX ADMIN — Medication SCH ML: at 09:46

## 2021-02-24 RX ADMIN — OXYCODONE HYDROCHLORIDE AND ACETAMINOPHEN SCH MG: 500 TABLET ORAL at 22:50

## 2021-02-24 RX ADMIN — ENOXAPARIN SODIUM SCH MG: 100 INJECTION SUBCUTANEOUS at 09:34

## 2021-02-24 RX ADMIN — OXYCODONE HYDROCHLORIDE AND ACETAMINOPHEN SCH MG: 500 TABLET ORAL at 09:34

## 2021-02-24 RX ADMIN — CHOLECALCIFEROL TAB 125 MCG (5000 UNIT) SCH UNIT: 125 TAB at 09:34

## 2021-02-24 NOTE — PROGRESS NOTE
Assessment and Plan


Assessment and plan: 


--2019 novel coronavirus infection


Current Visit: Yes   Status: Acute   


Plan to address problem: 


Contact and droplet isolation


Management of COVID-19 per protocols


Completed empiric antibiotics and remdesivir


Continue dexamethasone for total 10 days last dose tomorrow


Prone position as tolerated


Home oxygen evaluation at discharge





-- Pneumonia


Current Visit: Yes   Status: Acute   


Plan to address problem: 


Completed empiric antibiotics, cultures negative to date





--Acute respiratory failure with hypoxia


Current Visit: Yes   Status: Acute   


Plan to address problem: 


Patient is requiring high flow nasal cannula oxygen


40 L, titrate and wean as tolerated


Prone position, Home O2 evaluation





--Sepsis secondary to pneumonia


Current Visit: Yes   Status: Acute   


Plan to address problem: 


Completed antibiotics.  Supportive care





--DVT bilateral lower extremity


Current Visit: Yes   Status: Acute   


Plan to address problem: 


On full anticoagulation





--Full code status


Current Visit: Yes   Status: Acute   


Plan to address problem: 


Patient is a full code.





We will closely monitor the patient and adjust management as needed


Plan of care reviewed with the patient and his nurse








History


Interval history: 


I have seen and examined the patient at the bedside this morning 


patient's chart and medications, Tests and reports reviewed


I have followed strict isolation and PPE precautions and protocols per COVID-19 

guidelines


Patient continues to require high flow oxygen 40 L


In mild distress, anxious to go home


Vital signs noted afebrile








Hospitalist Physical





- Constitutional


Vitals: 


                                        











Temp Pulse Resp BP Pulse Ox


 


 97.7 F   73   20   99/60   93 


 


 02/24/21 06:13  02/24/21 06:13  02/24/21 06:13  02/24/21 06:13  02/24/21 06:13











General appearance: Present: no acute distress, well-nourished





- EENT


Eyes: Present: PERRL, EOM intact





- Neck


Neck: Present: supple, normal ROM





- Respiratory


Respiratory effort: normal


Respiratory: bilateral: diminished, negative: rales, rhonchi, wheezing





- Cardiovascular


Rhythm: regular


Heart Sounds: Present: S1 & S2





- Extremities


Extremities: no ischemia, No edema





- Abdominal


General gastrointestinal: soft, non-tender, non-distended, normal bowel sounds





- Integumentary


Integumentary: Present: clear, warm





- Psychiatric


Psychiatric: appropriate mood/affect, cooperative





- Neurologic


Neurologic: CNII-XII intact, moves all extremities





HEART Score





- HEART Score


Troponin: 


                                        











Troponin T  < 0.010 ng/mL (0.00-0.029)   02/15/21  23:28    














Results





- Labs


CBC & Chem 7: 


                                 02/17/21 04:14





                                 02/20/21 05:50


Labs: 


                             Laboratory Last Values











WBC  11.9 K/mm3 (4.5-11.0)  H  02/17/21  04:14    


 


RBC  4.28 M/mm3 (3.65-5.03)   02/17/21  04:14    


 


Hgb  13.3 gm/dl (11.8-15.2)   02/17/21  04:14    


 


Hct  40.1 % (35.5-45.6)   02/17/21  04:14    


 


MCV  94 fl (84-94)   02/17/21  04:14    


 


MCH  31 pg (28-32)   02/17/21  04:14    


 


MCHC  33 % (32-34)   02/17/21  04:14    


 


RDW  14.1 % (13.2-15.2)   02/17/21  04:14    


 


Plt Count  220 K/mm3 (140-440)   02/17/21  04:14    


 


Lymph % (Auto)  5.4 % (13.4-35.0)  L  02/17/21  04:14    


 


Mono % (Auto)  6.9 % (0.0-7.3)   02/17/21  04:14    


 


Eos % (Auto)  0.0 % (0.0-4.3)   02/17/21  04:14    


 


Baso % (Auto)  0.2 % (0.0-1.8)   02/17/21  04:14    


 


Lymph # (Auto)  0.6 K/mm3 (1.2-5.4)  L  02/17/21  04:14    


 


Mono # (Auto)  0.8 K/mm3 (0.0-0.8)   02/17/21  04:14    


 


Eos # (Auto)  0.0 K/mm3 (0.0-0.4)   02/17/21  04:14    


 


Baso # (Auto)  0.0 K/mm3 (0.0-0.1)   02/17/21  04:14    


 


Seg Neutrophils %  87.5 % (40.0-70.0)  H  02/17/21  04:14    


 


Seg Neutrophils #  10.4 K/mm3 (1.8-7.7)  H  02/17/21  04:14    


 


PT  14.1 Sec. (12.2-14.9)   02/17/21  04:14    


 


INR  1.11  (0.87-1.13)   02/17/21  04:14    


 


APTT  26.5 Sec. (24.2-36.6)   02/15/21  23:28    


 


D-Dimer  1125.93 ng/mlDDU (0-234)  H  02/23/21  04:58    


 


Sodium  140 mmol/L (137-145)   02/20/21  05:50    


 


Potassium  4.7 mmol/L (3.6-5.0)   02/20/21  05:50    


 


Chloride  103.4 mmol/L ()   02/20/21  05:50    


 


Carbon Dioxide  30 mmol/L (22-30)   02/20/21  05:50    


 


Anion Gap  11 mmol/L  02/20/21  05:50    


 


BUN  26 mg/dL (9-20)  H  02/20/21  05:50    


 


Creatinine  0.5 mg/dL (0.8-1.3)  L  02/20/21  05:50    


 


Estimated GFR  > 60 ml/min  02/20/21  05:50    


 


BUN/Creatinine Ratio  52 %  02/20/21  05:50    


 


Glucose  88 mg/dL ()   02/20/21  05:50    


 


Lactic Acid  1.40 mmol/L (0.7-2.0)   02/16/21  04:51    


 


Calcium  7.7 mg/dL (8.4-10.2)  L  02/20/21  05:50    


 


Ferritin  333.2 ng/mL (30.0-300.0)  H  02/23/21  04:58    


 


Total Bilirubin  0.20 mg/dL (0.1-1.2)   02/20/21  05:50    


 


AST  64 units/L (5-40)  H  02/20/21  05:50    


 


ALT  46 units/L (7-56)   02/20/21  05:50    


 


Alkaline Phosphatase  64 units/L ()   02/20/21  05:50    


 


Lactate Dehydrogenase  382 units/L ()  H  02/23/21  04:58    


 


Troponin T  < 0.010 ng/mL (0.00-0.029)   02/15/21  23:28    


 


C-Reactive Protein  3.70 mg/dL (0.00-1.30)  H  02/23/21  04:58    


 


Total Protein  5.3 g/dL (6.3-8.2)  L  02/20/21  05:50    


 


Albumin  2.8 g/dL (3.9-5)  L  02/20/21  05:50    


 


Albumin/Globulin Ratio  1.1 %  02/20/21  05:50    


 


Procalcitonin  0.46 ng/mL (<0.15)   02/15/21  23:28    


 


TSH  0.573 mlU/mL (0.270-4.200)   02/16/21  13:51    


 


Free T4  1.21 ng/dL (0.76-1.46)   02/16/21  13:51    


 


Nasal Screen MRSA (PCR)  Negative  (Negative)   02/16/21  Unknown


 


Coronavirus (PCR)  Positive  (Negative)  A  02/16/21  10:18    











Godwin/IV: 


                                        





Voiding Method                   Bedside Commode











Active Medications





- Current Medications


Current Medications: 














Generic Name Dose Route Start Last Admin





  Trade Name Freq  PRN Reason Stop Dose Admin


 


Acetaminophen  650 mg  02/16/21 00:57 





  Acetaminophen 325 Mg Tab  PO  





  Q4H PRN  





  Pain MILD(1-3)/Fever >100.5/HA  


 


Ascorbic Acid  1,000 mg  02/18/21 10:00  02/24/21 09:34





  Ascorbic Acid 500 Mg Tab  PO   1,000 mg





  BID YRN   Administration


 


Cholecalciferol  5,000 unit  02/18/21 10:00  02/24/21 09:34





  Cholecalciferol (Vit D3) 5,000 Unit Tab  PO   5,000 unit





  DAILY YRN   Administration


 


Dexamethasone  6 mg  02/16/21 10:00  02/24/21 09:35





  Dexamethasone 4 Mg/Ml Vial  IV  02/25/21 10:01  6 mg





  Q24HR YRN   Administration


 


Enoxaparin Sodium  70 mg  02/16/21 11:30  02/24/21 09:34





  Enoxaparin 80 Mg/0.8 Ml Inj  SUB-Q   70 mg





  Q12HR YRN   Administration


 


Magnesium Hydroxide  30 ml  02/16/21 00:57 





  Magnesium Hydroxide (Mom) Oral Liqd Udc  PO  





  Q4H PRN  





  Constipation  


 


Morphine Sulfate  2 mg  02/16/21 00:57  02/22/21 22:14





  Morphine 2 Mg/1 Ml Inj  IV   2 mg





  Q4H PRN   Administration





  Pain, Moderate (4-6)  


 


Ondansetron HCl  4 mg  02/16/21 00:57 





  Ondansetron 4 Mg/2 Ml Inj  IV  





  Q8H PRN  





  Nausea And Vomiting  


 


Sodium Chloride  10 ml  02/16/21 10:00  02/24/21 09:46





  Sodium Chloride 0.9% 10 Ml Flush Syringe  IV   10 ml





  BID YRN   Administration


 


Sodium Chloride  10 ml  02/16/21 00:57 





  Sodium Chloride 0.9% 10 Ml Flush Syringe  IV  





  PRN PRN  





  LINE FLUSH  


 


Zinc Sulfate  220 mg  02/18/21 10:00  02/24/21 09:34





  Zinc Sulfate 220 Mg Cap  PO   220 mg





  QDAY YRN   Administration














Nutrition/Malnutrition Assess





- Dietary Evaluation


Nutrition/Malnutrition Findings: 


                                        





Nutrition Notes                                            Start:  02/16/21 

12:36


Freq:                                                      Status: Active       




Protocol:                                                                       




 Document     02/18/21 13:19  AT  (Rec: 02/18/21 13:28  AT  [id number])


 Co-Sign      02/18/21 13:19  MK


 Nutrition Notes


     Initial or Follow up                        Reassessment


     Current Diagnosis                           Sepsis,Respiratory Failure


     Other Pertinent Diagnosis                   COVID(+), SOB, Pneumonia


     Current Diet                                Regular Diet


     Labs/Tests                                  Na 136


                                                 BUN 25


                                                 Cr 0.6


     Pertinent Medications                       Zinc Sulfate


                                                 Vit D3


                                                 Vit C


                                                 Lasix


                                                 Decadron


     Height                                      5 ft 9 in


     Weight                                      68.039 kg


     Ideal Body Weight (kg)                      72.72


     BMI                                         22.1


     Intake Prior to Admission                   Poor


     Weight Status                               Appropriate


     Subjective/Other Information                Follow up for intakes, ONS


                                                 tolerance, BM. Was unable to


                                                 reach pt by the phone x2. Per


                                                 chart, pt is consuming 100% of


                                                 meals and ONS. Per RN, the pt


                                                 has no wounds, but has a


                                                 Vikash Score of 16. Per chart,


                                                 pt had a normal BM yesterday.


     Percent of energy/protein needs met:        100%/100%


     Burn                                        Absent


     Trauma                                      Absent


     GI Symptoms                                 None


     Food Allergy                                No


     Current % PO                                Good (%)


     Minimum of two criteria                     No


     Reduced  Strength                       Measurably Reduced (severe)


     #2


      Nutrition Diagnosis                        No nutrition diagnosis at this


                                                 time


     #1


      Nutrition Diagnosis                        Predicted suboptimal energy


                                                 intake


      As Evidenced by Signs and Symptoms         pt consuming 100% of meals and


                                                 ONS x2 days


      Diagnosis Progress(for reassessment        Resolved


       documentation)                            


     Is patient on ventilator?                   No


     Is Patient Ambulatory and/or Out of Bed     No


     REE-(Keller-St. Jeor-confined to bed)      [phone]


     Calculation Used for Recommendations        Keller-St Jeor


     Additional Notes                            PRO needs: 68-82g (1-1.2 g/kg)


                                                 Fluid needs: 1 mL/kcal or per


                                                 MD


 Nutrition Intervention


     Change Diet Order:                          Continue Regular Diet


     Add Supplement/Snack (indicate name/kcal    Ensure Enlive BID


      /protein )                                 


     Provides kCal:                              700


     Provides Protein (gm)                       40


     Goal #1                                     Maintain weight


     Goal #2                                     Meet at least 75% of estimated


                                                 energy and protein needs via


                                                 diet and ONS


     Anticipated Discharge Needs:                Regular diet


     Follow-Up By:                               02/25/21


     Additional Comments                         F/U for stable intakes

## 2021-02-24 NOTE — PROGRESS NOTE
Assessment and Plan





Agree with COVID precautions


Wean FiO2 for sats >88%


Agree with steroids, 10 days total.  Has finished Remdesivir


Prone as tolerated during the day and sleep prone at night.


Guarded prognosis.





Subjective


Date of service: 02/24/21


Interval history: 





Remains on 40 and 90%.  Sats still in the mid to high 90's.





Objective


                               Vital Signs - 12hr











  02/24/21 02/24/21





  03:05 06:13


 


Temperature  97.7 F


 


Pulse Rate  73


 


Respiratory  20





Rate  


 


Blood Pressure  99/60


 


O2 Sat by Pulse 96 93





Oximetry  











Constitutional: no acute distress, alert


Eyes: non-icteric


ENT: oropharynx moist


Neck: supple


Ascultation: Bilateral: diminished breath sounds


Cardiovascular: regular rate and rhythm


Gastrointestinal: normoactive bowel sounds, soft, non-tender


Integumentary: normal


Extremities: no cyanosis


Neurologic: normal mental status


CBC and BMP: 


                                 02/17/21 04:14





                                 02/20/21 05:50


ABG, PT/INR, D-dimer: 


PT/INR, D-dimer











PT  14.1 Sec. (12.2-14.9)   02/17/21  04:14    


 


INR  1.11  (0.87-1.13)   02/17/21  04:14    


 


D-Dimer  1125.93 ng/mlDDU (0-234)  H  02/23/21  04:58    








Abnormal lab findings: 


                                  Abnormal Labs











  02/15/21 02/15/21 02/15/21





  23:28 23:28 23:28


 


WBC   


 


Lymph % (Auto)  4.1 L  


 


Mono % (Auto)  7.8 H  


 


Lymph # (Auto)  0.3 L  


 


Seg Neutrophils %  86.9 H  


 


Seg Neutrophils #   


 


D-Dimer   6514.44 H 


 


Sodium    135 L


 


Chloride    95.2 L


 


Carbon Dioxide   


 


BUN   


 


Creatinine    0.7 L


 


Glucose   


 


Calcium   


 


Ferritin   


 


AST   


 


Lactate Dehydrogenase   


 


C-Reactive Protein   


 


Total Protein   


 


Albumin    3.0 L


 


Coronavirus (PCR)   














  02/15/21 02/15/21 02/16/21





  23:28 23:28 10:18


 


WBC   


 


Lymph % (Auto)   


 


Mono % (Auto)   


 


Lymph # (Auto)   


 


Seg Neutrophils %   


 


Seg Neutrophils #   


 


D-Dimer   


 


Sodium   


 


Chloride   


 


Carbon Dioxide   


 


BUN   


 


Creatinine   


 


Glucose   


 


Calcium   


 


Ferritin  737.7 H  


 


AST   


 


Lactate Dehydrogenase   636 H 


 


C-Reactive Protein   17.30 H 


 


Total Protein   


 


Albumin   


 


Coronavirus (PCR)    Positive A














  02/17/21 02/17/21 02/18/21





  04:14 04:14 05:19


 


WBC  11.9 H  


 


Lymph % (Auto)  5.4 L  


 


Mono % (Auto)   


 


Lymph # (Auto)  0.6 L  


 


Seg Neutrophils %  87.5 H  


 


Seg Neutrophils #  10.4 H  


 


D-Dimer   


 


Sodium    136 L


 


Chloride   


 


Carbon Dioxide   


 


BUN   27 H  25 H


 


Creatinine   0.7 L  0.6 L


 


Glucose   138 H 


 


Calcium   8.3 L  7.8 L


 


Ferritin   


 


AST   


 


Lactate Dehydrogenase   


 


C-Reactive Protein   


 


Total Protein    5.3 L


 


Albumin    2.9 L


 


Coronavirus (PCR)   














  02/19/21 02/19/21 02/19/21





  05:26 05:26 05:26


 


WBC   


 


Lymph % (Auto)   


 


Mono % (Auto)   


 


Lymph # (Auto)   


 


Seg Neutrophils %   


 


Seg Neutrophils #   


 


D-Dimer   5476.96 H 


 


Sodium   


 


Chloride   


 


Carbon Dioxide  31 H  


 


BUN  22 H  


 


Creatinine  0.5 L  


 


Glucose  117 H  


 


Calcium  7.9 L  


 


Ferritin    382.0 H


 


AST   


 


Lactate Dehydrogenase   


 


C-Reactive Protein   


 


Total Protein  5.8 L  


 


Albumin  2.7 L  


 


Coronavirus (PCR)   














  02/19/21 02/20/21 02/21/21





  05:26 05:50 05:41


 


WBC   


 


Lymph % (Auto)   


 


Mono % (Auto)   


 


Lymph # (Auto)   


 


Seg Neutrophils %   


 


Seg Neutrophils #   


 


D-Dimer    3581.74 H


 


Sodium   


 


Chloride   


 


Carbon Dioxide   


 


BUN   26 H 


 


Creatinine   0.5 L 


 


Glucose   


 


Calcium   7.7 L 


 


Ferritin   


 


AST   64 H 


 


Lactate Dehydrogenase  424 H  


 


C-Reactive Protein  3.10 H  


 


Total Protein   5.3 L 


 


Albumin   2.8 L 


 


Coronavirus (PCR)   














  02/21/21 02/21/21 02/23/21





  05:41 05:41 04:58


 


WBC   


 


Lymph % (Auto)   


 


Mono % (Auto)   


 


Lymph # (Auto)   


 


Seg Neutrophils %   


 


Seg Neutrophils #   


 


D-Dimer    1125.93 H


 


Sodium   


 


Chloride   


 


Carbon Dioxide   


 


BUN   


 


Creatinine   


 


Glucose   


 


Calcium   


 


Ferritin  357.0 H  


 


AST   


 


Lactate Dehydrogenase   478 H 


 


C-Reactive Protein   3.00 H 


 


Total Protein   


 


Albumin   


 


Coronavirus (PCR)   














  02/23/21 02/23/21





  04:58 04:58


 


WBC  


 


Lymph % (Auto)  


 


Mono % (Auto)  


 


Lymph # (Auto)  


 


Seg Neutrophils %  


 


Seg Neutrophils #  


 


D-Dimer  


 


Sodium  


 


Chloride  


 


Carbon Dioxide  


 


BUN  


 


Creatinine  


 


Glucose  


 


Calcium  


 


Ferritin  333.2 H 


 


AST  


 


Lactate Dehydrogenase   382 H


 


C-Reactive Protein   3.70 H


 


Total Protein  


 


Albumin  


 


Coronavirus (PCR)

## 2021-02-24 NOTE — PROGRESS NOTE
Assessment and Plan





Cultures:


Blood culture no growth so far





A/P: 65 man no past medical history admitted with COVID-19





#Covid pneumonia: Elevated procalcitonin as well.  Completed empiric antibiotics

and remdesivir.





#Acute hypoxic respiratory failure: Saturations down to the 80s on admission.  

On high flow nasal cannula.








Recs:


-Continue dexamethasone 6 mg every 24 hours to complete 10 days.


-Obtain inflammatory markers every 48-72 hours.


-Anticoagulation per hospital protocol


-Proning as able





Thank you for the consult, we will continue to follow.





ANDRES Horvath MD


[facility] ([facility])


O: [phone]


F: [phone]





 








Subjective


Date of service: 02/24/21


Interval history: 





Afebrile, remains on HFNC. 





Objective





- Exam


Narrative Exam: 





Physical exam deferred due to PPE conservation strategy.  Please refer to 

primary team's note.





- Constitutional


Vitals: 


                                   Vital Signs











Temp Pulse Resp BP Pulse Ox


 


 97.9 F   82   24   118/69   94 


 


 02/24/21 11:40  02/24/21 11:40  02/24/21 11:40  02/24/21 11:40  02/24/21 11:40








                           Temperature -Last 24 Hours











Temperature                    97.9 F


 


Temperature                    97.7 F


 


Temperature                    97.7 F

















- Labs


CBC & Chem 7: 


                                 02/17/21 04:14





                                 02/20/21 05:50

## 2021-02-25 RX ADMIN — Medication SCH ML: at 10:04

## 2021-02-25 RX ADMIN — OXYCODONE HYDROCHLORIDE AND ACETAMINOPHEN SCH MG: 500 TABLET ORAL at 10:04

## 2021-02-25 RX ADMIN — ENOXAPARIN SODIUM SCH MG: 100 INJECTION SUBCUTANEOUS at 10:04

## 2021-02-25 RX ADMIN — CHOLECALCIFEROL TAB 125 MCG (5000 UNIT) SCH UNIT: 125 TAB at 10:04

## 2021-02-25 RX ADMIN — Medication SCH ML: at 21:51

## 2021-02-25 RX ADMIN — Medication SCH MG: at 10:04

## 2021-02-25 RX ADMIN — OXYCODONE HYDROCHLORIDE AND ACETAMINOPHEN SCH MG: 500 TABLET ORAL at 21:51

## 2021-02-25 RX ADMIN — ENOXAPARIN SODIUM SCH MG: 100 INJECTION SUBCUTANEOUS at 21:51

## 2021-02-25 NOTE — PROGRESS NOTE
Assessment and Plan





Agree with COVID precautions


Wean FiO2 for sats >88%


Agree with steroids, 10 days total.  Has finished Remdesivir


Prone as tolerated during the day and sleep prone at night.


Guarded prognosis.





Subjective


Date of service: 02/25/21


Interval history: 





No acute events.  Still on HFNC with same sats.  





Objective


                               Vital Signs - 12hr











  02/25/21 02/25/21 02/25/21





  03:44 04:54 10:05


 


Temperature  97.4 F L 


 


Pulse Rate  67 


 


Respiratory  20 





Rate   


 


Blood Pressure  109/68 


 


O2 Sat by Pulse 93 98 94





Oximetry   











Constitutional: no acute distress, alert


Eyes: non-icteric


ENT: oropharynx moist


Neck: supple


Ascultation: Bilateral: diminished breath sounds


Cardiovascular: regular rate and rhythm


Gastrointestinal: normoactive bowel sounds, soft, non-tender


Integumentary: normal


Extremities: no cyanosis


Neurologic: normal mental status


CBC and BMP: 


                                 02/17/21 04:14





                                 02/20/21 05:50


ABG, PT/INR, D-dimer: 


PT/INR, D-dimer











PT  14.1 Sec. (12.2-14.9)   02/17/21  04:14    


 


INR  1.11  (0.87-1.13)   02/17/21  04:14    


 


D-Dimer  1125.93 ng/mlDDU (0-234)  H  02/23/21  04:58    








Abnormal lab findings: 


                                  Abnormal Labs











  02/15/21 02/15/21 02/15/21





  23:28 23:28 23:28


 


WBC   


 


Lymph % (Auto)  4.1 L  


 


Mono % (Auto)  7.8 H  


 


Lymph # (Auto)  0.3 L  


 


Seg Neutrophils %  86.9 H  


 


Seg Neutrophils #   


 


D-Dimer   6514.44 H 


 


Sodium    135 L


 


Chloride    95.2 L


 


Carbon Dioxide   


 


BUN   


 


Creatinine    0.7 L


 


Glucose   


 


Calcium   


 


Ferritin   


 


AST   


 


Lactate Dehydrogenase   


 


C-Reactive Protein   


 


Total Protein   


 


Albumin    3.0 L


 


Coronavirus (PCR)   














  02/15/21 02/15/21 02/16/21





  23:28 23:28 10:18


 


WBC   


 


Lymph % (Auto)   


 


Mono % (Auto)   


 


Lymph # (Auto)   


 


Seg Neutrophils %   


 


Seg Neutrophils #   


 


D-Dimer   


 


Sodium   


 


Chloride   


 


Carbon Dioxide   


 


BUN   


 


Creatinine   


 


Glucose   


 


Calcium   


 


Ferritin  737.7 H  


 


AST   


 


Lactate Dehydrogenase   636 H 


 


C-Reactive Protein   17.30 H 


 


Total Protein   


 


Albumin   


 


Coronavirus (PCR)    Positive A














  02/17/21 02/17/21 02/18/21





  04:14 04:14 05:19


 


WBC  11.9 H  


 


Lymph % (Auto)  5.4 L  


 


Mono % (Auto)   


 


Lymph # (Auto)  0.6 L  


 


Seg Neutrophils %  87.5 H  


 


Seg Neutrophils #  10.4 H  


 


D-Dimer   


 


Sodium    136 L


 


Chloride   


 


Carbon Dioxide   


 


BUN   27 H  25 H


 


Creatinine   0.7 L  0.6 L


 


Glucose   138 H 


 


Calcium   8.3 L  7.8 L


 


Ferritin   


 


AST   


 


Lactate Dehydrogenase   


 


C-Reactive Protein   


 


Total Protein    5.3 L


 


Albumin    2.9 L


 


Coronavirus (PCR)   














  02/19/21 02/19/21 02/19/21





  05:26 05:26 05:26


 


WBC   


 


Lymph % (Auto)   


 


Mono % (Auto)   


 


Lymph # (Auto)   


 


Seg Neutrophils %   


 


Seg Neutrophils #   


 


D-Dimer   5476.96 H 


 


Sodium   


 


Chloride   


 


Carbon Dioxide  31 H  


 


BUN  22 H  


 


Creatinine  0.5 L  


 


Glucose  117 H  


 


Calcium  7.9 L  


 


Ferritin    382.0 H


 


AST   


 


Lactate Dehydrogenase   


 


C-Reactive Protein   


 


Total Protein  5.8 L  


 


Albumin  2.7 L  


 


Coronavirus (PCR)   














  02/19/21 02/20/21 02/21/21





  05:26 05:50 05:41


 


WBC   


 


Lymph % (Auto)   


 


Mono % (Auto)   


 


Lymph # (Auto)   


 


Seg Neutrophils %   


 


Seg Neutrophils #   


 


D-Dimer    3581.74 H


 


Sodium   


 


Chloride   


 


Carbon Dioxide   


 


BUN   26 H 


 


Creatinine   0.5 L 


 


Glucose   


 


Calcium   7.7 L 


 


Ferritin   


 


AST   64 H 


 


Lactate Dehydrogenase  424 H  


 


C-Reactive Protein  3.10 H  


 


Total Protein   5.3 L 


 


Albumin   2.8 L 


 


Coronavirus (PCR)   














  02/21/21 02/21/21 02/23/21





  05:41 05:41 04:58


 


WBC   


 


Lymph % (Auto)   


 


Mono % (Auto)   


 


Lymph # (Auto)   


 


Seg Neutrophils %   


 


Seg Neutrophils #   


 


D-Dimer    1125.93 H


 


Sodium   


 


Chloride   


 


Carbon Dioxide   


 


BUN   


 


Creatinine   


 


Glucose   


 


Calcium   


 


Ferritin  357.0 H  


 


AST   


 


Lactate Dehydrogenase   478 H 


 


C-Reactive Protein   3.00 H 


 


Total Protein   


 


Albumin   


 


Coronavirus (PCR)   














  02/23/21 02/23/21





  04:58 04:58


 


WBC  


 


Lymph % (Auto)  


 


Mono % (Auto)  


 


Lymph # (Auto)  


 


Seg Neutrophils %  


 


Seg Neutrophils #  


 


D-Dimer  


 


Sodium  


 


Chloride  


 


Carbon Dioxide  


 


BUN  


 


Creatinine  


 


Glucose  


 


Calcium  


 


Ferritin  333.2 H 


 


AST  


 


Lactate Dehydrogenase   382 H


 


C-Reactive Protein   3.70 H


 


Total Protein  


 


Albumin  


 


Coronavirus (PCR)

## 2021-02-25 NOTE — PROGRESS NOTE
Assessment and Plan





Cultures:


Blood culture no growth so far





A/P: 65 man no past medical history admitted with COVID-19





#Covid pneumonia: Elevated procalcitonin as well.  Completed empiric antibiotics

and remdesivir.





#Acute hypoxic respiratory failure: Saturations down to the 80s on admission.  

On high flow nasal cannula.








Recs:


-Continue dexamethasone 6 mg every 24 hours to complete 10 days.


-Obtain inflammatory markers every 48-72 hours.


-Anticoagulation per hospital protocol


-Proning as able





Thank you for the consult, we will sign off.  Please call with any questions.





ANDRES Horvath MD


[facility] ([facility])


O: [phone]


F: [phone]





 








Subjective


Date of service: 02/25/21


Interval history: 





Afebrile, no acute change.  Currently on hyponasal cannula.





Objective





- Exam


Narrative Exam: 





Physical exam deferred due to PPE conservation strategy.  Please refer to 

primary team's note.





- Constitutional


Vitals: 


                                   Vital Signs











Temp Pulse Resp BP Pulse Ox


 


 97.4 F L  67   20   109/68   93 


 


 02/25/21 04:54  02/25/21 04:54  02/25/21 04:54  02/25/21 04:54  02/25/21 14:00








                           Temperature -Last 24 Hours











Temperature                    97.4 F


 


Temperature                    98.9 F

















- Labs


CBC & Chem 7: 


                                 02/17/21 04:14





                                 02/20/21 05:50

## 2021-02-25 NOTE — PROGRESS NOTE
Assessment and Plan


Assessment and plan: 


--Severe hypoxia secondary to COVID-19 pneumonia


Current Visit: Yes   Status: Acute   


Plan to address problem: 


 Requiring very high flow nasal cannula oxygen at 40 L


Wean as tolerated, maintain O2 sats to more than 88 to 90%


Continue supportive care, pulmonary following





--2019 novel coronavirus infection


Current Visit: Yes   Status: Acute   


Plan to address problem: 


Contact and droplet isolation


Management of COVID-19 per protocols


Completed empiric antibiotics and remdesivir


Completed dexamethasone 10 days last dose today. 


Prone position as tolerated


Home oxygen evaluation at discharge





-- Pneumonia


Current Visit: Yes   Status: Acute   


Plan to address problem: 


Completed empiric antibiotics, cultures negative to date





--Sepsis secondary to pneumonia


Current Visit: Yes   Status: Acute   


Plan to address problem: 


Completed antibiotics.  Supportive care





--DVT bilateral lower extremity


Current Visit: Yes   Status: Acute   


Plan to address problem: 


On full anticoagulation





--Full code status


Current Visit: Yes   Status: Acute   


Plan to address problem: 


Patient is a full code.





We will closely monitor the patient and adjust management as needed


Plan of care reviewed with the patient and his nurse











History


Interval history: 


I have seen and examined the patient at the bedside this morning


Strict isolation precautions, PPE protocols, and Covid 19 guidelines followed


Patient feels better anxious to go home


However patient still requiring high flow oxygen at 40 L


Vital signs noted











Hospitalist Physical





- Constitutional


Vitals: 


                                        











Temp Pulse Resp BP Pulse Ox


 


 97.4 F L  67   20   109/68   94 


 


 02/25/21 04:54  02/25/21 04:54  02/25/21 04:54  02/25/21 04:54  02/25/21 10:05











General appearance: Present: no acute distress, well-nourished





- EENT


Eyes: Present: PERRL, EOM intact





- Neck


Neck: Present: supple, normal ROM





- Respiratory


Respiratory effort: normal


Respiratory: bilateral: diminished, negative: rales, rhonchi, wheezing





- Cardiovascular


Rhythm: regular


Heart Sounds: Present: S1 & S2





- Extremities


Extremities: no ischemia, No edema





- Abdominal


General gastrointestinal: soft, non-tender, non-distended, normal bowel sounds





- Integumentary


Integumentary: Present: clear, warm





- Psychiatric


Psychiatric: appropriate mood/affect, cooperative





- Neurologic


Neurologic: CNII-XII intact, moves all extremities





HEART Score





- HEART Score


Troponin: 


                                        











Troponin T  < 0.010 ng/mL (0.00-0.029)   02/15/21  23:28    














Results





- Labs


CBC & Chem 7: 


                                 02/17/21 04:14





                                 02/20/21 05:50


Labs: 


                             Laboratory Last Values











WBC  11.9 K/mm3 (4.5-11.0)  H  02/17/21  04:14    


 


RBC  4.28 M/mm3 (3.65-5.03)   02/17/21  04:14    


 


Hgb  13.3 gm/dl (11.8-15.2)   02/17/21  04:14    


 


Hct  40.1 % (35.5-45.6)   02/17/21  04:14    


 


MCV  94 fl (84-94)   02/17/21  04:14    


 


MCH  31 pg (28-32)   02/17/21  04:14    


 


MCHC  33 % (32-34)   02/17/21  04:14    


 


RDW  14.1 % (13.2-15.2)   02/17/21  04:14    


 


Plt Count  220 K/mm3 (140-440)   02/17/21  04:14    


 


Lymph % (Auto)  5.4 % (13.4-35.0)  L  02/17/21  04:14    


 


Mono % (Auto)  6.9 % (0.0-7.3)   02/17/21  04:14    


 


Eos % (Auto)  0.0 % (0.0-4.3)   02/17/21  04:14    


 


Baso % (Auto)  0.2 % (0.0-1.8)   02/17/21  04:14    


 


Lymph # (Auto)  0.6 K/mm3 (1.2-5.4)  L  02/17/21  04:14    


 


Mono # (Auto)  0.8 K/mm3 (0.0-0.8)   02/17/21  04:14    


 


Eos # (Auto)  0.0 K/mm3 (0.0-0.4)   02/17/21  04:14    


 


Baso # (Auto)  0.0 K/mm3 (0.0-0.1)   02/17/21  04:14    


 


Seg Neutrophils %  87.5 % (40.0-70.0)  H  02/17/21  04:14    


 


Seg Neutrophils #  10.4 K/mm3 (1.8-7.7)  H  02/17/21  04:14    


 


PT  14.1 Sec. (12.2-14.9)   02/17/21  04:14    


 


INR  1.11  (0.87-1.13)   02/17/21  04:14    


 


APTT  26.5 Sec. (24.2-36.6)   02/15/21  23:28    


 


D-Dimer  1125.93 ng/mlDDU (0-234)  H  02/23/21  04:58    


 


Sodium  140 mmol/L (137-145)   02/20/21  05:50    


 


Potassium  4.7 mmol/L (3.6-5.0)   02/20/21  05:50    


 


Chloride  103.4 mmol/L ()   02/20/21  05:50    


 


Carbon Dioxide  30 mmol/L (22-30)   02/20/21  05:50    


 


Anion Gap  11 mmol/L  02/20/21  05:50    


 


BUN  26 mg/dL (9-20)  H  02/20/21  05:50    


 


Creatinine  0.5 mg/dL (0.8-1.3)  L  02/20/21  05:50    


 


Estimated GFR  > 60 ml/min  02/20/21  05:50    


 


BUN/Creatinine Ratio  52 %  02/20/21  05:50    


 


Glucose  88 mg/dL ()   02/20/21  05:50    


 


Lactic Acid  1.40 mmol/L (0.7-2.0)   02/16/21  04:51    


 


Calcium  7.7 mg/dL (8.4-10.2)  L  02/20/21  05:50    


 


Ferritin  333.2 ng/mL (30.0-300.0)  H  02/23/21  04:58    


 


Total Bilirubin  0.20 mg/dL (0.1-1.2)   02/20/21  05:50    


 


AST  64 units/L (5-40)  H  02/20/21  05:50    


 


ALT  46 units/L (7-56)   02/20/21  05:50    


 


Alkaline Phosphatase  64 units/L ()   02/20/21  05:50    


 


Lactate Dehydrogenase  382 units/L ()  H  02/23/21  04:58    


 


Troponin T  < 0.010 ng/mL (0.00-0.029)   02/15/21  23:28    


 


C-Reactive Protein  3.70 mg/dL (0.00-1.30)  H  02/23/21  04:58    


 


Total Protein  5.3 g/dL (6.3-8.2)  L  02/20/21  05:50    


 


Albumin  2.8 g/dL (3.9-5)  L  02/20/21  05:50    


 


Albumin/Globulin Ratio  1.1 %  02/20/21  05:50    


 


Procalcitonin  0.46 ng/mL (<0.15)   02/15/21  23:28    


 


TSH  0.573 mlU/mL (0.270-4.200)   02/16/21  13:51    


 


Free T4  1.21 ng/dL (0.76-1.46)   02/16/21  13:51    


 


Nasal Screen MRSA (PCR)  Negative  (Negative)   02/16/21  Unknown


 


Coronavirus (PCR)  Positive  (Negative)  A  02/16/21  10:18    











Godwin/IV: 


                                        





Voiding Method                   Urinal











Active Medications





- Current Medications


Current Medications: 














Generic Name Dose Route Start Last Admin





  Trade Name Freq  PRN Reason Stop Dose Admin


 


Acetaminophen  650 mg  02/16/21 00:57 





  Acetaminophen 325 Mg Tab  PO  





  Q4H PRN  





  Pain MILD(1-3)/Fever >100.5/HA  


 


Ascorbic Acid  1,000 mg  02/18/21 10:00  02/25/21 10:04





  Ascorbic Acid 500 Mg Tab  PO   1,000 mg





  BID YRN   Administration


 


Cholecalciferol  5,000 unit  02/18/21 10:00  02/25/21 10:04





  Cholecalciferol (Vit D3) 5,000 Unit Tab  PO   5,000 unit





  DAILY YRN   Administration


 


Enoxaparin Sodium  70 mg  02/16/21 11:30  02/25/21 10:04





  Enoxaparin 80 Mg/0.8 Ml Inj  SUB-Q   70 mg





  Q12HR YRN   Administration


 


Magnesium Hydroxide  30 ml  02/16/21 00:57 





  Magnesium Hydroxide (Mom) Oral Liqd Udc  PO  





  Q4H PRN  





  Constipation  


 


Morphine Sulfate  2 mg  02/16/21 00:57  02/22/21 22:14





  Morphine 2 Mg/1 Ml Inj  IV   2 mg





  Q4H PRN   Administration





  Pain, Moderate (4-6)  


 


Ondansetron HCl  4 mg  02/16/21 00:57 





  Ondansetron 4 Mg/2 Ml Inj  IV  





  Q8H PRN  





  Nausea And Vomiting  


 


Sodium Chloride  10 ml  02/16/21 10:00  02/25/21 10:04





  Sodium Chloride 0.9% 10 Ml Flush Syringe  IV   10 ml





  BID YRN   Administration


 


Sodium Chloride  10 ml  02/16/21 00:57 





  Sodium Chloride 0.9% 10 Ml Flush Syringe  IV  





  PRN PRN  





  LINE FLUSH  


 


Zinc Sulfate  220 mg  02/18/21 10:00  02/25/21 10:04





  Zinc Sulfate 220 Mg Cap  PO   220 mg





  QDAY YRN   Administration














Nutrition/Malnutrition Assess





- Dietary Evaluation


Nutrition/Malnutrition Findings: 


                                        





Nutrition Notes                                            Start:  02/16/21 

12:36


Freq:                                                      Status: Active       




Protocol:                                                                       




 Document     02/25/21 10:32  [email or web address]  (Rec: 02/25/21 10:42  [email or web address]  [id number])


 Co-Sign      02/25/21 10:32  


 Nutrition Notes


     Initial or Follow up                        Reassessment


     Current Diagnosis                           Sepsis,Respiratory Failure


     Other Pertinent Diagnosis                   COVID(+), SOB, Pneumonia


     Current Diet                                Regular Diet


     Labs/Tests                                  Reviewed


     Pertinent Medications                       Zinc Sulfate


                                                 Vit D3


                                                 Vit C


     Height                                      5 ft 9 in


     Weight                                      68 kg


     Ideal Body Weight (kg)                      72.72


     BMI                                         22.1


     Intake Prior to Admission                   Poor


     Weight Status                               Appropriate


     Subjective/Other Information                F/U for stable intakes. Spoke


                                                 with pt on the phone. Pt


                                                 consuming 100% of PO intakes


                                                 and drinking ONS. Pt did state


                                                 that he was confused and kept


                                                 asking what he should be


                                                 doing.


     Percent of energy/protein needs met:        100%/100%


     Burn                                        Absent


     Trauma                                      Absent


     GI Symptoms                                 None


     Food Allergy                                No


     Current % PO                                Good (%)


     Minimum of two criteria                     No


     Reduced  Strength                       N/A (non-severe)


     #2


      Nutrition Diagnosis                        No nutrition diagnosis at this


                                                 time


     Is patient on ventilator?                   No


     Is Patient Ambulatory and/or Out of Bed     No


     [facility]      0216.905


     Calculation Used for Recommendations        [facility]


     Additional Notes                            PRO needs: 68-82g (1-1.2 g/kg)


                                                 Fluid needs: 1 mL/kcal or per


                                                 MD


 Nutrition Intervention


     Change Diet Order:                          Continue Regular Diet


     Add Supplement/Snack (indicate name/kcal    Ensure Enlive BID


      /protein )                                 


     Provides kCal:                              700


     Provides Protein (gm)                       40


     Goal #1                                     Maintain weight


     Goal #2                                     Meet at least 75% of estimated


                                                 energy and protein needs via


                                                 diet and ONS


     Anticipated Discharge Needs:                Regular diet


     Follow-Up By:                               03/03/21


     Additional Comments                         F/U for stable intakes

## 2021-02-26 RX ADMIN — OXYCODONE HYDROCHLORIDE AND ACETAMINOPHEN SCH MG: 500 TABLET ORAL at 09:38

## 2021-02-26 RX ADMIN — ENOXAPARIN SODIUM SCH MG: 100 INJECTION SUBCUTANEOUS at 22:15

## 2021-02-26 RX ADMIN — OXYCODONE HYDROCHLORIDE AND ACETAMINOPHEN SCH MG: 500 TABLET ORAL at 22:15

## 2021-02-26 RX ADMIN — Medication SCH MG: at 09:38

## 2021-02-26 RX ADMIN — ENOXAPARIN SODIUM SCH MG: 100 INJECTION SUBCUTANEOUS at 09:38

## 2021-02-26 RX ADMIN — CHOLECALCIFEROL TAB 125 MCG (5000 UNIT) SCH UNIT: 125 TAB at 09:38

## 2021-02-26 RX ADMIN — Medication SCH ML: at 22:15

## 2021-02-26 RX ADMIN — Medication SCH ML: at 09:39

## 2021-02-26 NOTE — PROGRESS NOTE
Assessment and Plan


Assessment and plan: 


--Severe hypoxia secondary to COVID-19 pneumonia


Current Visit: Yes   Status: Acute   


Plan to address problem: 


 Requiring very high flow nasal cannula oxygen at 40 L


Wean as tolerated, maintain O2 sats to more than 88 to 90%


Continue supportive care, pulmonary following





--2019 novel coronavirus infection


Current Visit: Yes   Status: Acute   


Plan to address problem: 


Contact and droplet isolation


Management of COVID-19 per protocols


Completed empiric antibiotics and remdesivir


Completed dexamethasone 10 days last dose today. 


Prone position as tolerated


Home oxygen evaluation at discharge





-- Pneumonia


Current Visit: Yes   Status: Acute   


Plan to address problem: 


Completed empiric antibiotics, cultures negative to date





--Sepsis secondary to pneumonia


Current Visit: Yes   Status: Acute   


Plan to address problem: 


Completed antibiotics.  Supportive care





--DVT bilateral lower extremity


Current Visit: Yes   Status: Acute   


Plan to address problem: 


On full anticoagulation





--Full code status


Current Visit: Yes   Status: Acute   


Plan to address problem: 


Patient is a full code.





We will closely monitor the patient and adjust management as needed


Plan of care reviewed with the patient and his nurse








2/17: Patient continues on high flow oxygen.  Coronavirus testing came back 

positive.  Patient is on steroids awaiting ID for remdesivir treatment 

initiation.  He is also noted to have acute bilateral DVT and with the severity 

of his illness I anticipate that he probably has pulmonary embolism.  We will 

continue full anticoagulation at this time.  Pulmonologist input is and ID input

appreciated.  We will add some vitamin supplementation and will evaluate if diu

resis is needed.  Okay for patient to start diet, showing the patient was n.p.o.

yesterday.  Plan has been discussed with the patient and also the nursing staff 

and the officer on duty.





2/18: Remains on High flow oxygen 40L/100%. Prognosis poor. Prone as tolerated, 

will add some vitamins, give a dose of lasix. Updated the long term physician Dr Sy. Will discuss with Pulmonary if we should proceed with upgrade to the 

IM. Give a dose of lasix





2/19: Late entry for February 19 continue supportive care.  Continue to 

encourage prone position.  Wean oxygen as tolerated.  Continue remdesivir





2/21: Patient seen and examined clinically stable.  Still on high flow down to 

90%.  Saturating 91%.  Continue to encourage prone position continue steroids 

and remdesivir wean as tolerated. Advised again of diagnosis. Continue to use IS





2/22: Continue supportive care, wean as tolerated, Prone as tolerated, continue 

steroids and anticoagulation per hospital policy





2/25: Patient remains on high flow nasal cannula oxygen


Mild improvement, wean as tolerated, home O2 evaluation





2/26; patient anxious to go home, however continues to require high flow oxygen,

home O2 evaluation titrate as tolerated





History


Interval history: 


I have seen and examined the patient at the bedside


Isolation precautions followed per Covid 19 protocol sent guidelines


Patient feels slightly better however


Continues to require high flow oxygen


Mild distress


Vital signs noted








Hospitalist Physical





- Constitutional


Vitals: 


                                        











Temp Pulse Resp BP Pulse Ox


 


 98.1 F   106 H  24   111/71   91 


 


 02/26/21 18:18  02/26/21 18:18  02/26/21 18:18  02/26/21 18:18  02/26/21 18:18











General appearance: Present: no acute distress, well-nourished





- EENT


Eyes: Present: PERRL, EOM intact





- Neck


Neck: Present: supple, normal ROM





- Respiratory


Respiratory effort: normal


Respiratory: bilateral: diminished, negative: rales, rhonchi, wheezing





- Cardiovascular


Rhythm: regular


Heart Sounds: Present: S1 & S2





- Extremities


Extremities: no ischemia, No edema





- Abdominal


General gastrointestinal: soft, non-tender, non-distended





- Integumentary


Integumentary: Present: clear, warm





- Psychiatric


Psychiatric: appropriate mood/affect, cooperative





- Neurologic


Neurologic: moves all extremities





HEART Score





- HEART Score


Troponin: 


                                        











Troponin T  < 0.010 ng/mL (0.00-0.029)   02/15/21  23:28    














Results





- Labs


CBC & Chem 7: 


                                 02/17/21 04:14





                                 02/20/21 05:50


Labs: 


                             Laboratory Last Values











WBC  11.9 K/mm3 (4.5-11.0)  H  02/17/21  04:14    


 


RBC  4.28 M/mm3 (3.65-5.03)   02/17/21  04:14    


 


Hgb  13.3 gm/dl (11.8-15.2)   02/17/21  04:14    


 


Hct  40.1 % (35.5-45.6)   02/17/21  04:14    


 


MCV  94 fl (84-94)   02/17/21  04:14    


 


MCH  31 pg (28-32)   02/17/21  04:14    


 


MCHC  33 % (32-34)   02/17/21  04:14    


 


RDW  14.1 % (13.2-15.2)   02/17/21  04:14    


 


Plt Count  220 K/mm3 (140-440)   02/17/21  04:14    


 


Lymph % (Auto)  5.4 % (13.4-35.0)  L  02/17/21  04:14    


 


Mono % (Auto)  6.9 % (0.0-7.3)   02/17/21  04:14    


 


Eos % (Auto)  0.0 % (0.0-4.3)   02/17/21  04:14    


 


Baso % (Auto)  0.2 % (0.0-1.8)   02/17/21  04:14    


 


Lymph # (Auto)  0.6 K/mm3 (1.2-5.4)  L  02/17/21  04:14    


 


Mono # (Auto)  0.8 K/mm3 (0.0-0.8)   02/17/21  04:14    


 


Eos # (Auto)  0.0 K/mm3 (0.0-0.4)   02/17/21  04:14    


 


Baso # (Auto)  0.0 K/mm3 (0.0-0.1)   02/17/21  04:14    


 


Seg Neutrophils %  87.5 % (40.0-70.0)  H  02/17/21  04:14    


 


Seg Neutrophils #  10.4 K/mm3 (1.8-7.7)  H  02/17/21  04:14    


 


PT  14.1 Sec. (12.2-14.9)   02/17/21  04:14    


 


INR  1.11  (0.87-1.13)   02/17/21  04:14    


 


APTT  26.5 Sec. (24.2-36.6)   02/15/21  23:28    


 


D-Dimer  1125.93 ng/mlDDU (0-234)  H  02/23/21  04:58    


 


Sodium  140 mmol/L (137-145)   02/20/21  05:50    


 


Potassium  4.7 mmol/L (3.6-5.0)   02/20/21  05:50    


 


Chloride  103.4 mmol/L ()   02/20/21  05:50    


 


Carbon Dioxide  30 mmol/L (22-30)   02/20/21  05:50    


 


Anion Gap  11 mmol/L  02/20/21  05:50    


 


BUN  26 mg/dL (9-20)  H  02/20/21  05:50    


 


Creatinine  0.5 mg/dL (0.8-1.3)  L  02/20/21  05:50    


 


Estimated GFR  > 60 ml/min  02/20/21  05:50    


 


BUN/Creatinine Ratio  52 %  02/20/21  05:50    


 


Glucose  88 mg/dL ()   02/20/21  05:50    


 


Lactic Acid  1.40 mmol/L (0.7-2.0)   02/16/21  04:51    


 


Calcium  7.7 mg/dL (8.4-10.2)  L  02/20/21  05:50    


 


Ferritin  333.2 ng/mL (30.0-300.0)  H  02/23/21  04:58    


 


Total Bilirubin  0.20 mg/dL (0.1-1.2)   02/20/21  05:50    


 


AST  64 units/L (5-40)  H  02/20/21  05:50    


 


ALT  46 units/L (7-56)   02/20/21  05:50    


 


Alkaline Phosphatase  64 units/L ()   02/20/21  05:50    


 


Lactate Dehydrogenase  382 units/L ()  H  02/23/21  04:58    


 


Troponin T  < 0.010 ng/mL (0.00-0.029)   02/15/21  23:28    


 


C-Reactive Protein  3.70 mg/dL (0.00-1.30)  H  02/23/21  04:58    


 


Total Protein  5.3 g/dL (6.3-8.2)  L  02/20/21  05:50    


 


Albumin  2.8 g/dL (3.9-5)  L  02/20/21  05:50    


 


Albumin/Globulin Ratio  1.1 %  02/20/21  05:50    


 


Procalcitonin  0.46 ng/mL (<0.15)   02/15/21  23:28    


 


TSH  0.573 mlU/mL (0.270-4.200)   02/16/21  13:51    


 


Free T4  1.21 ng/dL (0.76-1.46)   02/16/21  13:51    


 


Nasal Screen MRSA (PCR)  Negative  (Negative)   02/16/21  Unknown


 


Coronavirus (PCR)  Positive  (Negative)  A  02/16/21  10:18    











Godwin/IV: 


                                        





Voiding Method                   Urinal











Active Medications





- Current Medications


Current Medications: 














Generic Name Dose Route Start Last Admin





  Trade Name Freq  PRN Reason Stop Dose Admin


 


Acetaminophen  650 mg  02/16/21 00:57 





  Acetaminophen 325 Mg Tab  PO  





  Q4H PRN  





  Pain MILD(1-3)/Fever >100.5/HA  


 


Ascorbic Acid  1,000 mg  02/18/21 10:00  02/26/21 09:38





  Ascorbic Acid 500 Mg Tab  PO   1,000 mg





  BID YRN   Administration


 


Cholecalciferol  5,000 unit  02/18/21 10:00  02/26/21 09:38





  Cholecalciferol (Vit D3) 5,000 Unit Tab  PO   5,000 unit





  DAILY YRN   Administration


 


Enoxaparin Sodium  70 mg  02/16/21 11:30  02/26/21 09:38





  Enoxaparin 80 Mg/0.8 Ml Inj  SUB-Q   70 mg





  Q12HR YRN   Administration


 


Magnesium Hydroxide  30 ml  02/16/21 00:57 





  Magnesium Hydroxide (Mom) Oral Liqd Udc  PO  





  Q4H PRN  





  Constipation  


 


Morphine Sulfate  2 mg  02/16/21 00:57  02/22/21 22:14





  Morphine 2 Mg/1 Ml Inj  IV   2 mg





  Q4H PRN   Administration





  Pain, Moderate (4-6)  


 


Ondansetron HCl  4 mg  02/16/21 00:57 





  Ondansetron 4 Mg/2 Ml Inj  IV  





  Q8H PRN  





  Nausea And Vomiting  


 


Sodium Chloride  10 ml  02/16/21 10:00  02/26/21 09:39





  Sodium Chloride 0.9% 10 Ml Flush Syringe  IV   10 ml





  BID YRN   Administration


 


Sodium Chloride  10 ml  02/16/21 00:57 





  Sodium Chloride 0.9% 10 Ml Flush Syringe  IV  





  PRN PRN  





  LINE FLUSH  


 


Zinc Sulfate  220 mg  02/18/21 10:00  02/26/21 09:38





  Zinc Sulfate 220 Mg Cap  PO   220 mg





  QDAY YRN   Administration














Nutrition/Malnutrition Assess





- Dietary Evaluation


Nutrition/Malnutrition Findings: 


                                        





Nutrition Notes                                            Start:  02/16/21 

12:36


Freq:                                                      Status: Active       




Protocol:                                                                       




 Document     02/25/21 10:32  [email or web address]  (Rec: 02/25/21 10:42  [email or web address]  [id number])


 Co-Sign      02/25/21 10:32  


 Nutrition Notes


     Initial or Follow up                        Reassessment


     Current Diagnosis                           Sepsis,Respiratory Failure


     Other Pertinent Diagnosis                   COVID(+), SOB, Pneumonia


     Current Diet                                Regular Diet


     Labs/Tests                                  Reviewed


     Pertinent Medications                       Zinc Sulfate


                                                 Vit D3


                                                 Vit C


     Height                                      5 ft 9 in


     Weight                                      68 kg


     Ideal Body Weight (kg)                      72.72


     BMI                                         22.1


     Intake Prior to Admission                   Poor


     Weight Status                               Appropriate


     Subjective/Other Information                F/U for stable intakes. Spoke


                                                 with pt on the phone. Pt


                                                 consuming 100% of PO intakes


                                                 and drinking ONS. Pt did state


                                                 that he was confused and kept


                                                 asking what he should be


                                                 doing.


     Percent of energy/protein needs met:        100%/100%


     Burn                                        Absent


     Trauma                                      Absent


     GI Symptoms                                 None


     Food Allergy                                No


     Current % PO                                Good (%)


     Minimum of two criteria                     No


     Reduced  Strength                       N/A (non-severe)


     #2


      Nutrition Diagnosis                        No nutrition diagnosis at this


                                                 time


     Is patient on ventilator?                   No


     Is Patient Ambulatory and/or Out of Bed     No


     REE-([facility]-confined to bed)      [phone]


     Calculation Used for Recommendations        [facility]


     Additional Notes                            PRO needs: 68-82g (1-1.2 g/kg)


                                                 Fluid needs: 1 mL/kcal or per


                                                 MD


 Nutrition Intervention


     Change Diet Order:                          Continue Regular Diet


     Add Supplement/Snack (indicate name/kcal    Ensure Enlive BID


      /protein )                                 


     Provides kCal:                              700


     Provides Protein (gm)                       40


     Goal #1                                     Maintain weight


     Goal #2                                     Meet at least 75% of estimated


                                                 energy and protein needs via


                                                 diet and ONS


     Anticipated Discharge Needs:                Regular diet


     Follow-Up By:                               03/03/21


     Additional Comments                         F/U for stable intakes

## 2021-02-27 RX ADMIN — OXYCODONE HYDROCHLORIDE AND ACETAMINOPHEN SCH MG: 500 TABLET ORAL at 09:53

## 2021-02-27 RX ADMIN — OXYCODONE HYDROCHLORIDE AND ACETAMINOPHEN SCH MG: 500 TABLET ORAL at 22:19

## 2021-02-27 RX ADMIN — Medication SCH ML: at 09:53

## 2021-02-27 RX ADMIN — TEMAZEPAM PRN MG: 15 CAPSULE ORAL at 22:19

## 2021-02-27 RX ADMIN — ENOXAPARIN SODIUM SCH MG: 100 INJECTION SUBCUTANEOUS at 09:52

## 2021-02-27 RX ADMIN — Medication SCH MG: at 09:53

## 2021-02-27 RX ADMIN — Medication SCH ML: at 22:19

## 2021-02-27 RX ADMIN — TEMAZEPAM PRN MG: 15 CAPSULE ORAL at 01:38

## 2021-02-27 RX ADMIN — CHOLECALCIFEROL TAB 125 MCG (5000 UNIT) SCH UNIT: 125 TAB at 09:53

## 2021-02-27 RX ADMIN — ENOXAPARIN SODIUM SCH MG: 100 INJECTION SUBCUTANEOUS at 22:19

## 2021-02-27 NOTE — PROGRESS NOTE
Assessment and Plan


Assessment and plan: 


--Severe hypoxia secondary to COVID-19 pneumonia


Current Visit: Yes   Status: Acute   


Plan to address problem: 


Patient remains on high flow oxygen


30 L/FiO2 70 /O2 sats 96%


Wean as tolerated, maintain O2 sats to more than 88 to 90%


Continue supportive care, pulmonary following


Home oxygen evaluation





--2019 novel coronavirus infection


Current Visit: Yes   Status: Acute   


Plan to address problem: 


Contact and droplet isolation


Management of COVID-19 per protocols


Completed empiric antibiotics and remdesivir


Completed dexamethasone 10 days last dose today. 


Prone position as tolerated


Home oxygen evaluation at discharge





-- Pneumonia


Current Visit: Yes   Status: Acute   


Plan to address problem: 


Completed empiric antibiotics, cultures negative to date





--Sepsis secondary to pneumonia


Current Visit: Yes   Status: Acute   


Plan to address problem: 


Completed antibiotics.  Supportive care





--DVT bilateral lower extremity


Current Visit: Yes   Status: Acute   


Plan to address problem: 


On full anticoagulation





--Full code status


Current Visit: Yes   Status: Acute   


Plan to address problem: 


Patient is a full code.





We will closely monitor the patient and adjust management as needed


Plan of care reviewed with the patient and his nurse


Is critically ill with very high flow nasal cannula oxygen for a long time


With guarded prognosis





2/17: Patient continues on high flow oxygen.  Coronavirus testing came back 

positive.  Patient is on steroids awaiting ID for remdesivir treatment 

initiation.  He is also noted to have acute bilateral DVT and with the severity 

of his illness I anticipate that he probably has pulmonary embolism.  We will 

continue full anticoagulation at this time.  Pulmonologist input is and ID input

appreciated.  We will add some vitamin supplementation and will evaluate if 

diuresis is needed.  Okay for patient to start diet, showing the patient was 

n.p.o. yesterday.  Plan has been discussed with the patient and also the nursing

staff and the officer on duty.





2/18: Remains on High flow oxygen 40L/100%. Prognosis poor. Prone as tolerated, 

will add some vitamins, give a dose of lasix. Updated the [facility] physician Dr Sy. Will discuss with Pulmonary if we should proceed with upgrade to the 

[facility]. Give a dose of lasix





2/19: Late entry for February 19 continue supportive care.  Continue to 

encourage prone position.  Wean oxygen as tolerated.  Continue remdesivir





2/21: Patient seen and examined clinically stable.  Still on high flow down to 

90%.  Saturating 91%.  Continue to encourage prone position continue steroids 

and remdesivir wean as tolerated. Advised again of diagnosis. Continue to use IS





2/22: Continue supportive care, wean as tolerated, Prone as tolerated, continue 

steroids and anticoagulation per hospital policy





2/25: Patient remains on high flow nasal cannula oxygen


Mild improvement, wean as tolerated, home O2 evaluation





2/26; patient anxious to go home, however continues to require high flow oxygen,

home O2 evaluation titrate as tolerated





2/27; patient remains on high flow oxygen 30 L/FiO2 70/96% O2 sats, wean as 

tolerated


Patient is critically ill with guarded prognosis, ID and pulmonary 

recommendations noted











History


Interval history: 


I have seen and examined the patient at the bedside


Patient's chart and medications reviewed


Strict isolation precautions and PPE protocols followed per COVID-19 guidelines


Patient remains on high flow nasal cannula oxygen today at 30 L


Patient is in mild distress, anxious to go home


Vital signs noted








Hospitalist Physical





- Constitutional


Vitals: 


                                        











Temp Pulse Resp BP Pulse Ox


 


 98.2 F   81   22   104/65   96 


 


 02/27/21 06:29  02/27/21 06:29  02/27/21 06:29  02/27/21 06:29  02/27/21 08:50











General appearance: Present: mild distress, well-nourished, other (Is on high 

flow oxygen)





- EENT


Eyes: Present: PERRL, EOM intact





- Neck


Neck: Present: supple, normal ROM





- Respiratory


Respiratory effort: normal


Respiratory: bilateral: diminished, rhonchi, negative: rales, wheezing





- Extremities


Extremities: no ischemia, No edema





- Abdominal


General gastrointestinal: soft, non-tender, non-distended, normal bowel sounds





- Integumentary


Integumentary: Present: clear, warm





- Psychiatric


Psychiatric: appropriate mood/affect, cooperative





- Neurologic


Neurologic: moves all extremities





HEART Score





- HEART Score


Troponin: 


                                        











Troponin T  < 0.010 ng/mL (0.00-0.029)   02/15/21  23:28    














Results





- Labs


CBC & Chem 7: 


                                 02/17/21 04:14





                                 02/20/21 05:50


Labs: 


                             Laboratory Last Values











WBC  11.9 K/mm3 (4.5-11.0)  H  02/17/21  04:14    


 


RBC  4.28 M/mm3 (3.65-5.03)   02/17/21  04:14    


 


Hgb  13.3 gm/dl (11.8-15.2)   02/17/21  04:14    


 


Hct  40.1 % (35.5-45.6)   02/17/21  04:14    


 


MCV  94 fl (84-94)   02/17/21  04:14    


 


MCH  31 pg (28-32)   02/17/21  04:14    


 


MCHC  33 % (32-34)   02/17/21  04:14    


 


RDW  14.1 % (13.2-15.2)   02/17/21  04:14    


 


Plt Count  220 K/mm3 (140-440)   02/17/21  04:14    


 


Lymph % (Auto)  5.4 % (13.4-35.0)  L  02/17/21  04:14    


 


Mono % (Auto)  6.9 % (0.0-7.3)   02/17/21  04:14    


 


Eos % (Auto)  0.0 % (0.0-4.3)   02/17/21  04:14    


 


Baso % (Auto)  0.2 % (0.0-1.8)   02/17/21  04:14    


 


Lymph # (Auto)  0.6 K/mm3 (1.2-5.4)  L  02/17/21  04:14    


 


Mono # (Auto)  0.8 K/mm3 (0.0-0.8)   02/17/21  04:14    


 


Eos # (Auto)  0.0 K/mm3 (0.0-0.4)   02/17/21  04:14    


 


Baso # (Auto)  0.0 K/mm3 (0.0-0.1)   02/17/21  04:14    


 


Seg Neutrophils %  87.5 % (40.0-70.0)  H  02/17/21  04:14    


 


Seg Neutrophils #  10.4 K/mm3 (1.8-7.7)  H  02/17/21  04:14    


 


PT  14.1 Sec. (12.2-14.9)   02/17/21  04:14    


 


INR  1.11  (0.87-1.13)   02/17/21  04:14    


 


APTT  26.5 Sec. (24.2-36.6)   02/15/21  23:28    


 


D-Dimer  1125.93 ng/mlDDU (0-234)  H  02/23/21  04:58    


 


Sodium  140 mmol/L (137-145)   02/20/21  05:50    


 


Potassium  4.7 mmol/L (3.6-5.0)   02/20/21  05:50    


 


Chloride  103.4 mmol/L ()   02/20/21  05:50    


 


Carbon Dioxide  30 mmol/L (22-30)   02/20/21  05:50    


 


Anion Gap  11 mmol/L  02/20/21  05:50    


 


BUN  26 mg/dL (9-20)  H  02/20/21  05:50    


 


Creatinine  0.5 mg/dL (0.8-1.3)  L  02/20/21  05:50    


 


Estimated GFR  > 60 ml/min  02/20/21  05:50    


 


BUN/Creatinine Ratio  52 %  02/20/21  05:50    


 


Glucose  88 mg/dL ()   02/20/21  05:50    


 


Lactic Acid  1.40 mmol/L (0.7-2.0)   02/16/21  04:51    


 


Calcium  7.7 mg/dL (8.4-10.2)  L  02/20/21  05:50    


 


Ferritin  333.2 ng/mL (30.0-300.0)  H  02/23/21  04:58    


 


Total Bilirubin  0.20 mg/dL (0.1-1.2)   02/20/21  05:50    


 


AST  64 units/L (5-40)  H  02/20/21  05:50    


 


ALT  46 units/L (7-56)   02/20/21  05:50    


 


Alkaline Phosphatase  64 units/L ()   02/20/21  05:50    


 


Lactate Dehydrogenase  382 units/L ()  H  02/23/21  04:58    


 


Troponin T  < 0.010 ng/mL (0.00-0.029)   02/15/21  23:28    


 


C-Reactive Protein  3.70 mg/dL (0.00-1.30)  H  02/23/21  04:58    


 


Total Protein  5.3 g/dL (6.3-8.2)  L  02/20/21  05:50    


 


Albumin  2.8 g/dL (3.9-5)  L  02/20/21  05:50    


 


Albumin/Globulin Ratio  1.1 %  02/20/21  05:50    


 


Procalcitonin  0.46 ng/mL (<0.15)   02/15/21  23:28    


 


TSH  0.573 mlU/mL (0.270-4.200)   02/16/21  13:51    


 


Free T4  1.21 ng/dL (0.76-1.46)   02/16/21  13:51    


 


Nasal Screen MRSA (PCR)  Negative  (Negative)   02/16/21  Unknown


 


Coronavirus (PCR)  Positive  (Negative)  A  02/16/21  10:18    











Godwin/IV: 


                                        





Voiding Method                   Urinal











Active Medications





- Current Medications


Current Medications: 














Generic Name Dose Route Start Last Admin





  Trade Name Freq  PRN Reason Stop Dose Admin


 


Acetaminophen  650 mg  02/16/21 00:57 





  Acetaminophen 325 Mg Tab  PO  





  Q4H PRN  





  Pain MILD(1-3)/Fever >100.5/HA  


 


Ascorbic Acid  1,000 mg  02/18/21 10:00  02/27/21 09:53





  Ascorbic Acid 500 Mg Tab  PO   1,000 mg





  BID YRN   Administration


 


Cholecalciferol  5,000 unit  02/18/21 10:00  02/27/21 09:53





  Cholecalciferol (Vit D3) 5,000 Unit Tab  PO   5,000 unit





  DAILY YRN   Administration


 


Enoxaparin Sodium  70 mg  02/16/21 11:30  02/27/21 09:52





  Enoxaparin 80 Mg/0.8 Ml Inj  SUB-Q   70 mg





  Q12HR YRN   Administration


 


Magnesium Hydroxide  30 ml  02/16/21 00:57 





  Magnesium Hydroxide (Mom) Oral Liqd Udc  PO  





  Q4H PRN  





  Constipation  


 


Morphine Sulfate  2 mg  02/16/21 00:57  02/22/21 22:14





  Morphine 2 Mg/1 Ml Inj  IV   2 mg





  Q4H PRN   Administration





  Pain, Moderate (4-6)  


 


Ondansetron HCl  4 mg  02/16/21 00:57 





  Ondansetron 4 Mg/2 Ml Inj  IV  





  Q8H PRN  





  Nausea And Vomiting  


 


Sodium Chloride  10 ml  02/16/21 10:00  02/27/21 09:53





  Sodium Chloride 0.9% 10 Ml Flush Syringe  IV   10 ml





  BID YRN   Administration


 


Sodium Chloride  10 ml  02/16/21 00:57 





  Sodium Chloride 0.9% 10 Ml Flush Syringe  IV  





  PRN PRN  





  LINE FLUSH  


 


Temazepam  15 mg  02/27/21 01:16  02/27/21 01:38





  Temazepam 15 Mg Cap  PO   15 mg





  QHS PRN   Administration





  Sleep  


 


Zinc Sulfate  220 mg  02/18/21 10:00  02/27/21 09:53





  Zinc Sulfate 220 Mg Cap  PO   220 mg





  QDAY YRN   Administration














Nutrition/Malnutrition Assess





- Dietary Evaluation


Nutrition/Malnutrition Findings: 


                                        





Nutrition Notes                                            Start:  02/16/21 

12:36


Freq:                                                      Status: Active       




Protocol:                                                                       




 Document     02/25/21 10:32  [email or web address]  (Rec: 02/25/21 10:42  [email or web address]  [id number])


 Co-Sign      02/25/21 10:32  


 Nutrition Notes


     Initial or Follow up                        Reassessment


     Current Diagnosis                           Sepsis,Respiratory Failure


     Other Pertinent Diagnosis                   COVID(+), SOB, Pneumonia


     Current Diet                                Regular Diet


     Labs/Tests                                  Reviewed


     Pertinent Medications                       Zinc Sulfate


                                                 Vit D3


                                                 Vit C


     Height                                      5 ft 9 in


     Weight                                      68 kg


     Ideal Body Weight (kg)                      72.72


     BMI                                         22.1


     Intake Prior to Admission                   Poor


     Weight Status                               Appropriate


     Subjective/Other Information                F/U for stable intakes. Spoke


                                                 with pt on the phone. Pt


                                                 consuming 100% of PO intakes


                                                 and drinking ONS. Pt did state


                                                 that he was confused and kept


                                                 asking what he should be


                                                 doing.


     Percent of energy/protein needs met:        100%/100%


     Burn                                        Absent


     Trauma                                      Absent


     GI Symptoms                                 None


     Food Allergy                                No


     Current % PO                                Good (%)


     Minimum of two criteria                     No


     Reduced  Strength                       N/A (non-severe)


     #2


      Nutrition Diagnosis                        No nutrition diagnosis at this


                                                 time


     Is patient on ventilator?                   No


     Is Patient Ambulatory and/or Out of Bed     No


     REE-([facility]-confined to bed)      [phone]


     Calculation Used for Recommendations        [facility]


     Additional Notes                            PRO needs: 68-82g (1-1.2 g/kg)


                                                 Fluid needs: 1 mL/kcal or per


                                                 MD


 Nutrition Intervention


     Change Diet Order:                          Continue Regular Diet


     Add Supplement/Snack (indicate name/kcal    Ensure Enlive BID


      /protein )                                 


     Provides kCal:                              700


     Provides Protein (gm)                       40


     Goal #1                                     Maintain weight


     Goal #2                                     Meet at least 75% of estimated


                                                 energy and protein needs via


                                                 diet and ONS


     Anticipated Discharge Needs:                Regular diet


     Follow-Up By:                               03/03/21


     Additional Comments                         F/U for stable intakes

## 2021-02-28 RX ADMIN — APIXABAN SCH MG: 5 TABLET, FILM COATED ORAL at 21:21

## 2021-02-28 RX ADMIN — OXYCODONE HYDROCHLORIDE AND ACETAMINOPHEN SCH MG: 500 TABLET ORAL at 21:21

## 2021-02-28 RX ADMIN — Medication SCH ML: at 10:45

## 2021-02-28 RX ADMIN — Medication SCH MG: at 10:45

## 2021-02-28 RX ADMIN — OXYCODONE HYDROCHLORIDE AND ACETAMINOPHEN SCH MG: 500 TABLET ORAL at 10:44

## 2021-02-28 RX ADMIN — CHOLECALCIFEROL TAB 125 MCG (5000 UNIT) SCH UNIT: 125 TAB at 10:44

## 2021-02-28 RX ADMIN — APIXABAN SCH MG: 5 TABLET, FILM COATED ORAL at 10:45

## 2021-02-28 RX ADMIN — Medication SCH ML: at 21:22

## 2021-02-28 RX ADMIN — TEMAZEPAM PRN MG: 15 CAPSULE ORAL at 21:22

## 2021-02-28 NOTE — PROGRESS NOTE
Assessment and Plan


Assessment and plan: 


--Severe hypoxia secondary to COVID-19 pneumonia


Current Visit: Yes   Status: Acute   


Plan to address problem: 


Patient remains on high flow oxygen


30 L/FiO2 70 /98% O2 sats 


Wean as tolerated, maintain O2 sats to more than 88 to 90%


Continue supportive care, pulmonary following


Home oxygen evaluation





--2019 novel coronavirus infection


Current Visit: Yes   Status: Acute   


Plan to address problem: 


Contact and droplet isolation


Management of COVID-19 per protocols


Completed empiric antibiotics and remdesivir


Completed dexamethasone 10 days last dose today. 


Prone position as tolerated


Home oxygen evaluation at discharge





-- Pneumonia


Current Visit: Yes   Status: Acute   


Plan to address problem: 


Completed empiric antibiotics, cultures negative to date





--Sepsis secondary to pneumonia


Current Visit: Yes   Status: Acute   


Plan to address problem: 


Completed antibiotics.  Supportive care





--DVT bilateral lower extremity


Current Visit: Yes   Status: Acute   


Plan to address problem: 


Transition from therapeutic Lovenox to Eliquis


Per protocol, elevate the limb





--Full code status


Current Visit: Yes   Status: Acute   


Plan to address problem: 


Patient is a full code.





We will closely monitor the patient and adjust management as needed


Plan of care reviewed with the patient and his nurse


Is critically ill with very high flow nasal cannula oxygen for a long time


With guarded prognosis





2/17: Patient continues on high flow oxygen.  Coronavirus testing came back 

positive.  Patient is on steroids awaiting ID for remdesivir treatment 

initiation.  He is also noted to have acute bilateral DVT and with the severity 

of his illness I anticipate that he probably has pulmonary embolism.  We will 

continue full anticoagulation at this time.  Pulmonologist input is and ID input

appreciated.  We will add some vitamin supplementation and will evaluate if 

diuresis is needed.  Okay for patient to start diet, showing the patient was 

n.p.o. yesterday.  Plan has been discussed with the patient and also the nursing

staff and the officer on duty.





2/18: Remains on High flow oxygen 40L/100%. Prognosis poor. Prone as tolerated, 

will add some vitamins, give a dose of lasix. Updated the custodial physician Dr Sy. Will discuss with Pulmonary if we should proceed with upgrade to the 

[facility]. Give a dose of lasix





2/19: Late entry for February 19 continue supportive care.  Continue to 

encourage prone position.  Wean oxygen as tolerated.  Continue remdesivir





2/21: Patient seen and examined clinically stable.  Still on high flow down to 

90%.  Saturating 91%.  Continue to encourage prone position continue steroids 

and remdesivir wean as tolerated. Advised again of diagnosis. Continue to use IS





2/22: Continue supportive care, wean as tolerated, Prone as tolerated, continue 

steroids and anticoagulation per hospital policy





2/25: Patient remains on high flow nasal cannula oxygen


Mild improvement, wean as tolerated, home O2 evaluation





2/26; patient anxious to go home, however continues to require high flow oxygen,

home O2 evaluation titrate as tolerated





2/27; patient remains on high flow oxygen 30 L/FiO2 70/96% O2 sats, wean as 

tolerated


Patient is critically ill with guarded prognosis, ID and pulmonary 

recommendations noted





2/28; severely hypoxic ,remains on high flow 30 L /70 FiO2/ 98 O2 sats 


Lower extremity bilateral DVT, therapeutic Lovenox transition to Eliquis per 

protocol








History


Interval history: 


I have seen and examined the patient at the bedside


Patient's chart and medications reviewed


Very strict isolation precautions and PPE protocols followed per COVID-19 

guidelines


Patient looks dehydrated and cachectic


Continues to remain on high flow oxygen 30 L/70/93%


Vital signs noted








Hospitalist Physical





- Constitutional


Vitals: 


                                        











Temp Pulse Resp BP Pulse Ox


 


 98.5 F   86   20   104/65   96 


 


 02/28/21 06:16  02/28/21 06:16  02/28/21 06:16  02/28/21 06:16  02/28/21 06:16











General appearance: Present: mild distress, well-nourished, other (Is on high 

flow oxygen)





- EENT


Eyes: Present: PERRL, EOM intact





- Neck


Neck: Present: supple, normal ROM





- Respiratory


Respiratory effort: normal


Respiratory: bilateral: diminished, negative: rales, rhonchi, wheezing





- Cardiovascular


Rhythm: regular


Heart Sounds: Present: S1 & S2





- Extremities


Extremities: no ischemia, No edema





- Abdominal


General gastrointestinal: soft, non-tender, non-distended, normal bowel sounds





- Integumentary


Integumentary: Present: clear, warm





- Psychiatric


Psychiatric: appropriate mood/affect, cooperative





- Neurologic


Neurologic: moves all extremities





HEART Score





- HEART Score


Troponin: 


                                        











Troponin T  < 0.010 ng/mL (0.00-0.029)   02/15/21  23:28    














Results





- Labs


CBC & Chem 7: 


                                 02/17/21 04:14





                                 02/20/21 05:50


Labs: 


                             Laboratory Last Values











WBC  11.9 K/mm3 (4.5-11.0)  H  02/17/21  04:14    


 


RBC  4.28 M/mm3 (3.65-5.03)   02/17/21  04:14    


 


Hgb  13.3 gm/dl (11.8-15.2)   02/17/21  04:14    


 


Hct  40.1 % (35.5-45.6)   02/17/21  04:14    


 


MCV  94 fl (84-94)   02/17/21  04:14    


 


MCH  31 pg (28-32)   02/17/21  04:14    


 


MCHC  33 % (32-34)   02/17/21  04:14    


 


RDW  14.1 % (13.2-15.2)   02/17/21  04:14    


 


Plt Count  220 K/mm3 (140-440)   02/17/21  04:14    


 


Lymph % (Auto)  5.4 % (13.4-35.0)  L  02/17/21  04:14    


 


Mono % (Auto)  6.9 % (0.0-7.3)   02/17/21  04:14    


 


Eos % (Auto)  0.0 % (0.0-4.3)   02/17/21  04:14    


 


Baso % (Auto)  0.2 % (0.0-1.8)   02/17/21  04:14    


 


Lymph # (Auto)  0.6 K/mm3 (1.2-5.4)  L  02/17/21  04:14    


 


Mono # (Auto)  0.8 K/mm3 (0.0-0.8)   02/17/21  04:14    


 


Eos # (Auto)  0.0 K/mm3 (0.0-0.4)   02/17/21  04:14    


 


Baso # (Auto)  0.0 K/mm3 (0.0-0.1)   02/17/21  04:14    


 


Seg Neutrophils %  87.5 % (40.0-70.0)  H  02/17/21  04:14    


 


Seg Neutrophils #  10.4 K/mm3 (1.8-7.7)  H  02/17/21  04:14    


 


PT  14.1 Sec. (12.2-14.9)   02/17/21  04:14    


 


INR  1.11  (0.87-1.13)   02/17/21  04:14    


 


APTT  26.5 Sec. (24.2-36.6)   02/15/21  23:28    


 


D-Dimer  1125.93 ng/mlDDU (0-234)  H  02/23/21  04:58    


 


Sodium  140 mmol/L (137-145)   02/20/21  05:50    


 


Potassium  4.7 mmol/L (3.6-5.0)   02/20/21  05:50    


 


Chloride  103.4 mmol/L ()   02/20/21  05:50    


 


Carbon Dioxide  30 mmol/L (22-30)   02/20/21  05:50    


 


Anion Gap  11 mmol/L  02/20/21  05:50    


 


BUN  26 mg/dL (9-20)  H  02/20/21  05:50    


 


Creatinine  0.5 mg/dL (0.8-1.3)  L  02/20/21  05:50    


 


Estimated GFR  > 60 ml/min  02/20/21  05:50    


 


BUN/Creatinine Ratio  52 %  02/20/21  05:50    


 


Glucose  88 mg/dL ()   02/20/21  05:50    


 


Lactic Acid  1.40 mmol/L (0.7-2.0)   02/16/21  04:51    


 


Calcium  7.7 mg/dL (8.4-10.2)  L  02/20/21  05:50    


 


Ferritin  333.2 ng/mL (30.0-300.0)  H  02/23/21  04:58    


 


Total Bilirubin  0.20 mg/dL (0.1-1.2)   02/20/21  05:50    


 


AST  64 units/L (5-40)  H  02/20/21  05:50    


 


ALT  46 units/L (7-56)   02/20/21  05:50    


 


Alkaline Phosphatase  64 units/L ()   02/20/21  05:50    


 


Lactate Dehydrogenase  382 units/L ()  H  02/23/21  04:58    


 


Troponin T  < 0.010 ng/mL (0.00-0.029)   02/15/21  23:28    


 


C-Reactive Protein  3.70 mg/dL (0.00-1.30)  H  02/23/21  04:58    


 


Total Protein  5.3 g/dL (6.3-8.2)  L  02/20/21  05:50    


 


Albumin  2.8 g/dL (3.9-5)  L  02/20/21  05:50    


 


Albumin/Globulin Ratio  1.1 %  02/20/21  05:50    


 


Procalcitonin  0.46 ng/mL (<0.15)   02/15/21  23:28    


 


TSH  0.573 mlU/mL (0.270-4.200)   02/16/21  13:51    


 


Free T4  1.21 ng/dL (0.76-1.46)   02/16/21  13:51    


 


Nasal Screen MRSA (PCR)  Negative  (Negative)   02/16/21  Unknown


 


Coronavirus (PCR)  Positive  (Negative)  A  02/16/21  10:18    











Godwin/IV: 


                                        





Voiding Method                   Urinal











Active Medications





- Current Medications


Current Medications: 














Generic Name Dose Route Start Last Admin





  Trade Name Freq  PRN Reason Stop Dose Admin


 


Acetaminophen  650 mg  02/16/21 00:57 





  Acetaminophen 325 Mg Tab  PO  





  Q4H PRN  





  Pain MILD(1-3)/Fever >100.5/HA  


 


Alprazolam  0.25 mg  02/27/21 14:44  02/27/21 15:08





  Alprazolam 0.25 Mg Tab  PO   0.25 mg





  Q8H PRN   Administration





  Anxiety  


 


Apixaban  10 mg  02/28/21 10:00 





  Apixaban 5 Mg Tab  PO  03/06/21 22:01 





  Q12HR [facility]  





  Protocol  


 


Apixaban  5 mg  03/07/21 10:00 





  Apixaban 5 Mg Tab  PO  





  Q12HR [facility]  





  Protocol  


 


Ascorbic Acid  1,000 mg  02/18/21 10:00  02/27/21 22:19





  Ascorbic Acid 500 Mg Tab  PO   1,000 mg





  BID YRN   Administration


 


Cholecalciferol  5,000 unit  02/18/21 10:00  02/27/21 09:53





  Cholecalciferol (Vit D3) 5,000 Unit Tab  PO   5,000 unit





  DAILY YRN   Administration


 


Magnesium Hydroxide  30 ml  02/16/21 00:57 





  Magnesium Hydroxide (Mom) Oral Liqd Udc  PO  





  Q4H PRN  





  Constipation  


 


Morphine Sulfate  2 mg  02/16/21 00:57  02/22/21 22:14





  Morphine 2 Mg/1 Ml Inj  IV   2 mg





  Q4H PRN   Administration





  Pain, Moderate (4-6)  


 


Ondansetron HCl  4 mg  02/16/21 00:57 





  Ondansetron 4 Mg/2 Ml Inj  IV  





  Q8H PRN  





  Nausea And Vomiting  


 


Sodium Chloride  10 ml  02/16/21 10:00  02/27/21 22:19





  Sodium Chloride 0.9% 10 Ml Flush Syringe  IV   10 ml





  BID YRN   Administration


 


Sodium Chloride  10 ml  02/16/21 00:57 





  Sodium Chloride 0.9% 10 Ml Flush Syringe  IV  





  PRN PRN  





  LINE FLUSH  


 


Temazepam  15 mg  02/27/21 01:16  02/27/21 22:19





  Temazepam 15 Mg Cap  PO   15 mg





  QHS PRN   Administration





  Sleep  


 


Zinc Sulfate  220 mg  02/18/21 10:00  02/27/21 09:53





  Zinc Sulfate 220 Mg Cap  PO   220 mg





  QDAY YRN   Administration














Nutrition/Malnutrition Assess





- Dietary Evaluation


Nutrition/Malnutrition Findings: 


                                        





Nutrition Notes                                            Start:  02/16/21 

12:36


Freq:                                                      Status: Active       




Protocol:                                                                       




 Document     02/25/21 10:32  [email or web address]  (Rec: 02/25/21 10:42  [email or web address]  [id number])


 Co-Sign      02/25/21 10:32  


 Nutrition Notes


     Initial or Follow up                        Reassessment


     Current Diagnosis                           Sepsis,Respiratory Failure


     Other Pertinent Diagnosis                   COVID(+), SOB, Pneumonia


     Current Diet                                Regular Diet


     Labs/Tests                                  Reviewed


     Pertinent Medications                       Zinc Sulfate


                                                 Vit D3


                                                 Vit C


     Height                                      5 ft 9 in


     Weight                                      68 kg


     Ideal Body Weight (kg)                      72.72


     BMI                                         22.1


     Intake Prior to Admission                   Poor


     Weight Status                               Appropriate


     Subjective/Other Information                F/U for stable intakes. Spoke


                                                 with pt on the phone. Pt


                                                 consuming 100% of PO intakes


                                                 and drinking ONS. Pt did state


                                                 that he was confused and kept


                                                 asking what he should be


                                                 doing.


     Percent of energy/protein needs met:        100%/100%


     Burn                                        Absent


     Trauma                                      Absent


     GI Symptoms                                 None


     Food Allergy                                No


     Current % PO                                Good (%)


     Minimum of two criteria                     No


     Reduced  Strength                       N/A (non-severe)


     #2


      Nutrition Diagnosis                        No nutrition diagnosis at this


                                                 time


     Is patient on ventilator?                   No


     Is Patient Ambulatory and/or Out of Bed     No


     REE-([facility]-confined to bed)      [phone]


     Calculation Used for Recommendations        [facility]


     Additional Notes                            PRO needs: 68-82g (1-1.2 g/kg)


                                                 Fluid needs: 1 mL/kcal or per


                                                 MD


 Nutrition Intervention


     Change Diet Order:                          Continue Regular Diet


     Add Supplement/Snack (indicate name/kcal    Ensure Enlive BID


      /protein )                                 


     Provides kCal:                              700


     Provides Protein (gm)                       40


     Goal #1                                     Maintain weight


     Goal #2                                     Meet at least 75% of estimated


                                                 energy and protein needs via


                                                 diet and ONS


     Anticipated Discharge Needs:                Regular diet


     Follow-Up By:                               03/03/21


     Additional Comments                         F/U for stable intakes

## 2021-02-28 NOTE — PROGRESS NOTE
Assessment and Plan





Agree with COVID precautions


Wean FiO2 for sats >88%


Agree with steroids, 10 days total finished.  Has finished Remdesivir


Prone as tolerated during the day and sleep prone at night.


Guarded prognosis.  First time being net negative on yesterday.  BP still 

remains too marginal for diuresis but may need to consider it.





Subjective


Date of service: 02/28/21


Interval history: 





Down to 20 liters and 70%. Good sats.





Objective


                               Vital Signs - 12hr











  02/28/21 02/28/21 02/28/21





  04:33 06:16 08:39


 


Temperature  98.5 F 


 


Pulse Rate  86 


 


Respiratory  20 





Rate   


 


Blood Pressure  104/65 


 


O2 Sat by Pulse 98 96 96





Oximetry   











Constitutional: no acute distress, alert


Eyes: non-icteric


ENT: oropharynx moist


Neck: supple


Ascultation: Bilateral: diminished breath sounds


Cardiovascular: regular rate and rhythm


Gastrointestinal: normoactive bowel sounds, soft, non-tender


Integumentary: normal


Extremities: no cyanosis


Neurologic: normal mental status


CBC and BMP: 


                                 02/17/21 04:14





                                 02/20/21 05:50


ABG, PT/INR, D-dimer: 


PT/INR, D-dimer











PT  14.1 Sec. (12.2-14.9)   02/17/21  04:14    


 


INR  1.11  (0.87-1.13)   02/17/21  04:14    


 


D-Dimer  1125.93 ng/mlDDU (0-234)  H  02/23/21  04:58    








Abnormal lab findings: 


                                  Abnormal Labs











  02/15/21 02/15/21 02/15/21





  23:28 23:28 23:28


 


WBC   


 


Lymph % (Auto)  4.1 L  


 


Mono % (Auto)  7.8 H  


 


Lymph # (Auto)  0.3 L  


 


Seg Neutrophils %  86.9 H  


 


Seg Neutrophils #   


 


D-Dimer   6514.44 H 


 


Sodium    135 L


 


Chloride    95.2 L


 


Carbon Dioxide   


 


BUN   


 


Creatinine    0.7 L


 


Glucose   


 


Calcium   


 


Ferritin   


 


AST   


 


Lactate Dehydrogenase   


 


C-Reactive Protein   


 


Total Protein   


 


Albumin    3.0 L


 


Coronavirus (PCR)   














  02/15/21 02/15/21 02/16/21





  23:28 23:28 10:18


 


WBC   


 


Lymph % (Auto)   


 


Mono % (Auto)   


 


Lymph # (Auto)   


 


Seg Neutrophils %   


 


Seg Neutrophils #   


 


D-Dimer   


 


Sodium   


 


Chloride   


 


Carbon Dioxide   


 


BUN   


 


Creatinine   


 


Glucose   


 


Calcium   


 


Ferritin  737.7 H  


 


AST   


 


Lactate Dehydrogenase   636 H 


 


C-Reactive Protein   17.30 H 


 


Total Protein   


 


Albumin   


 


Coronavirus (PCR)    Positive A














  02/17/21 02/17/21 02/18/21





  04:14 04:14 05:19


 


WBC  11.9 H  


 


Lymph % (Auto)  5.4 L  


 


Mono % (Auto)   


 


Lymph # (Auto)  0.6 L  


 


Seg Neutrophils %  87.5 H  


 


Seg Neutrophils #  10.4 H  


 


D-Dimer   


 


Sodium    136 L


 


Chloride   


 


Carbon Dioxide   


 


BUN   27 H  25 H


 


Creatinine   0.7 L  0.6 L


 


Glucose   138 H 


 


Calcium   8.3 L  7.8 L


 


Ferritin   


 


AST   


 


Lactate Dehydrogenase   


 


C-Reactive Protein   


 


Total Protein    5.3 L


 


Albumin    2.9 L


 


Coronavirus (PCR)   














  02/19/21 02/19/21 02/19/21





  05:26 05:26 05:26


 


WBC   


 


Lymph % (Auto)   


 


Mono % (Auto)   


 


Lymph # (Auto)   


 


Seg Neutrophils %   


 


Seg Neutrophils #   


 


D-Dimer   5476.96 H 


 


Sodium   


 


Chloride   


 


Carbon Dioxide  31 H  


 


BUN  22 H  


 


Creatinine  0.5 L  


 


Glucose  117 H  


 


Calcium  7.9 L  


 


Ferritin    382.0 H


 


AST   


 


Lactate Dehydrogenase   


 


C-Reactive Protein   


 


Total Protein  5.8 L  


 


Albumin  2.7 L  


 


Coronavirus (PCR)   














  02/19/21 02/20/21 02/21/21





  05:26 05:50 05:41


 


WBC   


 


Lymph % (Auto)   


 


Mono % (Auto)   


 


Lymph # (Auto)   


 


Seg Neutrophils %   


 


Seg Neutrophils #   


 


D-Dimer    3581.74 H


 


Sodium   


 


Chloride   


 


Carbon Dioxide   


 


BUN   26 H 


 


Creatinine   0.5 L 


 


Glucose   


 


Calcium   7.7 L 


 


Ferritin   


 


AST   64 H 


 


Lactate Dehydrogenase  424 H  


 


C-Reactive Protein  3.10 H  


 


Total Protein   5.3 L 


 


Albumin   2.8 L 


 


Coronavirus (PCR)   














  02/21/21 02/21/21 02/23/21





  05:41 05:41 04:58


 


WBC   


 


Lymph % (Auto)   


 


Mono % (Auto)   


 


Lymph # (Auto)   


 


Seg Neutrophils %   


 


Seg Neutrophils #   


 


D-Dimer    1125.93 H


 


Sodium   


 


Chloride   


 


Carbon Dioxide   


 


BUN   


 


Creatinine   


 


Glucose   


 


Calcium   


 


Ferritin  357.0 H  


 


AST   


 


Lactate Dehydrogenase   478 H 


 


C-Reactive Protein   3.00 H 


 


Total Protein   


 


Albumin   


 


Coronavirus (PCR)   














  02/23/21 02/23/21





  04:58 04:58


 


WBC  


 


Lymph % (Auto)  


 


Mono % (Auto)  


 


Lymph # (Auto)  


 


Seg Neutrophils %  


 


Seg Neutrophils #  


 


D-Dimer  


 


Sodium  


 


Chloride  


 


Carbon Dioxide  


 


BUN  


 


Creatinine  


 


Glucose  


 


Calcium  


 


Ferritin  333.2 H 


 


AST  


 


Lactate Dehydrogenase   382 H


 


C-Reactive Protein   3.70 H


 


Total Protein  


 


Albumin  


 


Coronavirus (PCR)

## 2021-03-01 RX ADMIN — APIXABAN SCH MG: 5 TABLET, FILM COATED ORAL at 10:05

## 2021-03-01 RX ADMIN — OXYCODONE HYDROCHLORIDE AND ACETAMINOPHEN SCH MG: 500 TABLET ORAL at 10:00

## 2021-03-01 RX ADMIN — CHOLECALCIFEROL TAB 125 MCG (5000 UNIT) SCH UNIT: 125 TAB at 10:02

## 2021-03-01 RX ADMIN — Medication SCH MG: at 10:02

## 2021-03-01 RX ADMIN — OXYCODONE HYDROCHLORIDE AND ACETAMINOPHEN SCH MG: 500 TABLET ORAL at 21:37

## 2021-03-01 RX ADMIN — APIXABAN SCH MG: 5 TABLET, FILM COATED ORAL at 21:38

## 2021-03-01 RX ADMIN — Medication SCH ML: at 10:05

## 2021-03-01 RX ADMIN — Medication SCH ML: at 21:38

## 2021-03-01 RX ADMIN — TEMAZEPAM PRN MG: 15 CAPSULE ORAL at 21:38

## 2021-03-01 NOTE — PROGRESS NOTE
Assessment and Plan





Agree with COVID precautions


Wean FiO2 for sats >88%


Agree with steroids, 10 days total finished.  Has finished Remdesivir


Prone as tolerated during the day and sleep prone at night.


Guarded prognosis.  





Subjective


Date of service: 03/01/21


Interval history: 





Patient down to 15 liters and 70%. Good sats.





Objective


                               Vital Signs - 12hr











  03/01/21 03/01/21





  03:18 08:20


 


O2 Sat by Pulse 100 95





Oximetry  











Constitutional: no acute distress, alert


Eyes: non-icteric


ENT: oropharynx moist


Neck: supple


Ascultation: Bilateral: diminished breath sounds


Cardiovascular: regular rate and rhythm


Gastrointestinal: normoactive bowel sounds, soft, non-tender


Integumentary: normal


Extremities: no cyanosis


Neurologic: normal mental status


CBC and BMP: 


                                 02/17/21 04:14





                                 02/20/21 05:50


ABG, PT/INR, D-dimer: 


PT/INR, D-dimer











PT  14.1 Sec. (12.2-14.9)   02/17/21  04:14    


 


INR  1.11  (0.87-1.13)   02/17/21  04:14    


 


D-Dimer  1125.93 ng/mlDDU (0-234)  H  02/23/21  04:58    








Abnormal lab findings: 


                                  Abnormal Labs











  02/15/21 02/15/21 02/15/21





  23:28 23:28 23:28


 


WBC   


 


Lymph % (Auto)  4.1 L  


 


Mono % (Auto)  7.8 H  


 


Lymph # (Auto)  0.3 L  


 


Seg Neutrophils %  86.9 H  


 


Seg Neutrophils #   


 


D-Dimer   6514.44 H 


 


Sodium    135 L


 


Chloride    95.2 L


 


Carbon Dioxide   


 


BUN   


 


Creatinine    0.7 L


 


Glucose   


 


Calcium   


 


Ferritin   


 


AST   


 


Lactate Dehydrogenase   


 


C-Reactive Protein   


 


Total Protein   


 


Albumin    3.0 L


 


Coronavirus (PCR)   














  02/15/21 02/15/21 02/16/21





  23:28 23:28 10:18


 


WBC   


 


Lymph % (Auto)   


 


Mono % (Auto)   


 


Lymph # (Auto)   


 


Seg Neutrophils %   


 


Seg Neutrophils #   


 


D-Dimer   


 


Sodium   


 


Chloride   


 


Carbon Dioxide   


 


BUN   


 


Creatinine   


 


Glucose   


 


Calcium   


 


Ferritin  737.7 H  


 


AST   


 


Lactate Dehydrogenase   636 H 


 


C-Reactive Protein   17.30 H 


 


Total Protein   


 


Albumin   


 


Coronavirus (PCR)    Positive A














  02/17/21 02/17/21 02/18/21





  04:14 04:14 05:19


 


WBC  11.9 H  


 


Lymph % (Auto)  5.4 L  


 


Mono % (Auto)   


 


Lymph # (Auto)  0.6 L  


 


Seg Neutrophils %  87.5 H  


 


Seg Neutrophils #  10.4 H  


 


D-Dimer   


 


Sodium    136 L


 


Chloride   


 


Carbon Dioxide   


 


BUN   27 H  25 H


 


Creatinine   0.7 L  0.6 L


 


Glucose   138 H 


 


Calcium   8.3 L  7.8 L


 


Ferritin   


 


AST   


 


Lactate Dehydrogenase   


 


C-Reactive Protein   


 


Total Protein    5.3 L


 


Albumin    2.9 L


 


Coronavirus (PCR)   














  02/19/21 02/19/21 02/19/21





  05:26 05:26 05:26


 


WBC   


 


Lymph % (Auto)   


 


Mono % (Auto)   


 


Lymph # (Auto)   


 


Seg Neutrophils %   


 


Seg Neutrophils #   


 


D-Dimer   5476.96 H 


 


Sodium   


 


Chloride   


 


Carbon Dioxide  31 H  


 


BUN  22 H  


 


Creatinine  0.5 L  


 


Glucose  117 H  


 


Calcium  7.9 L  


 


Ferritin    382.0 H


 


AST   


 


Lactate Dehydrogenase   


 


C-Reactive Protein   


 


Total Protein  5.8 L  


 


Albumin  2.7 L  


 


Coronavirus (PCR)   














  02/19/21 02/20/21 02/21/21





  05:26 05:50 05:41


 


WBC   


 


Lymph % (Auto)   


 


Mono % (Auto)   


 


Lymph # (Auto)   


 


Seg Neutrophils %   


 


Seg Neutrophils #   


 


D-Dimer    3581.74 H


 


Sodium   


 


Chloride   


 


Carbon Dioxide   


 


BUN   26 H 


 


Creatinine   0.5 L 


 


Glucose   


 


Calcium   7.7 L 


 


Ferritin   


 


AST   64 H 


 


Lactate Dehydrogenase  424 H  


 


C-Reactive Protein  3.10 H  


 


Total Protein   5.3 L 


 


Albumin   2.8 L 


 


Coronavirus (PCR)   














  02/21/21 02/21/21 02/23/21





  05:41 05:41 04:58


 


WBC   


 


Lymph % (Auto)   


 


Mono % (Auto)   


 


Lymph # (Auto)   


 


Seg Neutrophils %   


 


Seg Neutrophils #   


 


D-Dimer    1125.93 H


 


Sodium   


 


Chloride   


 


Carbon Dioxide   


 


BUN   


 


Creatinine   


 


Glucose   


 


Calcium   


 


Ferritin  357.0 H  


 


AST   


 


Lactate Dehydrogenase   478 H 


 


C-Reactive Protein   3.00 H 


 


Total Protein   


 


Albumin   


 


Coronavirus (PCR)   














  02/23/21 02/23/21





  04:58 04:58


 


WBC  


 


Lymph % (Auto)  


 


Mono % (Auto)  


 


Lymph # (Auto)  


 


Seg Neutrophils %  


 


Seg Neutrophils #  


 


D-Dimer  


 


Sodium  


 


Chloride  


 


Carbon Dioxide  


 


BUN  


 


Creatinine  


 


Glucose  


 


Calcium  


 


Ferritin  333.2 H 


 


AST  


 


Lactate Dehydrogenase   382 H


 


C-Reactive Protein   3.70 H


 


Total Protein  


 


Albumin  


 


Coronavirus (PCR)

## 2021-03-01 NOTE — PROGRESS NOTE
Assessment and Plan


Assessment and plan: 


--Severe hypoxia secondary to COVID-19 pneumonia


Current Visit: Yes   Status: Acute   


Plan to address problem: 


Patient remains on high flow oxygen


30 L/FiO2 70 /98% O2 sats 


Wean as tolerated, maintain O2 sats to more than 88 to 90%


Continue supportive care, pulmonary following


Home oxygen evaluation





--2019 novel coronavirus infection


Current Visit: Yes   Status: Acute   


Plan to address problem: 


Contact and droplet isolation


Management of COVID-19 per protocols


Completed empiric antibiotics and remdesivir


Completed dexamethasone 10 days last dose today. 


Prone position as tolerated


Home oxygen evaluation at discharge





-- Pneumonia


Current Visit: Yes   Status: Acute   


Plan to address problem: 


Completed empiric antibiotics, cultures negative to date





--Sepsis secondary to pneumonia


Current Visit: Yes   Status: Acute   


Plan to address problem: 


Completed antibiotics.  Supportive care





--DVT bilateral lower extremity


Current Visit: Yes   Status: Acute   


Plan to address problem: 


Transition from therapeutic Lovenox to Eliquis


Per protocol, elevate the limb





--Full code status


Current Visit: Yes   Status: Acute   


Plan to address problem: 


Patient is a full code.





We will closely monitor the patient and adjust management as needed


Plan of care reviewed with the patient and his nurse


Is critically ill with very high flow nasal cannula oxygen for a long time


With guarded prognosis





2/17: Patient continues on high flow oxygen.  Coronavirus testing came back 

positive.  Patient is on steroids awaiting ID for remdesivir treatment 

initiation.  He is also noted to have acute bilateral DVT and with the severity 

of his illness I anticipate that he probably has pulmonary embolism.  We will 

continue full anticoagulation at this time.  Pulmonologist input is and ID input

appreciated.  We will add some vitamin supplementation and will evaluate if 

diuresis is needed.  Okay for patient to start diet, showing the patient was 

n.p.o. yesterday.  Plan has been discussed with the patient and also the nursing

staff and the officer on duty.





2/18: Remains on High flow oxygen 40L/100%. Prognosis poor. Prone as tolerated, 

will add some vitamins, give a dose of lasix. Updated the senior care physician Dr Sy. Will discuss with Pulmonary if we should proceed with upgrade to the 

[facility]. Give a dose of lasix





2/19: Late entry for February 19 continue supportive care.  Continue to 

encourage prone position.  Wean oxygen as tolerated.  Continue remdesivir





2/21: Patient seen and examined clinically stable.  Still on high flow down to 

90%.  Saturating 91%.  Continue to encourage prone position continue steroids 

and remdesivir wean as tolerated. Advised again of diagnosis. Continue to use IS





2/22: Continue supportive care, wean as tolerated, Prone as tolerated, continue 

steroids and anticoagulation per hospital policy





2/25: Patient remains on high flow nasal cannula oxygen


Mild improvement, wean as tolerated, home O2 evaluation





2/26; patient anxious to go home, however continues to require high flow oxygen,

home O2 evaluation titrate as tolerated





2/27; patient remains on high flow oxygen 30 L/FiO2 70/96% O2 sats, wean as 

tolerated


Patient is critically ill with guarded prognosis, ID and pulmonary 

recommendations noted





2/28; severely hypoxic ,remains on high flow 30 L /70 FiO2/ 98 O2 sats 


Lower extremity bilateral DVT, therapeutic Lovenox transition to Eliquis per 

protocol





3/1; patient continues to require high flow oxygen but slightly improved since 

yesterday


15 L/70%/O2 sats 95, continue to wean oxygen as tolerated and, home oxygen 

evaluation


For possible discharge 1 to 2 days if stable








Brief history:


52-year-old male patient no past medical history presented from [facility] with complaints of cough, shortness of breath.  There are no other 

symptoms.  He is found to be hypoxic on presentation on room air at 70%.  He was

admitted as a Covid PUI.  Corona PCR positive


Placed on isolation evaluated by ID received management per COVID-19 protocol, 

patient continues to be severely hypoxemic requiring HFNC oxygen


Today patient is using 15 L/70% FiO2/95% O2 sats














History


Interval history: 


I have seen and examined the patient at the bedside


Patient's chart and medications reviewed


Strict isolation precautions, PPE protocols followed per COVID-19 guidelines


Patient feels slightly better irritated and anxious to go home


Patient is severely hypoxemic requiring high flow oxygen 15 L/70/95


Improved from 30 L yesterday


Vital signs noted





Hospitalist Physical





- Constitutional


Vitals: 


                                        











Temp Pulse Resp BP Pulse Ox


 


 98.3 F   89   24   115/74   95 


 


 02/28/21 21:59  02/28/21 21:59  02/28/21 21:59  02/28/21 21:59  03/01/21 08:20











General appearance: Present: mild distress, well-nourished, other (Is on high 

flow oxygen)





- EENT


Eyes: Present: PERRL, EOM intact





- Neck


Neck: Present: supple, normal ROM





- Respiratory


Respiratory effort: normal


Respiratory: bilateral: diminished, rhonchi, negative: rales, wheezing





- Cardiovascular


Rhythm: regular


Heart Sounds: Present: S1 & S2





- Extremities


Extremities: no ischemia, No edema





- Abdominal


General gastrointestinal: soft, non-tender, non-distended, normal bowel sounds





- Integumentary


Integumentary: Present: clear, warm





- Psychiatric


Psychiatric: appropriate mood/affect, cooperative





- Neurologic


Neurologic: moves all extremities





HEART Score





- HEART Score


Troponin: 


                                        











Troponin T  < 0.010 ng/mL (0.00-0.029)   02/15/21  23:28    














Results





- Labs


CBC & Chem 7: 


                                 02/17/21 04:14





                                 02/20/21 05:50


Labs: 


                             Laboratory Last Values











WBC  11.9 K/mm3 (4.5-11.0)  H  02/17/21  04:14    


 


RBC  4.28 M/mm3 (3.65-5.03)   02/17/21  04:14    


 


Hgb  13.3 gm/dl (11.8-15.2)   02/17/21  04:14    


 


Hct  40.1 % (35.5-45.6)   02/17/21  04:14    


 


MCV  94 fl (84-94)   02/17/21  04:14    


 


MCH  31 pg (28-32)   02/17/21  04:14    


 


MCHC  33 % (32-34)   02/17/21  04:14    


 


RDW  14.1 % (13.2-15.2)   02/17/21  04:14    


 


Plt Count  220 K/mm3 (140-440)   02/17/21  04:14    


 


Lymph % (Auto)  5.4 % (13.4-35.0)  L  02/17/21  04:14    


 


Mono % (Auto)  6.9 % (0.0-7.3)   02/17/21  04:14    


 


Eos % (Auto)  0.0 % (0.0-4.3)   02/17/21  04:14    


 


Baso % (Auto)  0.2 % (0.0-1.8)   02/17/21  04:14    


 


Lymph # (Auto)  0.6 K/mm3 (1.2-5.4)  L  02/17/21  04:14    


 


Mono # (Auto)  0.8 K/mm3 (0.0-0.8)   02/17/21  04:14    


 


Eos # (Auto)  0.0 K/mm3 (0.0-0.4)   02/17/21  04:14    


 


Baso # (Auto)  0.0 K/mm3 (0.0-0.1)   02/17/21  04:14    


 


Seg Neutrophils %  87.5 % (40.0-70.0)  H  02/17/21  04:14    


 


Seg Neutrophils #  10.4 K/mm3 (1.8-7.7)  H  02/17/21  04:14    


 


PT  14.1 Sec. (12.2-14.9)   02/17/21  04:14    


 


INR  1.11  (0.87-1.13)   02/17/21  04:14    


 


APTT  26.5 Sec. (24.2-36.6)   02/15/21  23:28    


 


D-Dimer  1125.93 ng/mlDDU (0-234)  H  02/23/21  04:58    


 


Sodium  140 mmol/L (137-145)   02/20/21  05:50    


 


Potassium  4.7 mmol/L (3.6-5.0)   02/20/21  05:50    


 


Chloride  103.4 mmol/L ()   02/20/21  05:50    


 


Carbon Dioxide  30 mmol/L (22-30)   02/20/21  05:50    


 


Anion Gap  11 mmol/L  02/20/21  05:50    


 


BUN  26 mg/dL (9-20)  H  02/20/21  05:50    


 


Creatinine  0.5 mg/dL (0.8-1.3)  L  02/20/21  05:50    


 


Estimated GFR  > 60 ml/min  02/20/21  05:50    


 


BUN/Creatinine Ratio  52 %  02/20/21  05:50    


 


Glucose  88 mg/dL ()   02/20/21  05:50    


 


Lactic Acid  1.40 mmol/L (0.7-2.0)   02/16/21  04:51    


 


Calcium  7.7 mg/dL (8.4-10.2)  L  02/20/21  05:50    


 


Ferritin  333.2 ng/mL (30.0-300.0)  H  02/23/21  04:58    


 


Total Bilirubin  0.20 mg/dL (0.1-1.2)   02/20/21  05:50    


 


AST  64 units/L (5-40)  H  02/20/21  05:50    


 


ALT  46 units/L (7-56)   02/20/21  05:50    


 


Alkaline Phosphatase  64 units/L ()   02/20/21  05:50    


 


Lactate Dehydrogenase  382 units/L ()  H  02/23/21  04:58    


 


Troponin T  < 0.010 ng/mL (0.00-0.029)   02/15/21  23:28    


 


C-Reactive Protein  3.70 mg/dL (0.00-1.30)  H  02/23/21  04:58    


 


Total Protein  5.3 g/dL (6.3-8.2)  L  02/20/21  05:50    


 


Albumin  2.8 g/dL (3.9-5)  L  02/20/21  05:50    


 


Albumin/Globulin Ratio  1.1 %  02/20/21  05:50    


 


Procalcitonin  0.46 ng/mL (<0.15)   02/15/21  23:28    


 


TSH  0.573 mlU/mL (0.270-4.200)   02/16/21  13:51    


 


Free T4  1.21 ng/dL (0.76-1.46)   02/16/21  13:51    


 


Nasal Screen MRSA (PCR)  Negative  (Negative)   02/16/21  Unknown


 


Coronavirus (PCR)  Positive  (Negative)  A  02/16/21  10:18    











Godwin/IV: 


                                        





Voiding Method                   Urinal











Active Medications





- Current Medications


Current Medications: 














Generic Name Dose Route Start Last Admin





  Trade Name Freq  PRN Reason Stop Dose Admin


 


Acetaminophen  650 mg  02/16/21 00:57 





  Acetaminophen 325 Mg Tab  PO  





  Q4H PRN  





  Pain MILD(1-3)/Fever >100.5/HA  


 


Alprazolam  0.25 mg  02/27/21 14:44  02/28/21 10:45





  Alprazolam 0.25 Mg Tab  PO   0.25 mg





  Q8H PRN   Administration





  Anxiety  


 


Apixaban  10 mg  02/28/21 10:00  03/01/21 10:05





  Apixaban 5 Mg Tab  PO  03/06/21 22:01  10 mg





  Q12HR YRN   Administration





  Protocol  


 


Apixaban  5 mg  03/07/21 10:00 





  Apixaban 5 Mg Tab  PO  





  Q12HR [facility]  





  Protocol  


 


Ascorbic Acid  1,000 mg  02/18/21 10:00  03/01/21 10:00





  Ascorbic Acid 500 Mg Tab  PO   1,000 mg





  BID YRN   Administration


 


Cholecalciferol  5,000 unit  02/18/21 10:00  03/01/21 10:02





  Cholecalciferol (Vit D3) 5,000 Unit Tab  PO   5,000 unit





  DAILY YRN   Administration


 


Magnesium Hydroxide  30 ml  02/16/21 00:57 





  Magnesium Hydroxide (Mom) Oral Liqd Udc  PO  





  Q4H PRN  





  Constipation  


 


Morphine Sulfate  2 mg  02/16/21 00:57  02/22/21 22:14





  Morphine 2 Mg/1 Ml Inj  IV   2 mg





  Q4H PRN   Administration





  Pain, Moderate (4-6)  


 


Ondansetron HCl  4 mg  02/16/21 00:57 





  Ondansetron 4 Mg/2 Ml Inj  IV  





  Q8H PRN  





  Nausea And Vomiting  


 


Sodium Chloride  10 ml  02/16/21 10:00  03/01/21 10:05





  Sodium Chloride 0.9% 10 Ml Flush Syringe  IV   10 ml





  BID YRN   Administration


 


Sodium Chloride  10 ml  02/16/21 00:57 





  Sodium Chloride 0.9% 10 Ml Flush Syringe  IV  





  PRN PRN  





  LINE FLUSH  


 


Temazepam  15 mg  02/27/21 01:16  02/28/21 21:22





  Temazepam 15 Mg Cap  PO   15 mg





  QHS PRN   Administration





  Sleep  


 


Zinc Sulfate  220 mg  02/18/21 10:00  03/01/21 10:02





  Zinc Sulfate 220 Mg Cap  PO   220 mg





  QDAY YRN   Administration














Nutrition/Malnutrition Assess





- Dietary Evaluation


Nutrition/Malnutrition Findings: 


                                        





Nutrition Notes                                            Start:  02/16/21 

12:36


Freq:                                                      Status: Active       




Protocol:                                                                       




 Document     02/25/21 10:32  [email or web address]  (Rec: 02/25/21 10:42  [email or web address]  [id number])


 Co-Sign      02/25/21 10:32  


 Nutrition Notes


     Initial or Follow up                        Reassessment


     Current Diagnosis                           Sepsis,Respiratory Failure


     Other Pertinent Diagnosis                   COVID(+), SOB, Pneumonia


     Current Diet                                Regular Diet


     Labs/Tests                                  Reviewed


     Pertinent Medications                       Zinc Sulfate


                                                 Vit D3


                                                 Vit C


     Height                                      5 ft 9 in


     Weight                                      68 kg


     Ideal Body Weight (kg)                      72.72


     BMI                                         22.1


     Intake Prior to Admission                   Poor


     Weight Status                               Appropriate


     Subjective/Other Information                F/U for stable intakes. Spoke


                                                 with pt on the phone. Pt


                                                 consuming 100% of PO intakes


                                                 and drinking ONS. Pt did state


                                                 that he was confused and kept


                                                 asking what he should be


                                                 doing.


     Percent of energy/protein needs met:        100%/100%


     Burn                                        Absent


     Trauma                                      Absent


     GI Symptoms                                 None


     Food Allergy                                No


     Current % PO                                Good (%)


     Minimum of two criteria                     No


     Reduced  Strength                       N/A (non-severe)


     #2


      Nutrition Diagnosis                        No nutrition diagnosis at this


                                                 time


     Is patient on ventilator?                   No


     Is Patient Ambulatory and/or Out of Bed     No


     REE-([facility]-confined to bed)      3604.186


     Calculation Used for Recommendations        [facility]


     Additional Notes                            PRO needs: 68-82g (1-1.2 g/kg)


                                                 Fluid needs: 1 mL/kcal or per


                                                 MD


 Nutrition Intervention


     Change Diet Order:                          Continue Regular Diet


     Add Supplement/Snack (indicate name/kcal    Ensure Enlive BID


      /protein )                                 


     Provides kCal:                              700


     Provides Protein (gm)                       40


     Goal #1                                     Maintain weight


     Goal #2                                     Meet at least 75% of estimated


                                                 energy and protein needs via


                                                 diet and ONS


     Anticipated Discharge Needs:                Regular diet


     Follow-Up By:                               03/03/21


     Additional Comments                         F/U for stable intakes

## 2021-03-02 RX ADMIN — Medication SCH MG: at 10:50

## 2021-03-02 RX ADMIN — Medication SCH ML: at 10:51

## 2021-03-02 RX ADMIN — CHOLECALCIFEROL TAB 125 MCG (5000 UNIT) SCH UNIT: 125 TAB at 10:50

## 2021-03-02 RX ADMIN — APIXABAN SCH MG: 5 TABLET, FILM COATED ORAL at 10:51

## 2021-03-02 RX ADMIN — TEMAZEPAM PRN MG: 15 CAPSULE ORAL at 21:43

## 2021-03-02 RX ADMIN — OXYCODONE HYDROCHLORIDE AND ACETAMINOPHEN SCH MG: 500 TABLET ORAL at 10:51

## 2021-03-02 RX ADMIN — OXYCODONE HYDROCHLORIDE AND ACETAMINOPHEN SCH MG: 500 TABLET ORAL at 21:42

## 2021-03-02 RX ADMIN — APIXABAN SCH MG: 5 TABLET, FILM COATED ORAL at 21:42

## 2021-03-02 RX ADMIN — Medication SCH ML: at 21:43

## 2021-03-02 NOTE — XRAY REPORT
CHEST 1 VIEW 3/2/2021 7:21 AM



INDICATION / CLINICAL INFORMATION: Follow-up COVID-19 pneumonia.



COMPARISON: None available.



FINDINGS:



SUPPORT DEVICES: None.

HEART / MEDIASTINUM: Unchanged 

LUNGS / PLEURA: Bilateral pulmonary opacities persist. No pneumothorax. 



ADDITIONAL FINDINGS: No significant additional findings.



IMPRESSION:

1. No significant change



Signer Name: Jordan Duckworth MD 

Signed: 3/2/2021 8:31 AM

Workstation Name: [id number]

## 2021-03-02 NOTE — PROGRESS NOTE
Assessment and Plan





Assessment and Plan


Assessment and plan: 


--Severe hypoxia secondary to COVID-19 pneumonia


Current Visit: Yes   Status: Acute   


Plan to address problem: 


Patient remains on high flow oxygen


30 L/FiO2 70 /98% O2 sats 


Wean as tolerated, maintain O2 sats to more than 88 to 90%


Continue supportive care, pulmonary following


Home oxygen evaluation





--2019 novel coronavirus infection


Current Visit: Yes   Status: Acute   


Plan to address problem: 


Contact and droplet isolation


Management of COVID-19 per protocols


Completed empiric antibiotics and remdesivir


Completed dexamethasone 10 days last dose today. 


Prone position as tolerated


Home oxygen evaluation at discharge





-- Pneumonia


Current Visit: Yes   Status: Acute   


Plan to address problem: 


Completed empiric antibiotics, cultures negative to date





--Sepsis secondary to pneumonia


Current Visit: Yes   Status: Acute   


Plan to address problem: 


Completed antibiotics.  Supportive care





--DVT bilateral lower extremity


Current Visit: Yes   Status: Acute   


Plan to address problem: 


Transition from therapeutic Lovenox to Eliquis


Per protocol, elevate the limb





--Full code status


Current Visit: Yes   Status: Acute   


Plan to address problem: 


Patient is a full code.








Subjective


Date of service: 03/02/21


Principal diagnosis: Acute respiratory failure with hypoxia, Covid pneumonia


Interval history: 





Brief history:


52-year-old male patient no past medical history presented from [facility] with complaints of cough, shortness of breath.  There are no other 

symptoms.  He is found to be hypoxic on presentation on room air at 70%.  He was

admitted as a Covid PUI.  Corona PCR positive


Placed on isolation evaluated by ID received management per COVID-19 protocol, 

patient continues to be severely hypoxemic requiring HFNC oxygen


Today patient is using 15 L/70% FiO2/95% O2 sats














2/17: Patient continues on high flow oxygen.  Coronavirus testing came back 

positive.  Patient is on steroids awaiting ID for remdesivir treatment 

initiation.  He is also noted to have acute bilateral DVT and with the severity 

of his illness I anticipate that he probably has pulmonary embolism.  We will 

continue full anticoagulation at this time.  Pulmonologist input is and ID input

appreciated.  We will add some vitamin supplementation and will evaluate if 

diuresis is needed.  Okay for patient to start diet, showing the patient was n.p

.o. yesterday.  Plan has been discussed with the patient and also the nursing 

staff and the officer on duty.





2/18: Remains on High flow oxygen 40L/100%. Prognosis poor. Prone as tolerated, 

will add some vitamins, give a dose of lasix. Updated the shelter physician Dr Sy. Will discuss with Pulmonary if we should proceed with upgrade to the 

[facility]. Give a dose of lasix





2/19: Late entry for February 19 continue supportive care.  Continue to 

encourage prone position.  Wean oxygen as tolerated.  Continue remdesivir





2/21: Patient seen and examined clinically stable.  Still on high flow down to 

90%.  Saturating 91%.  Continue to encourage prone position continue steroids 

and remdesivir wean as tolerated. Advised again of diagnosis. Continue to use IS





2/22: Continue supportive care, wean as tolerated, Prone as tolerated, continue 

steroids and anticoagulation per hospital policy





2/25: Patient remains on high flow nasal cannula oxygen


Mild improvement, wean as tolerated, home O2 evaluation





2/26; patient anxious to go home, however continues to require high flow oxygen,

home O2 evaluation titrate as tolerated





2/27; patient remains on high flow oxygen 30 L/FiO2 70/96% O2 sats, wean as t

olerated


Patient is critically ill with guarded prognosis, ID and pulmonary 

recommendations noted





2/28; severely hypoxic ,remains on high flow 30 L /70 FiO2/ 98 O2 sats 


Lower extremity bilateral DVT, therapeutic Lovenox transition to Eliquis per 

protocol





3/1; patient continues to require high flow oxygen but slightly improved since 

yesterday


15 L/70%/O2 sats 95, continue to wean oxygen as tolerated and, home oxygen 

evaluation


For possible discharge 1 to 2 days if stable





3/2/21 


Weaned oxygen from 15 L to 3 L.























Objective





- Constitutional


Vitals: 


                               Vital Signs - 12hr











  03/02/21 03/02/21 03/02/21





  07:04 08:00 16:23


 


Temperature 97.9 F  


 


Pulse Rate 88  


 


Respiratory 16  





Rate   


 


Blood Pressure 105/67  


 


O2 Sat by Pulse 97 96 95





Oximetry   











General appearance: Present: no acute distress, well-nourished





- EENT


Eyes: PERRL, EOM intact


ENT: hearing intact, clear oral mucosa


Ears: bilateral: normal





- Neck


Neck: supple, normal ROM





- Respiratory


Respiratory effort: normal


Respiratory: bilateral: CTA, rhonchi (Scattered)





- Breasts


Breasts: normal





- Cardiovascular


Rhythm: regular


Heart Sounds: Present: S1 & S2.  Absent: gallop, rub


Extremities: pulses intact, No edema, normal color, Full ROM





- Gastrointestinal


General gastrointestinal: Present: soft, non-tender, non-distended, normal bowel

sounds





- Genitourinary


Male genitourinary: normal





- Integumentary


Integumentary: clear, warm, dry





- Musculoskeletal


Musculoskeletal: 1, strength equal bilaterally





- Neurologic


Neurologic: moves all extremities





- Psychiatric


Psychiatric: memory intact, appropriate mood/affect, intact judgment & insight





- Labs


CBC & Chem 7: 


                                 03/03/21 05:05





                                 03/03/21 05:05





HEART Score





- HEART Score


Troponin: 


                                        











Troponin T  < 0.010 ng/mL (0.00-0.029)   02/15/21  23:28

## 2021-03-02 NOTE — PROGRESS NOTE
Assessment and Plan





Agree with COVID precautions


Wean FiO2 for sats >88%


Agreed with steroids, 10 days total finished.  Has finished Remdesivir


Prone as tolerated during the day and sleep prone at night.


Guarded prognosis.  





Subjective


Date of service: 03/02/21


Interval history: 





No acute events.  RT weaning Flow and FIO2.  Good sats.





Objective


                               Vital Signs - 12hr











  03/01/21 03/02/21 03/02/21





  22:00 00:18 05:00


 


Temperature  97.7 F 


 


Pulse Rate  104 H 


 


Pulse Rate [ 104 H  





From Monitor]   


 


Respiratory 22 22 





Rate   


 


Blood Pressure  116/77 


 


O2 Sat by Pulse 90 90 97





Oximetry   














  03/02/21 03/02/21





  07:04 08:00


 


Temperature 97.9 F 


 


Pulse Rate 88 


 


Pulse Rate [  





From Monitor]  


 


Respiratory 16 





Rate  


 


Blood Pressure 105/67 


 


O2 Sat by Pulse 97 96





Oximetry  











Constitutional: no acute distress, alert


Eyes: non-icteric


ENT: oropharynx moist


Neck: supple


Ascultation: Bilateral: diminished breath sounds


Cardiovascular: regular rate and rhythm


Gastrointestinal: normoactive bowel sounds, soft, non-tender


Integumentary: normal


Extremities: no cyanosis


Neurologic: normal mental status


CBC and BMP: 


                                 02/17/21 04:14





                                 02/20/21 05:50


ABG, PT/INR, D-dimer: 


PT/INR, D-dimer











PT  14.1 Sec. (12.2-14.9)   02/17/21  04:14    


 


INR  1.11  (0.87-1.13)   02/17/21  04:14    


 


D-Dimer  1125.93 ng/mlDDU (0-234)  H  02/23/21  04:58    








Abnormal lab findings: 


                                  Abnormal Labs











  02/15/21 02/15/21 02/15/21





  23:28 23:28 23:28


 


WBC   


 


Lymph % (Auto)  4.1 L  


 


Mono % (Auto)  7.8 H  


 


Lymph # (Auto)  0.3 L  


 


Seg Neutrophils %  86.9 H  


 


Seg Neutrophils #   


 


D-Dimer   6514.44 H 


 


Sodium    135 L


 


Chloride    95.2 L


 


Carbon Dioxide   


 


BUN   


 


Creatinine    0.7 L


 


Glucose   


 


Calcium   


 


Ferritin   


 


AST   


 


Lactate Dehydrogenase   


 


C-Reactive Protein   


 


Total Protein   


 


Albumin    3.0 L


 


Coronavirus (PCR)   














  02/15/21 02/15/21 02/16/21





  23:28 23:28 10:18


 


WBC   


 


Lymph % (Auto)   


 


Mono % (Auto)   


 


Lymph # (Auto)   


 


Seg Neutrophils %   


 


Seg Neutrophils #   


 


D-Dimer   


 


Sodium   


 


Chloride   


 


Carbon Dioxide   


 


BUN   


 


Creatinine   


 


Glucose   


 


Calcium   


 


Ferritin  737.7 H  


 


AST   


 


Lactate Dehydrogenase   636 H 


 


C-Reactive Protein   17.30 H 


 


Total Protein   


 


Albumin   


 


Coronavirus (PCR)    Positive A














  02/17/21 02/17/21 02/18/21





  04:14 04:14 05:19


 


WBC  11.9 H  


 


Lymph % (Auto)  5.4 L  


 


Mono % (Auto)   


 


Lymph # (Auto)  0.6 L  


 


Seg Neutrophils %  87.5 H  


 


Seg Neutrophils #  10.4 H  


 


D-Dimer   


 


Sodium    136 L


 


Chloride   


 


Carbon Dioxide   


 


BUN   27 H  25 H


 


Creatinine   0.7 L  0.6 L


 


Glucose   138 H 


 


Calcium   8.3 L  7.8 L


 


Ferritin   


 


AST   


 


Lactate Dehydrogenase   


 


C-Reactive Protein   


 


Total Protein    5.3 L


 


Albumin    2.9 L


 


Coronavirus (PCR)   














  02/19/21 02/19/21 02/19/21





  05:26 05:26 05:26


 


WBC   


 


Lymph % (Auto)   


 


Mono % (Auto)   


 


Lymph # (Auto)   


 


Seg Neutrophils %   


 


Seg Neutrophils #   


 


D-Dimer   5476.96 H 


 


Sodium   


 


Chloride   


 


Carbon Dioxide  31 H  


 


BUN  22 H  


 


Creatinine  0.5 L  


 


Glucose  117 H  


 


Calcium  7.9 L  


 


Ferritin    382.0 H


 


AST   


 


Lactate Dehydrogenase   


 


C-Reactive Protein   


 


Total Protein  5.8 L  


 


Albumin  2.7 L  


 


Coronavirus (PCR)   














  02/19/21 02/20/21 02/21/21





  05:26 05:50 05:41


 


WBC   


 


Lymph % (Auto)   


 


Mono % (Auto)   


 


Lymph # (Auto)   


 


Seg Neutrophils %   


 


Seg Neutrophils #   


 


D-Dimer    3581.74 H


 


Sodium   


 


Chloride   


 


Carbon Dioxide   


 


BUN   26 H 


 


Creatinine   0.5 L 


 


Glucose   


 


Calcium   7.7 L 


 


Ferritin   


 


AST   64 H 


 


Lactate Dehydrogenase  424 H  


 


C-Reactive Protein  3.10 H  


 


Total Protein   5.3 L 


 


Albumin   2.8 L 


 


Coronavirus (PCR)   














  02/21/21 02/21/21 02/23/21





  05:41 05:41 04:58


 


WBC   


 


Lymph % (Auto)   


 


Mono % (Auto)   


 


Lymph # (Auto)   


 


Seg Neutrophils %   


 


Seg Neutrophils #   


 


D-Dimer    1125.93 H


 


Sodium   


 


Chloride   


 


Carbon Dioxide   


 


BUN   


 


Creatinine   


 


Glucose   


 


Calcium   


 


Ferritin  357.0 H  


 


AST   


 


Lactate Dehydrogenase   478 H 


 


C-Reactive Protein   3.00 H 


 


Total Protein   


 


Albumin   


 


Coronavirus (PCR)   














  02/23/21 02/23/21





  04:58 04:58


 


WBC  


 


Lymph % (Auto)  


 


Mono % (Auto)  


 


Lymph # (Auto)  


 


Seg Neutrophils %  


 


Seg Neutrophils #  


 


D-Dimer  


 


Sodium  


 


Chloride  


 


Carbon Dioxide  


 


BUN  


 


Creatinine  


 


Glucose  


 


Calcium  


 


Ferritin  333.2 H 


 


AST  


 


Lactate Dehydrogenase   382 H


 


C-Reactive Protein   3.70 H


 


Total Protein  


 


Albumin  


 


Coronavirus (PCR)

## 2021-03-03 LAB
ALBUMIN SERPL-MCNC: 2.7 G/DL (ref 3.9–5)
ALT SERPL-CCNC: 27 UNITS/L (ref 7–56)
BASOPHILS # (AUTO): 0.1 K/MM3 (ref 0–0.1)
BASOPHILS NFR BLD AUTO: 0.8 % (ref 0–1.8)
BUN SERPL-MCNC: 21 MG/DL (ref 9–20)
BUN/CREAT SERPL: 53 %
CALCIUM SERPL-MCNC: 8.5 MG/DL (ref 8.4–10.2)
EOSINOPHIL # BLD AUTO: 0.2 K/MM3 (ref 0–0.4)
EOSINOPHIL NFR BLD AUTO: 2.6 % (ref 0–4.3)
HCT VFR BLD CALC: 37 % (ref 35.5–45.6)
HEMOLYSIS INDEX: 2
HGB BLD-MCNC: 12.7 GM/DL (ref 11.8–15.2)
LYMPHOCYTES # BLD AUTO: 1 K/MM3 (ref 1.2–5.4)
LYMPHOCYTES NFR BLD AUTO: 12 % (ref 13.4–35)
MCHC RBC AUTO-ENTMCNC: 35 % (ref 32–34)
MCV RBC AUTO: 93 FL (ref 84–94)
MONOCYTES # (AUTO): 0.7 K/MM3 (ref 0–0.8)
MONOCYTES % (AUTO): 8.2 % (ref 0–7.3)
PLATELET # BLD: 175 K/MM3 (ref 140–440)
RBC # BLD AUTO: 3.97 M/MM3 (ref 3.65–5.03)

## 2021-03-03 RX ADMIN — APIXABAN SCH MG: 5 TABLET, FILM COATED ORAL at 21:37

## 2021-03-03 RX ADMIN — OXYCODONE HYDROCHLORIDE AND ACETAMINOPHEN SCH MG: 500 TABLET ORAL at 09:48

## 2021-03-03 RX ADMIN — APIXABAN SCH MG: 5 TABLET, FILM COATED ORAL at 09:48

## 2021-03-03 RX ADMIN — OXYCODONE HYDROCHLORIDE AND ACETAMINOPHEN SCH MG: 500 TABLET ORAL at 21:38

## 2021-03-03 RX ADMIN — CHOLECALCIFEROL TAB 125 MCG (5000 UNIT) SCH UNIT: 125 TAB at 09:49

## 2021-03-03 RX ADMIN — Medication SCH ML: at 09:49

## 2021-03-03 RX ADMIN — Medication SCH ML: at 21:38

## 2021-03-03 RX ADMIN — Medication SCH MG: at 09:48

## 2021-03-03 NOTE — PROGRESS NOTE
Assessment and Plan





Agree with COVID precautions


Wean FiO2 for sats >88%


Agreed with steroids, 10 days total finished.  Has finished Remdesivir


Prone as tolerated during the day and sleep prone at night.


Guarded prognosis.  Hopeful to wean to [place] later today and then maybe to 

[place] over the next 2-3 days.  Patient has no funding so ideally need to try 

to wean him off oxygen unless he can afford to pay for it.





Subjective


Date of service: 03/03/21


Interval history: 





No acute events.  As of last night down to 15 liters and 60%.  No documentation 

of requirements yet this am.





Objective


                               Vital Signs - 12hr











  03/02/21 03/02/21 03/03/21





  22:00 22:08 02:34


 


Temperature  97.4 F L 


 


Pulse Rate  92 H 


 


Pulse Rate [ 92 H  





From Monitor]   


 


Respiratory 22 22 





Rate   


 


Blood Pressure  99/67 


 


O2 Sat by Pulse 60 L 94 95





Oximetry   














  03/03/21





  05:01


 


Temperature 98.2 F


 


Pulse Rate 98 H


 


Pulse Rate [ 





From Monitor] 


 


Respiratory 27 H





Rate 


 


Blood Pressure 103/69


 


O2 Sat by Pulse 94





Oximetry 











Constitutional: no acute distress, alert


Eyes: non-icteric


ENT: oropharynx moist


Neck: supple


Ascultation: Bilateral: diminished breath sounds


Cardiovascular: regular rate and rhythm


Gastrointestinal: normoactive bowel sounds, soft, non-tender


Integumentary: normal


Extremities: no cyanosis


Neurologic: normal mental status


CBC and BMP: 


                                 03/03/21 05:05





                                 03/03/21 05:05


ABG, PT/INR, D-dimer: 


PT/INR, D-dimer











PT  14.1 Sec. (12.2-14.9)   02/17/21  04:14    


 


INR  1.11  (0.87-1.13)   02/17/21  04:14    


 


D-Dimer  1125.93 ng/mlDDU (0-234)  H  02/23/21  04:58    








Abnormal lab findings: 


                                  Abnormal Labs











  02/15/21 02/15/21 02/15/21





  23:28 23:28 23:28


 


WBC   


 


MCHC   


 


Lymph % (Auto)  4.1 L  


 


Mono % (Auto)  7.8 H  


 


Lymph # (Auto)  0.3 L  


 


Seg Neutrophils %  86.9 H  


 


Seg Neutrophils #   


 


D-Dimer   6514.44 H 


 


Sodium    135 L


 


Chloride    95.2 L


 


Carbon Dioxide   


 


BUN   


 


Creatinine    0.7 L


 


Glucose   


 


Calcium   


 


Ferritin   


 


AST   


 


Lactate Dehydrogenase   


 


C-Reactive Protein   


 


Total Protein   


 


Albumin    3.0 L


 


Coronavirus (PCR)   














  02/15/21 02/15/21 02/16/21





  23:28 23:28 10:18


 


WBC   


 


MCHC   


 


Lymph % (Auto)   


 


Mono % (Auto)   


 


Lymph # (Auto)   


 


Seg Neutrophils %   


 


Seg Neutrophils #   


 


D-Dimer   


 


Sodium   


 


Chloride   


 


Carbon Dioxide   


 


BUN   


 


Creatinine   


 


Glucose   


 


Calcium   


 


Ferritin  737.7 H  


 


AST   


 


Lactate Dehydrogenase   636 H 


 


C-Reactive Protein   17.30 H 


 


Total Protein   


 


Albumin   


 


Coronavirus (PCR)    Positive A














  02/17/21 02/17/21 02/18/21





  04:14 04:14 05:19


 


WBC  11.9 H  


 


MCHC   


 


Lymph % (Auto)  5.4 L  


 


Mono % (Auto)   


 


Lymph # (Auto)  0.6 L  


 


Seg Neutrophils %  87.5 H  


 


Seg Neutrophils #  10.4 H  


 


D-Dimer   


 


Sodium    136 L


 


Chloride   


 


Carbon Dioxide   


 


BUN   27 H  25 H


 


Creatinine   0.7 L  0.6 L


 


Glucose   138 H 


 


Calcium   8.3 L  7.8 L


 


Ferritin   


 


AST   


 


Lactate Dehydrogenase   


 


C-Reactive Protein   


 


Total Protein    5.3 L


 


Albumin    2.9 L


 


Coronavirus (PCR)   














  02/19/21 02/19/21 02/19/21





  05:26 05:26 05:26


 


WBC   


 


MCHC   


 


Lymph % (Auto)   


 


Mono % (Auto)   


 


Lymph # (Auto)   


 


Seg Neutrophils %   


 


Seg Neutrophils #   


 


D-Dimer   5476.96 H 


 


Sodium   


 


Chloride   


 


Carbon Dioxide  31 H  


 


BUN  22 H  


 


Creatinine  0.5 L  


 


Glucose  117 H  


 


Calcium  7.9 L  


 


Ferritin    382.0 H


 


AST   


 


Lactate Dehydrogenase   


 


C-Reactive Protein   


 


Total Protein  5.8 L  


 


Albumin  2.7 L  


 


Coronavirus (PCR)   














  02/19/21 02/20/21 02/21/21





  05:26 05:50 05:41


 


WBC   


 


MCHC   


 


Lymph % (Auto)   


 


Mono % (Auto)   


 


Lymph # (Auto)   


 


Seg Neutrophils %   


 


Seg Neutrophils #   


 


D-Dimer    3581.74 H


 


Sodium   


 


Chloride   


 


Carbon Dioxide   


 


BUN   26 H 


 


Creatinine   0.5 L 


 


Glucose   


 


Calcium   7.7 L 


 


Ferritin   


 


AST   64 H 


 


Lactate Dehydrogenase  424 H  


 


C-Reactive Protein  3.10 H  


 


Total Protein   5.3 L 


 


Albumin   2.8 L 


 


Coronavirus (PCR)   














  02/21/21 02/21/21 02/23/21





  05:41 05:41 04:58


 


WBC   


 


MCHC   


 


Lymph % (Auto)   


 


Mono % (Auto)   


 


Lymph # (Auto)   


 


Seg Neutrophils %   


 


Seg Neutrophils #   


 


D-Dimer    1125.93 H


 


Sodium   


 


Chloride   


 


Carbon Dioxide   


 


BUN   


 


Creatinine   


 


Glucose   


 


Calcium   


 


Ferritin  357.0 H  


 


AST   


 


Lactate Dehydrogenase   478 H 


 


C-Reactive Protein   3.00 H 


 


Total Protein   


 


Albumin   


 


Coronavirus (PCR)   














  02/23/21 02/23/21 03/03/21





  04:58 04:58 05:05


 


WBC   


 


MCHC    35 H


 


Lymph % (Auto)    12.0 L


 


Mono % (Auto)    8.2 H


 


Lymph # (Auto)    1.0 L


 


Seg Neutrophils %    76.4 H


 


Seg Neutrophils #   


 


D-Dimer   


 


Sodium   


 


Chloride   


 


Carbon Dioxide   


 


BUN   


 


Creatinine   


 


Glucose   


 


Calcium   


 


Ferritin  333.2 H  


 


AST   


 


Lactate Dehydrogenase   382 H 


 


C-Reactive Protein   3.70 H 


 


Total Protein   


 


Albumin   


 


Coronavirus (PCR)   














  03/03/21





  05:05


 


WBC 


 


MCHC 


 


Lymph % (Auto) 


 


Mono % (Auto) 


 


Lymph # (Auto) 


 


Seg Neutrophils % 


 


Seg Neutrophils # 


 


D-Dimer 


 


Sodium 


 


Chloride 


 


Carbon Dioxide 


 


BUN  21 H


 


Creatinine  0.4 L


 


Glucose 


 


Calcium 


 


Ferritin 


 


AST 


 


Lactate Dehydrogenase 


 


C-Reactive Protein 


 


Total Protein  6.2 L


 


Albumin  2.7 L


 


Coronavirus (PCR)

## 2021-03-04 RX ADMIN — Medication SCH MG: at 11:17

## 2021-03-04 RX ADMIN — OXYCODONE HYDROCHLORIDE AND ACETAMINOPHEN SCH MG: 500 TABLET ORAL at 22:19

## 2021-03-04 RX ADMIN — OXYCODONE HYDROCHLORIDE AND ACETAMINOPHEN SCH MG: 500 TABLET ORAL at 11:16

## 2021-03-04 RX ADMIN — APIXABAN SCH MG: 5 TABLET, FILM COATED ORAL at 11:16

## 2021-03-04 RX ADMIN — Medication SCH ML: at 11:17

## 2021-03-04 RX ADMIN — Medication SCH ML: at 22:20

## 2021-03-04 RX ADMIN — CHOLECALCIFEROL TAB 125 MCG (5000 UNIT) SCH UNIT: 125 TAB at 11:17

## 2021-03-04 RX ADMIN — APIXABAN SCH MG: 5 TABLET, FILM COATED ORAL at 22:19

## 2021-03-04 RX ADMIN — TEMAZEPAM PRN MG: 15 CAPSULE ORAL at 22:19

## 2021-03-04 NOTE — PROGRESS NOTE
Assessment and Plan





Assessment and Plan


Assessment and plan: 


--Severe hypoxia secondary to COVID-19 pneumonia


Current Visit: Yes   Status: Acute   


Plan to address problem: 


Patient weaned  to 10 L


--2019 novel coronavirus infection


Current Visit: Yes   Status: Acute   


Plan to address problem: 


Contact and droplet isolation


Management of COVID-19 per protocols


Completed empiric antibiotics and remdesivir


Completed dexamethasone 10 days last dose today. 


Prone position as tolerated


Home oxygen evaluation at discharge





-- Pneumonia


Current Visit: Yes   Status: Acute   


Plan to address problem: 


Completed empiric antibiotics, cultures negative to date





--Sepsis secondary to pneumonia


Current Visit: Yes   Status: Acute   


Plan to address problem: 


Completed antibiotics.  Supportive care





--DVT bilateral lower extremity


Current Visit: Yes   Status: Acute   


Plan to address problem: 


Transition from therapeutic Lovenox to Eliquis


Per protocol, elevate the limb





--Full code status


Current Visit: Yes   Status: Acute   


Plan to address problem: 


Patient is a full code.








Subjective


Date of service: 03/03/21


Principal diagnosis: Covid pneumonia


Interval history: 





Brief history:


52-year-old male patient no past medical history presented from [facility] with complaints of cough, shortness of breath.  There are no other 

symptoms.  He is found to be hypoxic on presentation on room air at 70%.  He was

admitted as a Covid PUI.  Corona PCR positive


Placed on isolation evaluated by ID received management per COVID-19 protocol, 

patient continues to be severely hypoxemic requiring HFNC oxygen


Today patient is using 15 L/70% FiO2/95% O2 sats














2/17: Patient continues on high flow oxygen.  Coronavirus testing came back 

positive.  [facility]  He is also noted to have acute bilateral DVT and with the severity of 

his illness I anticipate that he probably has pulmonary embolism.  We will 

continue full anticoagulation at this time.  Pulmonologist input is and ID input

appreciated.  We will add some vitamin supplementation and will evaluate if 

diuresis is needed.  Okay for patient to start diet, showing the patient was 

n.p.o. yesterday.  Plan has been discussed with the patient and also the nursing

staff and the officer on duty.





2/18: Remains on High flow oxygen 40L/100%. Prognosis poor. Prone as tolerated, 

will add some vitamins, give a dose of lasix. Updated the senior living physician Dr Sy. Will discuss with Pulmonary if we should proceed with upgrade to the 

[facility]. Give a dose of lasix





2/19: Late entry for February 19 continue supportive care.  Continue to 

encourage prone position.  Wean oxygen as tolerated.  Continue remdesivir





2/21: Patient seen and examined clinically stable.  Still on high flow down to 

90%.  Saturating 91%.  Continue to encourage prone position continue steroids 

and remdesivir wean as tolerated. Advised again of diagnosis. Continue to use IS





2/22: Continue supportive care, wean as tolerated, Prone as tolerated, continue 

steroids and anticoagulation per hospital policy





2/25: Patient remains on high flow nasal cannula oxygen


Mild improvement, wean as tolerated, home O2 evaluation





2/26; patient anxious to go home, however continues to require high flow oxygen,

home O2 evaluation titrate as tolerated





2/27; patient remains on high flow oxygen 30 L/FiO2 70/96% O2 sats, wean as 

tolerated


Patient is critically ill with guarded prognosis, ID and pulmonary 

recommendations noted





2/28; severely hypoxic ,remains on high flow 30 L /70 FiO2/ 98 O2 sats 


Lower extremity bilateral DVT, therapeutic Lovenox transition to Eliquis per 

protocol





3/1; patient continues to require high flow oxygen but slightly improved since 

yesterday


15 L/70%/O2 sats 95, continue to wean oxygen as tolerated and, home oxygen 

evaluation


For possible discharge 1 to 2 days if stable





3/2/21 


Weaned oxygen from 15 L to 3 L.





3/3/2021


On 10 L of oxygen























Objective





- Constitutional


Vitals: 


                               Vital Signs - 12hr











  03/04/21 03/04/21 03/04/21





  08:51 11:09 16:29


 


Temperature  98.6 F 97.5 F L


 


Pulse Rate  92 H 97 H


 


Respiratory  24 22





Rate   


 


Blood Pressure  111/66 127/75


 


O2 Sat by Pulse 95 97 92





Oximetry   











General appearance: Present: mild distress, well-nourished





- EENT


Eyes: PERRL, EOM intact


ENT: hearing intact, clear oral mucosa


Ears: bilateral: normal





- Neck


Neck: supple, normal ROM





- Respiratory


Respiratory effort: normal


Respiratory: bilateral: CTA





- Breasts


Breasts: normal





- Cardiovascular


Heart rate: 78


Rhythm: regular


Heart Sounds: Present: S1 & S2.  Absent: gallop, rub


Extremities: pulses intact, No edema, normal color, Full ROM





- Gastrointestinal


General gastrointestinal: Present: soft, non-tender, non-distended, normal bowel

sounds





- Genitourinary


Male genitourinary: normal





- Integumentary


Integumentary: clear, warm, dry





- Musculoskeletal


Musculoskeletal: 1, strength equal bilaterally





- Neurologic


Neurologic: moves all extremities





- Psychiatric


Psychiatric: memory intact, appropriate mood/affect, intact judgment & insight





- Labs


CBC & Chem 7: 


                                 03/03/21 05:05





                                 03/03/21 05:05





HEART Score





- HEART Score


Troponin: 


                                        











Troponin T  < 0.010 ng/mL (0.00-0.029)   02/15/21  23:28

## 2021-03-04 NOTE — PROGRESS NOTE
Assessment and Plan





Assessment and Plan


Assessment and plan: 


--Severe hypoxia secondary to COVID-19 pneumonia


Current Visit: Yes   Status: Acute   


Plan to address problem: 


Patient on 10 L nasal cannula oxygen





--2019 novel coronavirus infection


Current Visit: Yes   Status: Acute   


Plan to address problem: 


Contact and droplet isolation


Management of COVID-19 per protocols


Completed empiric antibiotics and remdesivir


Completed dexamethasone 10 days last dose today. 


Prone position as tolerated


Home oxygen evaluation at discharge





-- Pneumonia


Current Visit: Yes   Status: Acute   


Plan to address problem: 


Completed empiric antibiotics, cultures negative to date





--Sepsis secondary to pneumonia


Current Visit: Yes   Status: Acute   


Plan to address problem: 


Completed antibiotics.  Supportive care





--DVT bilateral lower extremity


Current Visit: Yes   Status: Acute   


Plan to address problem: 


Transition from therapeutic Lovenox to Eliquis


Per protocol, elevate the limb





--Full code status


Current Visit: Yes   Status: Acute   


Plan to address problem: 


Patient is a full code.








Subjective


Date of service: 03/04/21


Principal diagnosis: Dose COVID-19 positive test (U07.1, COVID-19) with Acute 

Pneumonia (J12.8


Interval history: 





Brief history:


52-year-old male patient no past medical history presented from [facility] with complaints of cough, shortness of breath.  There are no other sy

mptoms.  He is found to be hypoxic on presentation on room air at 70%.  He was 

admitted as a Covid PUI.  Corona PCR positive


Placed on isolation evaluated by ID received management per COVID-19 protocol, 

patient continues to be severely hypoxemic requiring HFNC oxygen


Today patient is using 15 L/70% FiO2/95% O2 sats














2/17: Patient continues on high flow oxygen.  Coronavirus testing came back po

sitive.  Patient is on steroids awaiting ID for remdesivir treatment initiation.

 He is also noted to have acute bilateral DVT and with the severity of his 

illness I anticipate that he probably has pulmonary embolism.  We will continue 

full anticoagulation at this time.  Pulmonologist input is and ID input 

appreciated.  We will add some vitamin supplementation and will evaluate if 

diuresis is needed.  Okay for patient to start diet, showing the patient was 

n.p.o. yesterday.  Plan has been discussed with the patient and also the nursing

staff and the officer on duty.





2/18: Remains on High flow oxygen 40L/100%. Prognosis poor. Prone as tolerated, 

will add some vitamins, give a dose of lasix. Updated the halfway physician Dr Sy. Will discuss with Pulmonary if we should proceed with upgrade to the 

[facility]. Give a dose of lasix





2/19: Late entry for February 19 continue supportive care.  Continue to 

encourage prone position.  Wean oxygen as tolerated.  Continue remdesivir





2/21: Patient seen and examined clinically stable.  Still on high flow down to 

90%.  Saturating 91%.  Continue to encourage prone position continue steroids 

and remdesivir wean as tolerated. Advised again of diagnosis. Continue to use IS





2/22: Continue supportive care, wean as tolerated, Prone as tolerated, continue 

steroids and anticoagulation per hospital policy





2/25: Patient remains on high flow nasal cannula oxygen


Mild improvement, wean as tolerated, home O2 evaluation





2/26; patient anxious to go home, however continues to require high flow oxygen,

home O2 evaluation titrate as tolerated





2/27; patient remains on high flow oxygen 30 L/FiO2 70/96% O2 sats, wean as 

tolerated


Patient is critically ill with guarded prognosis, ID and pulmonary 

recommendations noted





2/28; severely hypoxic ,remains on high flow 30 L /70 FiO2/ 98 O2 sats 


Lower extremity bilateral DVT, therapeutic Lovenox transition to Eliquis per 

protocol





3/1; patient continues to require high flow oxygen but slightly improved since 

yesterday


15 L/70%/O2 sats 95, continue to wean oxygen as tolerated and, home oxygen 

evaluation


For possible discharge 1 to 2 days if stable





3/2/21 


Weaned oxygen from 15 L to 10 L





3/3/2021


Patient on 10 L oxygen


























Objective





- Constitutional


Vitals: 


                               Vital Signs - 12hr











  03/04/21 03/04/21 03/04/21





  08:51 11:09 16:29


 


Temperature  98.6 F 97.5 F L


 


Pulse Rate  92 H 97 H


 


Respiratory  24 22





Rate   


 


Blood Pressure  111/66 127/75


 


O2 Sat by Pulse 95 97 92





Oximetry   











General appearance: Present: no acute distress, well-nourished





- EENT


Eyes: PERRL, EOM intact


ENT: hearing intact, clear oral mucosa


Ears: bilateral: normal





- Neck


Neck: supple, normal ROM





- Respiratory


Respiratory effort: normal


Respiratory: bilateral: CTA





- Breasts


Breasts: normal





- Cardiovascular


Heart rate: 78


Rhythm: regular


Heart Sounds: Present: S1 & S2.  Absent: gallop, rub


Extremities: pulses intact, No edema, normal color, Full ROM





- Gastrointestinal


General gastrointestinal: Present: soft, non-tender, non-distended, normal bowel

sounds





- Genitourinary


Male genitourinary: normal





- Integumentary


Integumentary: clear, warm, dry





- Musculoskeletal


Musculoskeletal: 1, strength equal bilaterally





- Neurologic


Neurologic: moves all extremities





- Psychiatric


Psychiatric: memory intact, appropriate mood/affect, intact judgment & insight





- Labs


CBC & Chem 7: 


                                 03/03/21 05:05





                                 03/03/21 05:05





HEART Score





- HEART Score


Troponin: 


                                        











Troponin T  < 0.010 ng/mL (0.00-0.029)   02/15/21  23:28

## 2021-03-04 NOTE — PROGRESS NOTE
Assessment and Plan





Agree with COVID precautions


Wean FiO2 for sats >88%


Agreed with steroids, 10 days total finished.  Has finished Remdesivir


Prone as tolerated during the day and sleep prone at night.


Guarded prognosis. Now on salter and stable maybe to cannula over the next few  

hours.  Patient has no funding so ideally need to try to wean him off oxygen 

unless he can afford to pay for it.





Subjective


Date of service: 03/04/21


Interval history: 





Now on salter.  Doing well.  No acute events documented.





Objective


                               Vital Signs - 12hr











  03/03/21 03/04/21 03/04/21





  22:11 01:30 04:28


 


Temperature 98.5 F  98.5 F


 


Pulse Rate 84  81


 


Respiratory 25 H  20





Rate   


 


Blood Pressure 97/66  101/56


 


O2 Sat by Pulse 93 95 98





Oximetry   











Constitutional: no acute distress, alert


Eyes: non-icteric


ENT: oropharynx moist


Neck: supple


Ascultation: Bilateral: diminished breath sounds


Cardiovascular: regular rate and rhythm


Gastrointestinal: normoactive bowel sounds, soft, non-tender


Integumentary: normal


Extremities: no cyanosis


Neurologic: normal mental status


CBC and BMP: 


                                 03/03/21 05:05





                                 03/03/21 05:05


ABG, PT/INR, D-dimer: 


PT/INR, D-dimer











PT  14.1 Sec. (12.2-14.9)   02/17/21  04:14    


 


INR  1.11  (0.87-1.13)   02/17/21  04:14    


 


D-Dimer  1125.93 ng/mlDDU (0-234)  H  02/23/21  04:58    








Abnormal lab findings: 


                                  Abnormal Labs











  02/15/21 02/15/21 02/15/21





  23:28 23:28 23:28


 


WBC   


 


MCHC   


 


Lymph % (Auto)  4.1 L  


 


Mono % (Auto)  7.8 H  


 


Lymph # (Auto)  0.3 L  


 


Seg Neutrophils %  86.9 H  


 


Seg Neutrophils #   


 


D-Dimer   6514.44 H 


 


Sodium    135 L


 


Chloride    95.2 L


 


Carbon Dioxide   


 


BUN   


 


Creatinine    0.7 L


 


Glucose   


 


Calcium   


 


Ferritin   


 


AST   


 


Lactate Dehydrogenase   


 


C-Reactive Protein   


 


Total Protein   


 


Albumin    3.0 L


 


Coronavirus (PCR)   














  02/15/21 02/15/21 02/16/21





  23:28 23:28 10:18


 


WBC   


 


MCHC   


 


Lymph % (Auto)   


 


Mono % (Auto)   


 


Lymph # (Auto)   


 


Seg Neutrophils %   


 


Seg Neutrophils #   


 


D-Dimer   


 


Sodium   


 


Chloride   


 


Carbon Dioxide   


 


BUN   


 


Creatinine   


 


Glucose   


 


Calcium   


 


Ferritin  737.7 H  


 


AST   


 


Lactate Dehydrogenase   636 H 


 


C-Reactive Protein   17.30 H 


 


Total Protein   


 


Albumin   


 


Coronavirus (PCR)    Positive A














  02/17/21 02/17/21 02/18/21





  04:14 04:14 05:19


 


WBC  11.9 H  


 


MCHC   


 


Lymph % (Auto)  5.4 L  


 


Mono % (Auto)   


 


Lymph # (Auto)  0.6 L  


 


Seg Neutrophils %  87.5 H  


 


Seg Neutrophils #  10.4 H  


 


D-Dimer   


 


Sodium    136 L


 


Chloride   


 


Carbon Dioxide   


 


BUN   27 H  25 H


 


Creatinine   0.7 L  0.6 L


 


Glucose   138 H 


 


Calcium   8.3 L  7.8 L


 


Ferritin   


 


AST   


 


Lactate Dehydrogenase   


 


C-Reactive Protein   


 


Total Protein    5.3 L


 


Albumin    2.9 L


 


Coronavirus (PCR)   














  02/19/21 02/19/21 02/19/21





  05:26 05:26 05:26


 


WBC   


 


MCHC   


 


Lymph % (Auto)   


 


Mono % (Auto)   


 


Lymph # (Auto)   


 


Seg Neutrophils %   


 


Seg Neutrophils #   


 


D-Dimer   5476.96 H 


 


Sodium   


 


Chloride   


 


Carbon Dioxide  31 H  


 


BUN  22 H  


 


Creatinine  0.5 L  


 


Glucose  117 H  


 


Calcium  7.9 L  


 


Ferritin    382.0 H


 


AST   


 


Lactate Dehydrogenase   


 


C-Reactive Protein   


 


Total Protein  5.8 L  


 


Albumin  2.7 L  


 


Coronavirus (PCR)   














  02/19/21 02/20/21 02/21/21





  05:26 05:50 05:41


 


WBC   


 


MCHC   


 


Lymph % (Auto)   


 


Mono % (Auto)   


 


Lymph # (Auto)   


 


Seg Neutrophils %   


 


Seg Neutrophils #   


 


D-Dimer    3581.74 H


 


Sodium   


 


Chloride   


 


Carbon Dioxide   


 


BUN   26 H 


 


Creatinine   0.5 L 


 


Glucose   


 


Calcium   7.7 L 


 


Ferritin   


 


AST   64 H 


 


Lactate Dehydrogenase  424 H  


 


C-Reactive Protein  3.10 H  


 


Total Protein   5.3 L 


 


Albumin   2.8 L 


 


Coronavirus (PCR)   














  02/21/21 02/21/21 02/23/21





  05:41 05:41 04:58


 


WBC   


 


MCHC   


 


Lymph % (Auto)   


 


Mono % (Auto)   


 


Lymph # (Auto)   


 


Seg Neutrophils %   


 


Seg Neutrophils #   


 


D-Dimer    1125.93 H


 


Sodium   


 


Chloride   


 


Carbon Dioxide   


 


BUN   


 


Creatinine   


 


Glucose   


 


Calcium   


 


Ferritin  357.0 H  


 


AST   


 


Lactate Dehydrogenase   478 H 


 


C-Reactive Protein   3.00 H 


 


Total Protein   


 


Albumin   


 


Coronavirus (PCR)   














  02/23/21 02/23/21 03/03/21





  04:58 04:58 05:05


 


WBC   


 


MCHC    35 H


 


Lymph % (Auto)    12.0 L


 


Mono % (Auto)    8.2 H


 


Lymph # (Auto)    1.0 L


 


Seg Neutrophils %    76.4 H


 


Seg Neutrophils #   


 


D-Dimer   


 


Sodium   


 


Chloride   


 


Carbon Dioxide   


 


BUN   


 


Creatinine   


 


Glucose   


 


Calcium   


 


Ferritin  333.2 H  


 


AST   


 


Lactate Dehydrogenase   382 H 


 


C-Reactive Protein   3.70 H 


 


Total Protein   


 


Albumin   


 


Coronavirus (PCR)   














  03/03/21





  05:05


 


WBC 


 


MCHC 


 


Lymph % (Auto) 


 


Mono % (Auto) 


 


Lymph # (Auto) 


 


Seg Neutrophils % 


 


Seg Neutrophils # 


 


D-Dimer 


 


Sodium 


 


Chloride 


 


Carbon Dioxide 


 


BUN  21 H


 


Creatinine  0.4 L


 


Glucose 


 


Calcium 


 


Ferritin 


 


AST 


 


Lactate Dehydrogenase 


 


C-Reactive Protein 


 


Total Protein  6.2 L


 


Albumin  2.7 L


 


Coronavirus (PCR)

## 2021-03-05 RX ADMIN — TEMAZEPAM PRN MG: 15 CAPSULE ORAL at 21:40

## 2021-03-05 RX ADMIN — CHOLECALCIFEROL TAB 125 MCG (5000 UNIT) SCH UNIT: 125 TAB at 09:37

## 2021-03-05 RX ADMIN — APIXABAN SCH MG: 5 TABLET, FILM COATED ORAL at 21:40

## 2021-03-05 RX ADMIN — Medication SCH ML: at 09:38

## 2021-03-05 RX ADMIN — MORPHINE SULFATE PRN MG: 2 INJECTION, SOLUTION INTRAMUSCULAR; INTRAVENOUS at 21:40

## 2021-03-05 RX ADMIN — OXYCODONE HYDROCHLORIDE AND ACETAMINOPHEN SCH MG: 500 TABLET ORAL at 21:39

## 2021-03-05 RX ADMIN — OXYCODONE HYDROCHLORIDE AND ACETAMINOPHEN SCH MG: 500 TABLET ORAL at 09:37

## 2021-03-05 RX ADMIN — APIXABAN SCH MG: 5 TABLET, FILM COATED ORAL at 09:37

## 2021-03-05 RX ADMIN — Medication SCH MG: at 09:38

## 2021-03-05 RX ADMIN — Medication SCH ML: at 21:51

## 2021-03-05 NOTE — PROGRESS NOTE
Assessment and Plan





3/5/21:  continue proning.  Wean FiO2 for sats > 88%.  Suggest walk test soon to

assess oxygen need.  Pulm status is stable to continuing to improve.  Will sign 

off.  Call if questions.





Agree with COVID precautions


Wean FiO2 for sats >88%


Agreed with steroids, 10 days total finished.  Has finished Remdesivir


Prone as tolerated during the day and sleep prone at night.


Guarded prognosis. Now on salter and stable maybe to cannula over the next few  

hours.  Patient has no funding so ideally need to try to wean him off oxygen 

unless he can afford to pay for it.








Subjective


Date of service: 03/05/21


Interval history: 





Down to 3 liters NC with good sats.





Objective


                               Vital Signs - 12hr











  03/04/21 03/04/21 03/05/21





  21:19 21:37 04:09


 


Temperature 98.7 F  98.6 F


 


Pulse Rate 90  87


 


Respiratory 16  16





Rate   


 


Blood Pressure 102/66  107/74


 


O2 Sat by Pulse 95 95 94





Oximetry   











Constitutional: no acute distress, alert


Eyes: non-icteric


ENT: oropharynx moist


Neck: supple


Ascultation: Bilateral: diminished breath sounds


Cardiovascular: regular rate and rhythm


Gastrointestinal: normoactive bowel sounds, soft, non-tender


Integumentary: normal


Extremities: no cyanosis


Neurologic: normal mental status


CBC and BMP: 


                                 03/03/21 05:05





                                 03/03/21 05:05


ABG, PT/INR, D-dimer: 


PT/INR, D-dimer











PT  14.1 Sec. (12.2-14.9)   02/17/21  04:14    


 


INR  1.11  (0.87-1.13)   02/17/21  04:14    


 


D-Dimer  1125.93 ng/mlDDU (0-234)  H  02/23/21  04:58    








Abnormal lab findings: 


                                  Abnormal Labs











  02/15/21 02/15/21 02/15/21





  23:28 23:28 23:28


 


WBC   


 


MCHC   


 


Lymph % (Auto)  4.1 L  


 


Mono % (Auto)  7.8 H  


 


Lymph # (Auto)  0.3 L  


 


Seg Neutrophils %  86.9 H  


 


Seg Neutrophils #   


 


D-Dimer   6514.44 H 


 


Sodium    135 L


 


Chloride    95.2 L


 


Carbon Dioxide   


 


BUN   


 


Creatinine    0.7 L


 


Glucose   


 


Calcium   


 


Ferritin   


 


AST   


 


Lactate Dehydrogenase   


 


C-Reactive Protein   


 


Total Protein   


 


Albumin    3.0 L


 


Coronavirus (PCR)   














  02/15/21 02/15/21 02/16/21





  23:28 23:28 10:18


 


WBC   


 


MCHC   


 


Lymph % (Auto)   


 


Mono % (Auto)   


 


Lymph # (Auto)   


 


Seg Neutrophils %   


 


Seg Neutrophils #   


 


D-Dimer   


 


Sodium   


 


Chloride   


 


Carbon Dioxide   


 


BUN   


 


Creatinine   


 


Glucose   


 


Calcium   


 


Ferritin  737.7 H  


 


AST   


 


Lactate Dehydrogenase   636 H 


 


C-Reactive Protein   17.30 H 


 


Total Protein   


 


Albumin   


 


Coronavirus (PCR)    Positive A














  02/17/21 02/17/21 02/18/21





  04:14 04:14 05:19


 


WBC  11.9 H  


 


MCHC   


 


Lymph % (Auto)  5.4 L  


 


Mono % (Auto)   


 


Lymph # (Auto)  0.6 L  


 


Seg Neutrophils %  87.5 H  


 


Seg Neutrophils #  10.4 H  


 


D-Dimer   


 


Sodium    136 L


 


Chloride   


 


Carbon Dioxide   


 


BUN   27 H  25 H


 


Creatinine   0.7 L  0.6 L


 


Glucose   138 H 


 


Calcium   8.3 L  7.8 L


 


Ferritin   


 


AST   


 


Lactate Dehydrogenase   


 


C-Reactive Protein   


 


Total Protein    5.3 L


 


Albumin    2.9 L


 


Coronavirus (PCR)   














  02/19/21 02/19/21 02/19/21





  05:26 05:26 05:26


 


WBC   


 


MCHC   


 


Lymph % (Auto)   


 


Mono % (Auto)   


 


Lymph # (Auto)   


 


Seg Neutrophils %   


 


Seg Neutrophils #   


 


D-Dimer   5476.96 H 


 


Sodium   


 


Chloride   


 


Carbon Dioxide  31 H  


 


BUN  22 H  


 


Creatinine  0.5 L  


 


Glucose  117 H  


 


Calcium  7.9 L  


 


Ferritin    382.0 H


 


AST   


 


Lactate Dehydrogenase   


 


C-Reactive Protein   


 


Total Protein  5.8 L  


 


Albumin  2.7 L  


 


Coronavirus (PCR)   














  02/19/21 02/20/21 02/21/21





  05:26 05:50 05:41


 


WBC   


 


MCHC   


 


Lymph % (Auto)   


 


Mono % (Auto)   


 


Lymph # (Auto)   


 


Seg Neutrophils %   


 


Seg Neutrophils #   


 


D-Dimer    3581.74 H


 


Sodium   


 


Chloride   


 


Carbon Dioxide   


 


BUN   26 H 


 


Creatinine   0.5 L 


 


Glucose   


 


Calcium   7.7 L 


 


Ferritin   


 


AST   64 H 


 


Lactate Dehydrogenase  424 H  


 


C-Reactive Protein  3.10 H  


 


Total Protein   5.3 L 


 


Albumin   2.8 L 


 


Coronavirus (PCR)   














  02/21/21 02/21/21 02/23/21





  05:41 05:41 04:58


 


WBC   


 


MCHC   


 


Lymph % (Auto)   


 


Mono % (Auto)   


 


Lymph # (Auto)   


 


Seg Neutrophils %   


 


Seg Neutrophils #   


 


D-Dimer    1125.93 H


 


Sodium   


 


Chloride   


 


Carbon Dioxide   


 


BUN   


 


Creatinine   


 


Glucose   


 


Calcium   


 


Ferritin  357.0 H  


 


AST   


 


Lactate Dehydrogenase   478 H 


 


C-Reactive Protein   3.00 H 


 


Total Protein   


 


Albumin   


 


Coronavirus (PCR)   














  02/23/21 02/23/21 03/03/21





  04:58 04:58 05:05


 


WBC   


 


MCHC    35 H


 


Lymph % (Auto)    12.0 L


 


Mono % (Auto)    8.2 H


 


Lymph # (Auto)    1.0 L


 


Seg Neutrophils %    76.4 H


 


Seg Neutrophils #   


 


D-Dimer   


 


Sodium   


 


Chloride   


 


Carbon Dioxide   


 


BUN   


 


Creatinine   


 


Glucose   


 


Calcium   


 


Ferritin  333.2 H  


 


AST   


 


Lactate Dehydrogenase   382 H 


 


C-Reactive Protein   3.70 H 


 


Total Protein   


 


Albumin   


 


Coronavirus (PCR)   














  03/03/21





  05:05


 


WBC 


 


MCHC 


 


Lymph % (Auto) 


 


Mono % (Auto) 


 


Lymph # (Auto) 


 


Seg Neutrophils % 


 


Seg Neutrophils # 


 


D-Dimer 


 


Sodium 


 


Chloride 


 


Carbon Dioxide 


 


BUN  21 H


 


Creatinine  0.4 L


 


Glucose 


 


Calcium 


 


Ferritin 


 


AST 


 


Lactate Dehydrogenase 


 


C-Reactive Protein 


 


Total Protein  6.2 L


 


Albumin  2.7 L


 


Coronavirus (PCR)

## 2021-03-05 NOTE — PROGRESS NOTE
Assessment and Plan





Assessment and Plan


--Severe hypoxia secondary to COVID-19 pneumonia


Current Visit: Yes   Status: Acute   


Plan to address problem: 


Patient on 6 L nasal cannula oxygen





--2019 novel coronavirus infection


Current Visit: Yes   Status: Acute   


Plan to address problem: 


Patient on 6 L nasal cannula oxygen


Finished 10 days of Decadron





-- Pneumonia


Current Visit: Yes   Status: Acute   


Plan to address problem: 


Completed empiric antibiotics, cultures negative to date





--Sepsis secondary to pneumonia


Current Visit: Yes   Status: Acute   


Plan to address problem: 


Completed antibiotics.  Supportive care





--DVT bilateral lower extremity


Current Visit: Yes   Status: Acute   


Plan to address problem: 


Transition from therapeutic Lovenox to Eliquis


Per protocol, elevate the limb





--Full code status


Current Visit: Yes   Status: Acute   


Plan to address problem: 


Patient is a full code.








Subjective


Date of service: 03/05/21


Principal diagnosis: Covid pneumonia


Interval history: 





Brief history:


52-year-old male patient no past medical history presented from [facility] with complaints of cough, shortness of breath.  There are no other 

symptoms.  He is found to be hypoxic on presentation on room air at 70%.  He was

admitted as a Covid PUI.  Corona PCR positive


Placed on isolation evaluated by ID received management per COVID-19 protocol, 

patient continues to be severely hypoxemic requiring HFNC oxygen


Today patient is using 15 L/70% FiO2/95% O2 sats














2/17: Patient continues on high flow oxygen.  Coronavirus testing came back 

positive.  Patient is on steroids awaiting ID for remdesivir treatment 

initiation.  He is also noted to have acute bilateral DVT and with the severity 

of his illness I anticipate that he probably has pulmonary embolism.  We will 

continue full anticoagulation at this time.  Pulmonologist input is and ID input

appreciated.  We will add some vitamin supplementation and will evaluate if 

diuresis is needed.  Okay for patient to start diet, showing the patient was 

n.p.o. yesterday.  Plan has been discussed with the patient and also the nursing

staff and the officer on duty.





2/18: Remains on High flow oxygen 40L/100%. Prognosis poor. Prone as tolerated, 

will add some vitamins, give a dose of lasix. Updated the retirement physician Dr Sy. Will discuss with Pulmonary if we should proceed with upgrade to the 

[facility]. Give a dose of lasix





2/19: Late entry for February 19 continue supportive care.  Continue to 

encourage prone position.  Wean oxygen as tolerated.  Continue remdesivir





2/21: Patient seen and examined clinically stable.  Still on high flow down to 

90%.  Saturating 91%.  Continue to encourage prone position continue steroids 

and remdesivir wean as tolerated. Advised again of diagnosis. Continue to use IS





2/22: Continue supportive care, wean as tolerated, Prone as tolerated, continue 

steroids and anticoagulation per hospital policy





2/25: Patient remains on high flow nasal cannula oxygen


Mild improvement, wean as tolerated, home O2 evaluation





2/26; patient anxious to go home, however continues to require high flow oxygen,

home O2 evaluation titrate as tolerated





2/27; patient remains on high flow oxygen 30 L/FiO2 70/96% O2 sats, wean as 

tolerated


Patient is critically ill with guarded prognosis, ID and pulmonary 

recommendations noted





2/28; severely hypoxic ,remains on high flow 30 L /70 FiO2/ 98 O2 sats 


Lower extremity bilateral DVT, therapeutic Lovenox transition to Eliquis per 

protocol





3/1; patient continues to require high flow oxygen but slightly improved since 

yesterday


15 L/70%/O2 sats 95, continue to wean oxygen as tolerated and, home oxygen 

evaluation


For possible discharge 1 to 2 days if stable





3/2/21 


Weaned oxygen from 15 L to 10 L





3/3/2021


Patient on 10 L oxygen





3/4/2021


Patient on 6 L nasal cannula oxygen





3/5/2021


Patient on 6 L nasal cannula oxygen


























Objective





- Constitutional


Vitals: 


                               Vital Signs - 12hr











  03/05/21 03/05/21





  04:09 08:00


 


Temperature 98.6 F 


 


Pulse Rate 87 


 


Respiratory 16 





Rate  


 


Blood Pressure 107/74 


 


O2 Sat by Pulse 94 94





Oximetry  











General appearance: Present: no acute distress, well-nourished





- EENT


Eyes: PERRL, EOM intact


ENT: hearing intact, clear oral mucosa


Ears: bilateral: normal





- Neck


Neck: supple, normal ROM





- Respiratory


Respiratory effort: normal


Respiratory: bilateral: CTA





- Breasts


Breasts: normal





- Cardiovascular


Heart rate: 78


Rhythm: regular


Heart Sounds: Present: S1 & S2.  Absent: gallop, rub


Extremities: pulses intact, No edema, normal color, Full ROM





- Gastrointestinal


General gastrointestinal: Present: soft, non-tender, non-distended, normal bowel

sounds





- Genitourinary


Male genitourinary: normal





- Integumentary


Integumentary: clear, warm, dry





- Musculoskeletal


Musculoskeletal: 1, strength equal bilaterally





- Neurologic


Neurologic: moves all extremities





- Psychiatric


Psychiatric: memory intact, appropriate mood/affect, intact judgment & insight





- Labs


CBC & Chem 7: 


                                 03/03/21 05:05





                                 03/03/21 05:05





HEART Score





- HEART Score


Troponin: 


                                        











Troponin T  < 0.010 ng/mL (0.00-0.029)   02/15/21  23:28

## 2021-03-06 RX ADMIN — OXYCODONE HYDROCHLORIDE AND ACETAMINOPHEN SCH MG: 500 TABLET ORAL at 09:16

## 2021-03-06 RX ADMIN — APIXABAN SCH MG: 5 TABLET, FILM COATED ORAL at 09:14

## 2021-03-06 RX ADMIN — Medication SCH MG: at 09:15

## 2021-03-06 RX ADMIN — APIXABAN SCH MG: 5 TABLET, FILM COATED ORAL at 21:14

## 2021-03-06 RX ADMIN — Medication SCH ML: at 09:16

## 2021-03-06 RX ADMIN — Medication SCH ML: at 21:15

## 2021-03-06 RX ADMIN — MORPHINE SULFATE PRN MG: 2 INJECTION, SOLUTION INTRAMUSCULAR; INTRAVENOUS at 21:22

## 2021-03-06 RX ADMIN — CHOLECALCIFEROL TAB 125 MCG (5000 UNIT) SCH UNIT: 125 TAB at 09:15

## 2021-03-06 RX ADMIN — OXYCODONE HYDROCHLORIDE AND ACETAMINOPHEN SCH MG: 500 TABLET ORAL at 21:14

## 2021-03-07 RX ADMIN — Medication SCH ML: at 10:10

## 2021-03-07 RX ADMIN — APIXABAN SCH MG: 5 TABLET, FILM COATED ORAL at 22:00

## 2021-03-07 RX ADMIN — CHOLECALCIFEROL TAB 125 MCG (5000 UNIT) SCH UNIT: 125 TAB at 10:10

## 2021-03-07 RX ADMIN — COLCHICINE SCH MG: 0.6 TABLET, FILM COATED ORAL at 10:10

## 2021-03-07 RX ADMIN — TEMAZEPAM PRN MG: 15 CAPSULE ORAL at 01:40

## 2021-03-07 RX ADMIN — OXYCODONE HYDROCHLORIDE AND ACETAMINOPHEN SCH MG: 500 TABLET ORAL at 10:10

## 2021-03-07 RX ADMIN — OXYCODONE HYDROCHLORIDE AND ACETAMINOPHEN SCH MG: 500 TABLET ORAL at 22:00

## 2021-03-07 RX ADMIN — Medication SCH MG: at 10:10

## 2021-03-07 RX ADMIN — Medication SCH ML: at 22:00

## 2021-03-07 RX ADMIN — COLCHICINE SCH MG: 0.6 TABLET, FILM COATED ORAL at 21:59

## 2021-03-07 RX ADMIN — APIXABAN SCH MG: 5 TABLET, FILM COATED ORAL at 10:10

## 2021-03-07 NOTE — PROGRESS NOTE
Assessment and Plan





Assessment and Plan


--Severe hypoxia secondary to COVID-19 pneumonia


Current Visit: Yes   Status: Acute   


Plan to address problem: 


Patient remains on high flow oxygen


30 L/FiO2 70 /98% O2 sats 


Wean as tolerated, maintain O2 sats to more than 88 to 90%


Continue supportive care, pulmonary following


Home oxygen to be arranged





--2019 novel coronavirus infection


Current Visit: Yes   Status: Acute   


Plan to address problem: 


Contact and droplet isolation


Management of COVID-19 per protocols


Completed empiric antibiotics and remdesivir


Completed dexamethasone 10 days last dose today. 


Prone position as tolerated


Home oxygen evaluation at discharge





-- Pneumonia


Current Visit: Yes   Status: Acute   


Plan to address problem: 


Completed empiric antibiotics, cultures negative to date





--Sepsis secondary to pneumonia


Current Visit: Yes   Status: Acute   


Plan to address problem: 


Completed antibiotics.  Supportive care





--DVT bilateral lower extremity


Current Visit: Yes   Status: Acute   


Plan to address problem: 


Transition from therapeutic Lovenox to Eliquis


Per protocol, elevate the limb





--Full code status


Current Visit: Yes   Status: Acute   


Plan to address problem: 


Patient is a full code.








Subjective


Date of service: 03/07/21


Principal diagnosis: Covid pneumonia


Interval history: 





Brief history:


52-year-old male patient no past medical history presented from [facility] with complaints of cough, shortness of breath.  There are no other 

symptoms.  He is found to be hypoxic on presentation on room air at 70%.  He was

admitted as a Covid PUI.  Corona PCR positive


Placed on isolation evaluated by ID received management per COVID-19 protocol, 

patient continues to be severely hypoxemic requiring HFNC oxygen


Today patient is using 15 L/70% FiO2/95% O2 sats














2/17: Patient continues on high flow oxygen.  Coronavirus testing came back 

positive.  Patient is on steroids awaiting ID for remdesivir treatment 

initiation.  He is also noted to have acute bilateral DVT and with the severity 

of his illness I anticipate that he probably has pulmonary embolism.  We will 

continue full anticoagulation at this time.  Pulmonologist input is and ID input

appreciated.  We will add some vitamin supplementation and will evaluate if 

diuresis is needed.  Okay for patient to start diet, showing the patient was 

n.p.o. yesterday.  Plan has been discussed with the patient and also the nursing

staff and the officer on duty.





2/18: Remains on High flow oxygen 40L/100%. Prognosis poor. Prone as tolerated, 

will add some vitamins, give a dose of lasix. Updated the retirement physician Dr Sy. Will discuss with Pulmonary if we should proceed with upgrade to the 

[facility]. Give a dose of lasix





2/19: Late entry for February 19 continue supportive care.  Continue to 

encourage prone position.  Wean oxygen as tolerated.  Continue remdesivir





2/21: Patient seen and examined clinically stable.  Still on high flow down to 

90%.  Saturating 91%.  Continue to encourage prone position continue steroids 

and remdesivir wean as tolerated. Advised again of diagnosis. Continue to use IS





2/22: Continue supportive care, wean as tolerated, Prone as tolerated, continue 

steroids and anticoagulation per hospital policy





2/25: Patient remains on high flow nasal cannula oxygen


Mild improvement, wean as tolerated, home O2 evaluation





2/26; patient anxious to go home, however continues to require high flow oxygen,

home O2 evaluation titrate as tolerated





2/27; patient remains on high flow oxygen 30 L/FiO2 70/96% O2 sats, wean as 

tolerated


Patient is critically ill with guarded prognosis, ID and pulmonary 

recommendations noted





2/28; severely hypoxic ,remains on high flow 30 L /70 FiO2/ 98 O2 sats 


Lower extremity bilateral DVT, therapeutic Lovenox transition to Eliquis per 

protocol





3/1; patient continues to require high flow oxygen but slightly improved since 

yesterday


15 L/70%/O2 sats 95, continue to wean oxygen as tolerated and, home oxygen 

evaluation


For possible discharge 1 to 2 days if stable





3/2/21 to 3/7/21


Weaned oxygen from 15 L to 3 L.























Objective





- Constitutional


Vitals: 


                               Vital Signs - 12hr











  03/07/21 03/07/21 03/07/21





  04:03 05:11 08:00


 


Temperature  98.0 F 


 


Pulse Rate  79 


 


Respiratory  16 





Rate   


 


Blood Pressure  100/55 


 


O2 Sat by Pulse 93 94 98





Oximetry   














  03/07/21 03/07/21 03/07/21





  10:00 10:33 10:35


 


Temperature   


 


Pulse Rate   


 


Respiratory   





Rate   


 


Blood Pressure   


 


O2 Sat by Pulse 91 85 97





Oximetry   











General appearance: Present: no acute distress, well-nourished





- EENT


Eyes: PERRL, EOM intact


ENT: hearing intact, clear oral mucosa


Ears: bilateral: normal





- Neck


Neck: supple, normal ROM





- Respiratory


Respiratory effort: normal


Respiratory: bilateral: CTA





- Breasts


Breasts: normal





- Cardiovascular


Heart rate: 78


Rhythm: regular


Heart Sounds: Present: S1 & S2.  Absent: gallop, rub


Extremities: pulses intact, No edema, normal color, Full ROM





- Gastrointestinal


General gastrointestinal: Present: soft, non-tender, non-distended, normal bowel

sounds





- Genitourinary


Male genitourinary: normal





- Integumentary


Integumentary: clear, warm, dry





- Musculoskeletal


Musculoskeletal: 1, strength equal bilaterally





- Neurologic


Neurologic: moves all extremities





- Psychiatric


Psychiatric: memory intact, appropriate mood/affect, intact judgment & insight





- Labs


CBC & Chem 7: 


                                 03/03/21 05:05





                                 03/03/21 05:05





HEART Score





- HEART Score


Troponin: 


                                        











Troponin T  < 0.010 ng/mL (0.00-0.029)   02/15/21  23:28

## 2021-03-07 NOTE — PROGRESS NOTE
Assessment and Plan





Assessment and Plan


--Severe hypoxia secondary to COVID-19 pneumonia


Current Visit: Yes   Status: Acute   


Plan to address problem: 


Patient remains on high flow oxygen


30 L/FiO2 70 /98% O2 sats 


Wean as tolerated, maintain O2 sats to more than 88 to 90%


Continue supportive care, pulmonary following


Home oxygen evaluation





--2019 novel coronavirus infection


Current Visit: Yes   Status: Acute   


Plan to address problem: 


Contact and droplet isolation


Management of COVID-19 per protocols


Completed empiric antibiotics and remdesivir


Completed dexamethasone 10 days last dose today. 


Prone position as tolerated


Home oxygen evaluation at discharge





-- Pneumonia


Current Visit: Yes   Status: Acute   


Plan to address problem: 


Completed empiric antibiotics, cultures negative to date





--Sepsis secondary to pneumonia


Current Visit: Yes   Status: Acute   


Plan to address problem: 


Completed antibiotics.  Supportive care





--DVT bilateral lower extremity


Current Visit: Yes   Status: Acute   


Plan to address problem: 


Transition from therapeutic Lovenox to Eliquis


Per protocol, elevate the limb





--Full code status


Current Visit: Yes   Status: Acute   


Plan to address problem: 


Patient is a full code.








Subjective


Date of service: 03/06/21


Principal diagnosis: Covid pneumonia


Interval history: 





Brief history:


52-year-old male patient no past medical history presented from [facility] with complaints of cough, shortness of breath.  There are no other 

symptoms.  He is found to be hypoxic on presentation on room air at 70%.  He was

admitted as a Covid PUI.  Corona PCR positive


Placed on isolation evaluated by ID received management per COVID-19 protocol, 

patient continues to be severely hypoxemic requiring HFNC oxygen


Today patient is using 15 L/70% FiO2/95% O2 sats














2/17: Patient continues on high flow oxygen.  Coronavirus testing came back 

positive.  Patient is on steroids awaiting ID for remdesivir treatment 

initiation.  He is also noted to have acute bilateral DVT and with the severity 

of his illness I anticipate that he probably has pulmonary embolism.  We will 

continue full anticoagulation at this time.  Pulmonologist input is and ID input

appreciated.  We will add some vitamin supplementation and will evaluate if 

diuresis is needed.  Okay for patient to start diet, showing the patient was 

n.p.o. yesterday.  Plan has been discussed with the patient and also the nursing

staff and the officer on duty.





2/18: Remains on High flow oxygen 40L/100%. Prognosis poor. Prone as tolerated, 

will add some vitamins, give a dose of lasix. Updated the [facility] physician Dr Sy. Will discuss with Pulmonary if we should proceed with upgrade to the 

[facility]. Give a dose of lasix





2/19: Late entry for February 19 continue supportive care.  Continue to 

encourage prone position.  Wean oxygen as tolerated.  Continue remdesivir





2/21: Patient seen and examined clinically stable.  Still on high flow down to 

90%.  Saturating 91%.  Continue to encourage prone position continue steroids 

and remdesivir wean as tolerated. Advised again of diagnosis. Continue to use IS





2/22: Continue supportive care, wean as tolerated, Prone as tolerated, continue 

steroids and anticoagulation per hospital policy





2/25: Patient remains on high flow nasal cannula oxygen


Mild improvement, wean as tolerated, home O2 evaluation





2/26; patient anxious to go home, however continues to require high flow oxygen,

home O2 evaluation titrate as tolerated





2/27; patient remains on high flow oxygen 30 L/FiO2 70/96% O2 sats, wean as 

tolerated


Patient is critically ill with guarded prognosis, ID and pulmonary 

recommendations noted





2/28; severely hypoxic ,remains on high flow 30 L /70 FiO2/ 98 O2 sats 


Lower extremity bilateral DVT, therapeutic Lovenox transition to Eliquis per 

protocol





3/1; patient continues to require high flow oxygen but slightly improved since 

yesterday


15 L/70%/O2 sats 95, continue to wean oxygen as tolerated and, home oxygen 

evaluation


For possible discharge 1 to 2 days if stable





3/2/21 to 3/6/21


Weaned oxygen from 15 L to 3 L.























Objective





- Constitutional


Vitals: 


                               Vital Signs - 12hr











  03/07/21 03/07/21 03/07/21





  04:03 05:11 08:00


 


Temperature  98.0 F 


 


Pulse Rate  79 


 


Respiratory  16 





Rate   


 


Blood Pressure  100/55 


 


O2 Sat by Pulse 93 94 98





Oximetry   














  03/07/21 03/07/21 03/07/21





  10:00 10:33 10:35


 


Temperature   


 


Pulse Rate   


 


Respiratory   





Rate   


 


Blood Pressure   


 


O2 Sat by Pulse 91 85 97





Oximetry   











General appearance: Present: mild distress, well-nourished





- EENT


Eyes: PERRL, EOM intact


ENT: hearing intact, clear oral mucosa


Ears: bilateral: normal





- Neck


Neck: supple, normal ROM





- Respiratory


Respiratory effort: normal


Respiratory: bilateral: CTA





- Breasts


Breasts: normal





- Cardiovascular


Heart rate: 78


Rhythm: regular


Heart Sounds: Present: S1 & S2.  Absent: gallop, rub


Extremities: pulses intact, No edema, normal color, Full ROM





- Gastrointestinal


General gastrointestinal: Present: soft, non-tender, non-distended, normal bowel

sounds





- Genitourinary


Male genitourinary: normal





- Integumentary


Integumentary: clear, warm, dry





- Musculoskeletal


Musculoskeletal: 1, strength equal bilaterally





- Neurologic


Neurologic: moves all extremities





- Psychiatric


Psychiatric: memory intact, appropriate mood/affect, intact judgment & insight





- Labs


CBC & Chem 7: 


                                 03/03/21 05:05





                                 03/03/21 05:05





HEART Score





- HEART Score


Troponin: 


                                        











Troponin T  < 0.010 ng/mL (0.00-0.029)   02/15/21  23:28

## 2021-03-08 RX ADMIN — OXYCODONE HYDROCHLORIDE AND ACETAMINOPHEN SCH MG: 500 TABLET ORAL at 21:15

## 2021-03-08 RX ADMIN — CHOLECALCIFEROL TAB 125 MCG (5000 UNIT) SCH UNIT: 125 TAB at 09:28

## 2021-03-08 RX ADMIN — TEMAZEPAM PRN MG: 15 CAPSULE ORAL at 01:22

## 2021-03-08 RX ADMIN — OXYCODONE HYDROCHLORIDE AND ACETAMINOPHEN SCH MG: 500 TABLET ORAL at 09:27

## 2021-03-08 RX ADMIN — COLCHICINE SCH MG: 0.6 TABLET, FILM COATED ORAL at 09:26

## 2021-03-08 RX ADMIN — Medication SCH ML: at 21:18

## 2021-03-08 RX ADMIN — APIXABAN SCH MG: 5 TABLET, FILM COATED ORAL at 21:15

## 2021-03-08 RX ADMIN — Medication SCH MG: at 09:26

## 2021-03-08 RX ADMIN — APIXABAN SCH MG: 5 TABLET, FILM COATED ORAL at 09:27

## 2021-03-08 RX ADMIN — Medication SCH ML: at 09:28

## 2021-03-08 RX ADMIN — COLCHICINE SCH MG: 0.6 TABLET, FILM COATED ORAL at 21:16

## 2021-03-08 NOTE — PROGRESS NOTE
Assessment and Plan





Assessment and Plan


--Severe hypoxia secondary to COVID-19 pneumonia


Current Visit: Yes   Status: Acute   


Plan to address problem: 


Patient remains on high flow oxygen


30 L/FiO2 70 /98% O2 sats 


Wean as tolerated, maintain O2 sats to more than 88 to 90%


Continue supportive care, pulmonary following


Home oxygen evaluation


Patient on 2 L nasal cannula oxygen


Home oxygen arranged


No place to go


 to find shelter/alternative resources





--2019 novel coronavirus infection


Current Visit: Yes   Status: Acute   


Plan to address problem: 


Contact and droplet isolation


Management of COVID-19 per protocols


Completed empiric antibiotics and remdesivir


Completed dexamethasone 10 days last dose today. 


Prone position as tolerated


Home oxygen arranged


-- Pneumonia


Current Visit: Yes   Status: Acute   


Plan to address problem: 


Completed empiric antibiotics, cultures negative to date





--Sepsis secondary to pneumonia


Current Visit: Yes   Status: Acute   


Plan to address problem: 


Completed antibiotics.  Supportive care





--DVT bilateral lower extremity


Current Visit: Yes   Status: Acute   


Plan to address problem: 


Transition from therapeutic Lovenox to Eliquis


Per protocol, elevate the limb





--Full code status


Current Visit: Yes   Status: Acute   


Plan to address problem: 


Patient is a full code.








Discharge planning


Patient is ready to be discharged discussed with  about shelter and 

placement





Subjective


Date of service: 03/08/21


Principal diagnosis: Covid pneumonia


Interval history: 





Brief history:


52-year-old male patient no past medical history presented from [facility] with complaints of cough, shortness of breath.  There are no other 

symptoms.  He is found to be hypoxic on presentation on room air at 70%.  He was

admitted as a Covid PUI.  Corona PCR positive


Placed on isolation evaluated by ID received management per COVID-19 protocol, 

patient continues to be severely hypoxemic requiring HFNC oxygen


Today patient is using 15 L/70% FiO2/95% O2 sats














2/17: Patient continues on high flow oxygen.  Coronavirus testing came back 

positive.  Patient is on steroids awaiting ID for remdesivir treatment 

initiation.  He is also noted to have acute bilateral DVT and with the severity 

of his illness I anticipate that he probably has pulmonary embolism.  We will 

continue full anticoagulation at this time.  Pulmonologist input is and ID input

appreciated.  We will add some vitamin supplementation and will evaluate if 

diuresis is needed.  Okay for patient to start diet, showing the patient was 

n.p.o. yesterday.  Plan has been discussed with the patient and also the nursing

staff and the officer on duty.





2/18: Remains on High flow oxygen 40L/100%. Prognosis poor. Prone as tolerated, 

will add some vitamins, give a dose of lasix. Updated the correction physician Dr Sy. Will discuss with Pulmonary if we should proceed with upgrade to the 

[facility]. Give a dose of lasix





2/19: Late entry for February 19 continue supportive care.  Continue to 

encourage prone position.  Wean oxygen as tolerated.  Continue remdesivir





2/21: Patient seen and examined clinically stable.  Still on high flow down to 

90%.  Saturating 91%.  Continue to encourage prone position continue steroids 

and remdesivir wean as tolerated. Advised again of diagnosis. Continue to use IS





2/22: Continue supportive care, wean as tolerated, Prone as tolerated, continue 

steroids and anticoagulation per hospital policy





2/25: Patient remains on high flow nasal cannula oxygen


Mild improvement, wean as tolerated, home O2 evaluation





2/26; patient anxious to go home, however continues to require high flow oxygen,

home O2 evaluation titrate as tolerated





2/27; patient remains on high flow oxygen 30 L/FiO2 70/96% O2 sats, wean as 

tolerated


Patient is critically ill with guarded prognosis, ID and pulmonary 

recommendations noted





2/28; severely hypoxic ,remains on high flow 30 L /70 FiO2/ 98 O2 sats 


Lower extremity bilateral DVT, therapeutic Lovenox transition to Eliquis per 

protocol





3/1; patient continues to require high flow oxygen but slightly improved since 

yesterday


15 L/70%/O2 sats 95, continue to wean oxygen as tolerated and, home oxygen 

evaluation


For possible discharge 1 to 2 days if stable





3/2/21 to 3/7/21


Weaned oxygen from 15 L to 3 L.





3/8 /21


Patient on 2 L nasal cannula oxygen


Ready for discharge with home oxygen


 to look into shelter and placement2




















Objective





- Constitutional


Vitals: 


                               Vital Signs - 12hr











  03/08/21





  07:36


 


O2 Sat by Pulse 95





Oximetry 











General appearance: Present: no acute distress, well-nourished





- EENT


Eyes: PERRL, EOM intact


ENT: hearing intact, clear oral mucosa


Ears: bilateral: normal





- Neck


Neck: supple, normal ROM





- Respiratory


Respiratory effort: normal


Respiratory: bilateral: CTA





- Breasts


Breasts: normal





- Cardiovascular


Heart rate: 78


Rhythm: regular


Heart Sounds: Present: S1 & S2.  Absent: gallop, rub


Extremities: pulses intact, No edema, normal color, Full ROM





- Gastrointestinal


General gastrointestinal: Present: soft, non-tender, non-distended, normal bowel

sounds





- Genitourinary


Male genitourinary: normal





- Integumentary


Integumentary: clear, warm, dry





- Musculoskeletal


Musculoskeletal: 1, strength equal bilaterally





- Neurologic


Neurologic: moves all extremities





- Psychiatric


Psychiatric: memory intact, appropriate mood/affect, intact judgment & insight





- Labs


CBC & Chem 7: 


                                 03/03/21 05:05





                                 03/03/21 05:05





HEART Score





- HEART Score


Troponin: 


                                        











Troponin T  < 0.010 ng/mL (0.00-0.029)   02/15/21  23:28

## 2021-03-09 VITALS — SYSTOLIC BLOOD PRESSURE: 108 MMHG | DIASTOLIC BLOOD PRESSURE: 71 MMHG

## 2021-03-09 RX ADMIN — OXYCODONE HYDROCHLORIDE AND ACETAMINOPHEN SCH MG: 500 TABLET ORAL at 09:34

## 2021-03-09 RX ADMIN — Medication SCH MG: at 09:34

## 2021-03-09 RX ADMIN — Medication SCH ML: at 09:35

## 2021-03-09 RX ADMIN — APIXABAN SCH MG: 5 TABLET, FILM COATED ORAL at 09:35

## 2021-03-09 RX ADMIN — CHOLECALCIFEROL TAB 125 MCG (5000 UNIT) SCH UNIT: 125 TAB at 09:35

## 2021-03-09 RX ADMIN — COLCHICINE SCH MG: 0.6 TABLET, FILM COATED ORAL at 09:34

## 2021-03-09 NOTE — DISCHARGE SUMMARY
Providers





- Providers


Date of Admission: 


02/16/21 09:31





Date of discharge: 03/09/21


Attending physician: 


CA FERREIRA MD





                                        





02/16/21 00:57


Consult to Physician [CONS] Routine 


   Comment: 


   Consulting Provider: MARIJA SAUL


   Physician Instructions: 


   Reason For Exam: Pneumonia R/O COVID-19





02/24/21 10:13


Physical Therapy Evaluation and Treat [CONS] Stat 


   Comment: 


   Reason For Exam: pending discharge











Primary care physician: 


PRIMARY CARE MD








Hospitalization


Reason for admission: Acute hypoxic respiratory failure, COVID-19 pneumonia, 

sepsis, DVT


Condition: Stable


Pertinent studies: 





Doppler ultrasound; bilateral LE DVT


Hospital course: 





History of present illness: 


52 year old white male with no significant past medical problems currently 

incarcerated at [facility] seen in the emergency room with complaints 

of Cough, shortness of breath. He denies any fever, no chills, no chest pain, no

 nausea ,no vomiting, no diarrhea.


Upon arrival in the emergency room , he was hypoxic with oxygen saturation of 

70%.


Work up in the ER reveals bilateral infiltrate, elevated D-dimer.


He has been started on empiric antibiotics and IV steroid.


He is also to be ruled out for COVID-19.





Hospital Course


============


--Severe hypoxia secondary to COVID-19 pneumonia


Current Visit: Yes   Status: Acute   


Plan to address problem: 


Patient remains on high flow oxygen


30 L/FiO2 70 /98% O2 sats 


Wean as tolerated, maintain O2 sats to more than 88 to 90%


Continue supportive care, pulmonary following


Home oxygen evaluation


Patient on 2 L nasal cannula oxygen


Home oxygen arranged


No place to go


 to find shelter/alternative resources





--2019 novel coronavirus infection


Current Visit: Yes   Status: Acute   


Plan to address problem: 


Contact and droplet isolation


Management of COVID-19 per protocols


Completed empiric antibiotics and remdesivir


Completed dexamethasone 10 days last dose today. 


Prone position as tolerated


Home oxygen arranged


-- Pneumonia


Current Visit: Yes   Status: Acute   


Plan to address problem: 


Completed empiric antibiotics, cultures negative to date





--Sepsis secondary to pneumonia


Current Visit: Yes   Status: Acute   


Plan to address problem: 


Completed antibiotics.  Supportive care





--DVT bilateral lower extremity


Current Visit: Yes   Status: Acute   


Plan to address problem: 


Transition from therapeutic Lovenox to Eliquis


Per protocol, elevate the limb





--Full code status


Current Visit: Yes   Status: Acute   


Plan to address problem: 


Patient is a full code.





52-year-old male patient no past medical history presented from [facility] with complaints of cough, shortness of breath.  There are no other 

symptoms.  He is found to be hypoxic on presentation on room air at 70%.  He was

 admitted as a Covid PUI.  Corona PCR positive


Placed on isolation evaluated by ID received management per COVID-19 protocol, 

patient continues to be severely hypoxemic requiring HFNC oxygen


Today patient is using 15 L/70% FiO2/95% O2 sats














2/17: Patient continues on high flow oxygen.  Coronavirus testing came back 

positive.  Patient is on steroids awaiting ID for remdesivir treatment 

initiation.  He is also noted to have acute bilateral DVT and with the severity 

of his illness I anticipate that he probably has pulmonary embolism.  We will 

continue full anticoagulation at this time.  Pulmonologist input is and ID input

 appreciated.  We will add some vitamin supplementation and will evaluate if 

diuresis is needed.  Okay for patient to start diet, showing the patient was 

n.p.o. yesterday.  Plan has been discussed with the patient and also the nursing

 staff and the officer on duty.





2/18: Remains on High flow oxygen 40L/100%. Prognosis poor. Prone as tolerated, 

will add some vitamins, give a dose of lasix. Updated the care home physician Dr Sy. Will discuss with Pulmonary if we should proceed with upgrade to the 

[facility]. Give a dose of lasix





2/19: Late entry for February 19 continue supportive care.  Continue to 

encourage prone position.  Wean oxygen as tolerated.  Continue remdesivir





2/21: Patient seen and examined clinically stable.  Still on high flow down to 

90%.  Saturating 91%.  Continue to encourage prone position continue steroids 

and remdesivir wean as tolerated. Advised again of diagnosis. Continue to use IS





2/22: Continue supportive care, wean as tolerated, Prone as tolerated, continue 

steroids and anticoagulation per hospital policy





2/25: Patient remains on high flow nasal cannula oxygen


Mild improvement, wean as tolerated, home O2 evaluation





2/26; patient anxious to go home, however continues to require high flow oxygen,

 home O2 evaluation titrate as tolerated





2/27; patient remains on high flow oxygen 30 L/FiO2 70/96% O2 sats, wean as 

tolerated


Patient is critically ill with guarded prognosis, ID and pulmonary 

recommendations noted





2/28; severely hypoxic ,remains on high flow 30 L /70 FiO2/ 98 O2 sats 


Lower extremity bilateral DVT, therapeutic Lovenox transition to Eliquis per 

protocol





3/1; patient continues to require high flow oxygen but slightly improved since 

yesterday


15 L/70%/O2 sats 95, continue to wean oxygen as tolerated and, home oxygen 

evaluation


For possible discharge 1 to 2 days if stable





3/2/21 to 3/7/21


Weaned oxygen from 15 L to 3 L.





3/8 /21


Patient on 2 L nasal cannula oxygen


Ready for discharge with home oxygen


 to look into shelter and placement





Patient was seen and evaluated this morning, 2 L of oxygen arranged.  Patient 

discharged to her son at home care.  Patient was given a prescription for 

Eliquis for DVT, prednisone tapering dose, as needed Xanax.  Patient advised to 

follow-up with primary care physician for weaning of oxygen.  Patient was 

hemodynamically stable at the time of discharge.  Management plan was discussed 

in detail with the patient.


Disposition: [id number] HOME UNDER HOME [facility]


Time spent for discharge: 35 minutes





- Discharge Diagnoses


(1) Acute respiratory failure with hypoxia


Status: Acute   





(2) COVID-19


Status: Acute   





(3) Pneumonia


Status: Acute   





(4) Sepsis


Status: Acute   





Core Measure Documentation





- Palliative Care


Palliative Care/ Comfort Measures: Not Applicable





- Core Measures


Any of the following diagnoses?: none





Exam





- Physical Exam


Narrative exam: 





 Not in cardiopulmonary distress. 


 The patient appeared well nourished and normally developed.


 Vital signs as documented.


 Head exam is unremarkable.


 No scleral icterus .


 Neck is without jugular venous distension, thyromegaly, or carotid bruits. 


 Lungs are clear to auscultation.


Cardiac exam reveals regular rate and  Rhythm. 


Abdominal exam reveals normal bowel sounds, nontender, no organomegaly.


Extremities are nonedematous and both femoral and pedal pulses are normal.


CNS: Alert and oriented 3.  No focal weakness.





- Constitutional


Vitals: 


                                        











Temp Pulse Resp BP Pulse Ox


 


 98.4 F   80   20   104/60   94 


 


 03/09/21 04:21  03/09/21 04:21  03/09/21 04:21  03/09/21 04:21  03/09/21 04:21














Plan


Activity: advance as tolerated


Weight Bearing Status: Full Weight Bearing


Diet: regular


Additional Instructions: Follow-up as outside clinic in 3 to 5 days


Follow up with: 


PRIMARY CARE,MD [Primary Care Provider] - 3-5 Days


Prescriptions: 


Apixaban [Eliquis] 5 mg PO Q12HR #60 tablet


prednisoLONE [Millipred 5mg (6 day 21 tab dosepak)] 5 mg PO QDAY #1 tab.ds.pk


Cholecalciferol (Vitamin D3) [Vitamin D3] 5,000 unit PO DAILY #20 tablet


ALPRAZolam [Xanax TAB] 0.25 mg PO Q8H PRN #15 tablet


 PRN Reason: Anxiety